# Patient Record
Sex: MALE | Race: WHITE | NOT HISPANIC OR LATINO | Employment: FULL TIME | URBAN - METROPOLITAN AREA
[De-identification: names, ages, dates, MRNs, and addresses within clinical notes are randomized per-mention and may not be internally consistent; named-entity substitution may affect disease eponyms.]

---

## 2017-03-30 ENCOUNTER — HOSPITAL ENCOUNTER (EMERGENCY)
Facility: CLINIC | Age: 32
Discharge: HOME | End: 2017-03-30
Attending: EMERGENCY MEDICINE | Admitting: EMERGENCY MEDICINE

## 2017-03-30 VITALS
TEMPERATURE: 97.8 F | RESPIRATION RATE: 16 BRPM | DIASTOLIC BLOOD PRESSURE: 82 MMHG | OXYGEN SATURATION: 100 % | SYSTOLIC BLOOD PRESSURE: 142 MMHG

## 2017-03-30 DIAGNOSIS — K02.9 INFECTED DENTAL CARIES: ICD-10-CM

## 2017-03-30 DIAGNOSIS — K05.10 GINGIVITIS: ICD-10-CM

## 2017-03-30 DIAGNOSIS — K04.7 INFECTED DENTAL CARIES: ICD-10-CM

## 2017-03-30 PROCEDURE — 99213 OFFICE O/P EST LOW 20 MIN: CPT | Performed by: EMERGENCY MEDICINE

## 2017-03-30 PROCEDURE — 99213 OFFICE O/P EST LOW 20 MIN: CPT

## 2017-03-30 RX ORDER — CLINDAMYCIN HCL 300 MG
300 CAPSULE ORAL 4 TIMES DAILY
Qty: 40 CAPSULE | Refills: 0 | Status: SHIPPED | OUTPATIENT
Start: 2017-03-30 | End: 2017-04-09

## 2017-03-30 ASSESSMENT — ENCOUNTER SYMPTOMS
CHILLS: 0
NAUSEA: 0
TROUBLE SWALLOWING: 0
SHORTNESS OF BREATH: 0
APPETITE CHANGE: 1
CHEST TIGHTNESS: 0
SORE THROAT: 0
ABDOMINAL PAIN: 0
VOMITING: 0
FEVER: 0
HEADACHES: 0

## 2017-03-30 NOTE — ED AVS SNAPSHOT
[facility] Emergency Department    [address] [address]    Phone:  [phone]    Fax:  [phone]                                       Mario Posada   MRN: [id number]    Department:  [facility] Emergency Department   Date of Visit:  3/30/2017           After Visit Summary Signature Page     I have received my discharge instructions, and my questions have been answered. I have discussed any challenges I see with this plan with the nurse or doctor.    ..........................................................................................................................................  Patient/Patient Representative Signature      ..........................................................................................................................................  Patient Representative Print Name and Relationship to Patient    ..................................................               ................................................  Date                                            Time    ..........................................................................................................................................  Reviewed by Signature/Title    ...................................................              ..............................................  Date                                                            Time

## 2017-03-30 NOTE — ED NOTES
Pt having swelling on the side of his mouth for the past 10 days.  Pt states he has been having green stool with mucous in it.  States he has had this before when he has had a dental infection.  Denies pain.

## 2017-03-30 NOTE — DISCHARGE INSTRUCTIONS
Stages of Periodontal Disease  Periodontal disease is an infection of the gums and tissues supporting the teeth. If not treated, it often gets worse. Bone damage and tooth loss can occur. Regular self-care and dental visits can help prevent or control periodontal disease.    Gingivitis  This is the mildest form of periodontal disease. The gum becomes irritated and swollen (inflamed). The space between the gum and tooth gets deeper, forming a pocket. Gums may become red and may bleed. Or, there may be no symptoms. Left untreated, it can progress to periodontitis.       Periodontitis  Infection and inflammation spread to the bone supporting the teeth. Gums may recede (shrink back) from the teeth. Pockets between the teeth can get deeper and harder to clean. Redness, swelling, and bleeding may develop or get worse. Infection begins to destroy the bone. As bone is destroyed, teeth may start to feel loose.       Advanced periodontitis  As periodontitis advances, pockets deepen even more and can fill with pus. Around the roots of the teeth, the gums may start to swell. Bone loss continues. The teeth may feel sensitive to heat or cold and may hurt when brushed. Teeth loosen more. In some cases, teeth may need to be removed to keep the disease from spreading.    1868-8463 [employer]. [address] [place] [address], [place] [address]. All rights reserved. This information is not intended as a substitute for professional medical care. Always follow your healthcare professional's instructions.          Understanding Gingivitis  Gingivitis is a type of gum disease. It is an inflammation of the gums that causes redness and swelling. It s most often caused by infection from bacteria on the teeth. A severe infection can cause small painful sores on the gums, bad breath, and bleeding gums. If untreated, gingivitis can lead to periodontal disease. Over time, this can cause tooth loss.  What causes gingivitis?  Gingivitis is  most often caused when plaque builds up on your teeth. Plaque is a sticky film made of bacteria and other substances that coats your teeth. Brushing and flossing helps remove plaque. If you don t brush and floss regularly, plaque can build up and lead to gingivitis.  You are more likely to have gingivitis if you:    Don t brush or floss regularly    Smoke or chew tobacco    Are pregnant    Have diabetes    Use medicines such as birth control pills, steroids, drugs used to treat epilepsy, or cancer drugs    Have family members who tend to get gingivitis  Signs and symptoms  You may have gingivitis if your gums have areas that:    Are swollen    Are bright red or dusky red    Bleed easily    Are sore  Treating gingivitis    See your dentist. He or she may clean your teeth and remove buildup on your teeth of plaque and tartar. Tartar is a mixture of plaque and minerals that forms into a hard substance.    Use an antiseptic mouth rinse if your dentist tells you to.    Take antibiotics and other medicines exactly as directed. Don t stop taking them when your symptoms go away.    Make sure you brush at least twice a day, and use dental floss at least once a day. This will help remove plaque from your teeth.    Eat a soft diet, if needed, to ease discomfort. Avoid food and beverages that may cause more discomfort in your gums. These include citrus juices such as orange juice and lemonade, and salty or spicy foods.    Use acetaminophen or ibuprofen for pain or fever. If you have liver or kidney disease or ever had a stomach ulcer or gastrointestinaI bleeding, talk with your healthcare provider before using these medicines.  When to call the healthcare provider  Call your healthcare provider if you have:    Pain that doesn t go away or gets worse    Gums that have pulled away from your teeth    A bad taste in your mouth that won t go away    Teeth that become loose    Inability to eat or drink because of mouth pain         6574-1671 [employer]. [address] [place] [address], [place] [address]. All rights reserved. This information is not intended as a substitute for professional medical care. Always follow your healthcare professional's instructions.      Many of these clinics offer a sliding fee option for patients that qualify, and see patients on a walk-in or same day basis. Please call each clinic directly. As services, hours, fees and policies vary greatly.          [facility], [facility]  [phone]  Sees no insurance  [facility], [place]  [phone]  Preventive services only  [facility] (mult loc) [phone]  [facility]    ([facility]), [facility]  [phone]  [facility]       [phone]  Preventive services only  [facility]  [phone]  Accepts MA & sees no ins  [facility],      Accepts MA & sees no ins   [place]   [phone]; [phone]  [facility]   Accepts MA & sees no ins       [phone]  [facility], [facility]  [phone]   Accepts MA emergencies  [facility], [place] [phone]  [facility],     Accepts MA   [place]   [phone]    [facility] [phone]  Accepts MA & sees no ins   [facility]   Dental Clinic    [phone]  [facility]  [phone]   [facility] [phone]  [facility]  Preventive services only   [place]   [phone]  [facility] [phone]  [facility], [place]  [phone]  Same day [facility] [phone]  Same day [facility],      Same day [facility]   [phone]    [facility], [facility] [phone]  Free clinic, walk-in only  [facility] (Located within [facility]  locations) [phone]      [facility] , [facility] [phone]    [facility] [phone]  Free clinic, walk-in only  [facility]  [phone]  [facility] [phone] (adults)       [phone] (children)  [facility] [phone]    Also, referral service for low cost dental and healthcare: [phone]  And [phone]

## 2017-03-30 NOTE — ED AVS SNAPSHOT
[facility] Emergency Department    [address] [address]    Phone:  [phone]    Fax:  [phone]                                       Mario Posada   MRN: [id number]    Department:  [facility] Emergency Department   Date of Visit:  3/30/2017           Patient Information     Date Of Birth          1985        Your diagnoses for this visit were:     Infected dental caries     Gingivitis        You were seen by Nigel Baer MD.      Follow-up Information     Follow up with Primary Jaxon Howard MD.    Why:  As needed        Follow up with [facility] Emergency Department.    Specialty:  EMERGENCY MEDICINE    Why:  If symptoms worsen    Contact information:    [address] [place] [id number] [phone]    Additional information:    The medical center is located at   [address]. (between [address] and   [address] in [place], [place] miles north   of [place]).        Discharge Instructions         Stages of Periodontal Disease  Periodontal disease is an infection of the gums and tissues supporting the teeth. If not treated, it often gets worse. Bone damage and tooth loss can occur. Regular self-care and dental visits can help prevent or control periodontal disease.    Gingivitis  This is the mildest form of periodontal disease. The gum becomes irritated and swollen (inflamed). The space between the gum and tooth gets deeper, forming a pocket. Gums may become red and may bleed. Or, there may be no symptoms. Left untreated, it can progress to periodontitis.       Periodontitis  Infection and inflammation spread to the bone supporting the teeth. Gums may recede (shrink back) from the teeth. Pockets between the teeth can get deeper and harder to clean. Redness, swelling, and bleeding may develop or get worse. Infection begins to destroy the bone. As bone is destroyed, teeth may start to feel loose.       Advanced periodontitis  As periodontitis  advances, pockets deepen even more and can fill with pus. Around the roots of the teeth, the gums may start to swell. Bone loss continues. The teeth may feel sensitive to heat or cold and may hurt when brushed. Teeth loosen more. In some cases, teeth may need to be removed to keep the disease from spreading.    9346-9980 The "[employer] [address] [place] [address]. All rights reserved. This information is not intended as a substitute for professional medical care. Always follow your healthcare professional's instructions.          Understanding Gingivitis  Gingivitis is a type of gum disease. It is an inflammation of the gums that causes redness and swelling. It s most often caused by infection from bacteria on the teeth. A severe infection can cause small painful sores on the gums, bad breath, and bleeding gums. If untreated, gingivitis can lead to periodontal disease. Over time, this can cause tooth loss.  What causes gingivitis?  Gingivitis is most often caused when plaque builds up on your teeth. Plaque is a sticky film made of bacteria and other substances that coats your teeth. Brushing and flossing helps remove plaque. If you don t brush and floss regularly, plaque can build up and lead to gingivitis.  You are more likely to have gingivitis if you:    Don t brush or floss regularly    Smoke or chew tobacco    Are pregnant    Have diabetes    Use medicines such as birth control pills, steroids, drugs used to treat epilepsy, or cancer drugs    Have family members who tend to get gingivitis  Signs and symptoms  You may have gingivitis if your gums have areas that:    Are swollen    Are bright red or dusky red    Bleed easily    Are sore  Treating gingivitis    See your dentist. He or she may clean your teeth and remove buildup on your teeth of plaque and tartar. Tartar is a mixture of plaque and minerals that forms into a hard substance.    Use an antiseptic mouth rinse if your dentist  tells you to.    Take antibiotics and other medicines exactly as directed. Don t stop taking them when your symptoms go away.    Make sure you brush at least twice a day, and use dental floss at least once a day. This will help remove plaque from your teeth.    Eat a soft diet, if needed, to ease discomfort. Avoid food and beverages that may cause more discomfort in your gums. These include citrus juices such as orange juice and lemonade, and salty or spicy foods.    Use acetaminophen or ibuprofen for pain or fever. If you have liver or kidney disease or ever had a stomach ulcer or gastrointestinaI bleeding, talk with your healthcare provider before using these medicines.  When to call the healthcare provider  Call your healthcare provider if you have:    Pain that doesn t go away or gets worse    Gums that have pulled away from your teeth    A bad taste in your mouth that won t go away    Teeth that become loose    Inability to eat or drink because of mouth pain        7358-8191 [employer]. [address] [place] [address]. All rights reserved. This information is not intended as a substitute for professional medical care. Always follow your healthcare professional's instructions.      Many of these clinics offer a sliding fee option for patients that qualify, and see patients on a walk-in or same day basis. Please call each clinic directly. As services, hours, fees and policies vary greatly.          [facility], [facility]  [phone]  Sees no insurance  [facility], [place]  [phone]  Preventive services only  [facility] ([facility] loc) [phone]  [facility]    ([facility]), [facility]  [phone]  [facility], [place]       [phone]  Preventive services only  [facility]  [phone]  Accepts MA & sees no ins  [facility],      Accepts MA & sees no ins   [place]   [phone];  [phone]  [facility], [facility]   Accepts MA & sees no ins       [phone]  [facility], [facility]  [phone]   Accepts MA emergencies  [facility], [place] [phone]  [facility],     Accepts MA   [place]   [phone]    [facility] [phone]  Accepts MA & sees no ins   [facility]   Dental Clinic    [phone]  [facility], [facility]  [phone]   Community Clinic [phone]  [facility]  Preventive services only   [place]   [phone]  [facility] [phone]  [facility]  [phone]  Same day [facility] [phone]  Same day [facility],      Same day [facility]   [phone]    [facility], [facility] [phone]  Free clinic, walk-in only  [facility] (multiple locations) [phone]      [facility] [phone]    [facility] [phone]  Free clinic, walk-in only  [facility]  [phone]  [facility] [phone] (adults)       [phone] (children)  [place] [facility] [phone]    Also, referral service for low cost dental and healthcare: [phone]  And [phone]      24 Hour Appointment Hotline       To make an appointment at any [facility] clinic, call [phone] ([phone]). If you don't have a family doctor or clinic, we will help you find one. [facility] clinics are conveniently located to serve the needs of you and your family.             Review of your medicines      START taking        Dose / Directions Last dose taken    clindamycin 300 MG capsule   Commonly known as:  CLEOCIN   Dose:  300 mg   Quantity:  40 capsule        Take 1 capsule (300 mg) by mouth 4 times daily for 10 days   Refills:  0  "               Prescriptions were sent or printed at these locations (1 Prescription)                   Other Prescriptions                Printed at Department/Unit printer (1 of 1)         clindamycin (CLEOCIN) 300 MG capsule                Orders Needing Specimen Collection     None      Pending Results     No orders found from 3/28/2017 to 3/31/2017.            Pending Culture Results     No orders found from 3/28/2017 to 3/31/2017.             Test Results from your hospital stay            Thank you for choosing [facility]       Thank you for choosing [facility] for your care. Our goal is always to provide you with excellent care. Hearing back from our patients is one way we can continue to improve our services. Please take a few minutes to complete the written survey that you may receive in the mail after you visit with us. Thank you!        [employer] Information     [employer] lets you send messages to your doctor, view your test results, renew your prescriptions, schedule appointments and more. To sign up, go to [email or web address] . Click on \"Log in\" on the left side of the screen, which will take you to the Welcome page. Then click on \"Sign up Now\" on the right side of the page.     You will be asked to enter the access code listed below, as well as some personal information. Please follow the directions to create your username and password.     Your access code is: [email or web address]  Expires: 2017  4:04 PM     Your access code will  in 90 days. If you need help or a new code, please call your [facility] clinic or [phone].        Care EveryWhere ID     This is your Care EveryWhere ID. This could be used by other organizations to access your [facility] medical records  [id number]        After Visit Summary       This is your record. Keep this with you and show to your community pharmacist(s) and doctor(s) at your next visit.                  "

## 2017-03-30 NOTE — ED PROVIDER NOTES
History     Chief Complaint   Patient presents with     Dental Problem     Pt having swelling on the side of his mouth for the past 10 days     HPI  Mario Posada is a 31 year old male who presents to emergency department complaining of gum swelling of his upper and lower oral cavity.  Patient states he has very poor dentition with numerous cavities.  He is not taking care of his teeth at all.  He is never seen a dentist.  Patient feels his gums become more swollen recently and he thinks he has an infection.  Last week his face swelled up a bit but this is now stopped swelling.  He has not had any difficulty breathing or swallowing.  Denies any fevers or chills does not have a headache.  Denies any chest pain or shortness of breath.  Patient states he has just aching pain especially with eating but is more concerned about the swelling.  History reviewed. No pertinent past medical history.     No current facility-administered medications on file prior to encounter.   No current outpatient prescriptions on file prior to encounter.   Social History     Social History Narrative     I have reviewed the Medications, Allergies, Past Medical and Surgical History, and Social History in the Epic system.    Review of Systems   Constitutional: Positive for appetite change. Negative for chills and fever.   HENT: Positive for dental problem. Negative for congestion, sore throat and trouble swallowing.    Respiratory: Negative for chest tightness and shortness of breath.    Cardiovascular: Negative for chest pain.   Gastrointestinal: Negative for abdominal pain, nausea and vomiting.   Skin: Negative for rash.   Neurological: Negative for headaches.       Physical Exam   BP: 142/82  Heart Rate: 117  Temp: 97.8  F (36.6  C)  Resp: 16  SpO2: 100 %  Physical Exam   Constitutional: He appears well-developed and well-nourished. No distress.   HENT:   Head: Normocephalic.   Generalized and severe carious teeth with significantly poor  dentition swelling noted in both upper and lower gum line no obvious abscesses palpable.  There is no oral mucosal swelling.  No abscesses palpable.  Posterior pharynx without erythema or edema no exudate.   Eyes: Conjunctivae are normal.   Neck: Normal range of motion. Neck supple.   Pulmonary/Chest: Effort normal.   Neurological: He is alert. He exhibits normal muscle tone.   Skin: Skin is warm and dry. No rash noted.   Psychiatric: He has a normal mood and affect.   Nursing note and vitals reviewed.      ED Course     ED Course     Procedures             Critical Care time:  none                   Assessments & Plan (with Medical Decision Making) patient has significant gingival infection and I'm going to start him on clindamycin.  I'm going to give him the number to multiple dentists he could see.  Patient needs to take ibuprofen or Tylenol for pain and return if increased swelling any facial swelling fevers or other symptoms present he is in agreement with this plan.      I have reviewed the nursing notes.    I have reviewed the findings, diagnosis, plan and need for follow up with the patient.    New Prescriptions    CLINDAMYCIN (CLEOCIN) 300 MG CAPSULE    Take 1 capsule (300 mg) by mouth 4 times daily for 10 days       Final diagnoses:   Infected dental caries   Gingivitis       3/30/2017   [facility] EMERGENCY DEPARTMENT     Nigel Baer MD  03/30/17 5558

## 2017-04-04 ENCOUNTER — ANESTHESIA (OUTPATIENT)
Dept: EMERGENCY MEDICINE | Facility: CLINIC | Age: 32
End: 2017-04-04
Payer: MEDICAID

## 2017-04-04 ENCOUNTER — APPOINTMENT (OUTPATIENT)
Dept: GENERAL RADIOLOGY | Facility: CLINIC | Age: 32
End: 2017-04-04
Attending: PHYSICIAN ASSISTANT
Payer: MEDICAID

## 2017-04-04 ENCOUNTER — HOSPITAL ENCOUNTER (EMERGENCY)
Facility: CLINIC | Age: 32
Discharge: HOME | End: 2017-04-04
Attending: PHYSICIAN ASSISTANT | Admitting: PHYSICIAN ASSISTANT
Payer: MEDICAID

## 2017-04-04 ENCOUNTER — APPOINTMENT (OUTPATIENT)
Dept: GENERAL RADIOLOGY | Facility: CLINIC | Age: 32
End: 2017-04-04
Attending: FAMILY MEDICINE
Payer: MEDICAID

## 2017-04-04 ENCOUNTER — ANESTHESIA EVENT (OUTPATIENT)
Dept: EMERGENCY MEDICINE | Facility: CLINIC | Age: 32
End: 2017-04-04
Payer: MEDICAID

## 2017-04-04 VITALS
DIASTOLIC BLOOD PRESSURE: 70 MMHG | OXYGEN SATURATION: 97 % | WEIGHT: 126 LBS | RESPIRATION RATE: 12 BRPM | HEIGHT: 71 IN | SYSTOLIC BLOOD PRESSURE: 118 MMHG | HEART RATE: 81 BPM | BODY MASS INDEX: 17.64 KG/M2 | TEMPERATURE: 97.9 F

## 2017-04-04 DIAGNOSIS — J93.9 PNEUMOTHORAX ON LEFT: ICD-10-CM

## 2017-04-04 PROCEDURE — 40000797 XR CHEST 2 VW

## 2017-04-04 PROCEDURE — 32551 INSERTION OF CHEST TUBE: CPT

## 2017-04-04 PROCEDURE — 93005 ELECTROCARDIOGRAM TRACING: CPT

## 2017-04-04 PROCEDURE — 96374 THER/PROPH/DIAG INJ IV PUSH: CPT

## 2017-04-04 PROCEDURE — 32551 INSERTION OF CHEST TUBE: CPT | Performed by: FAMILY MEDICINE

## 2017-04-04 PROCEDURE — 25000125 ZZHC RX 250: Performed by: NURSE ANESTHETIST, CERTIFIED REGISTERED

## 2017-04-04 PROCEDURE — 25000132 ZZH RX MED GY IP 250 OP 250 PS 637: Performed by: FAMILY MEDICINE

## 2017-04-04 PROCEDURE — 71020 XR CHEST 2 VW: CPT

## 2017-04-04 PROCEDURE — 40000940 XR CHEST PORT 1 VW

## 2017-04-04 PROCEDURE — 93010 ELECTROCARDIOGRAM REPORT: CPT | Mod: 59 | Performed by: FAMILY MEDICINE

## 2017-04-04 PROCEDURE — 40000940 XR CHEST 1 VW

## 2017-04-04 PROCEDURE — 40000671 ZZH STATISTIC ANESTHESIA CASE

## 2017-04-04 PROCEDURE — 99284 EMERGENCY DEPT VISIT MOD MDM: CPT | Mod: 25 | Performed by: FAMILY MEDICINE

## 2017-04-04 PROCEDURE — 99285 EMERGENCY DEPT VISIT HI MDM: CPT | Mod: 25

## 2017-04-04 PROCEDURE — 25800025 ZZH RX 258: Performed by: NURSE ANESTHETIST, CERTIFIED REGISTERED

## 2017-04-04 PROCEDURE — 25000128 H RX IP 250 OP 636: Performed by: FAMILY MEDICINE

## 2017-04-04 PROCEDURE — 96375 TX/PRO/DX INJ NEW DRUG ADDON: CPT

## 2017-04-04 RX ORDER — OXYCODONE AND ACETAMINOPHEN 5; 325 MG/1; MG/1
1-2 TABLET ORAL EVERY 6 HOURS PRN
Qty: 10 TABLET | Refills: 0 | Status: SHIPPED | OUTPATIENT
Start: 2017-04-04 | End: 2019-03-01

## 2017-04-04 RX ORDER — HYDROMORPHONE HYDROCHLORIDE 1 MG/ML
0.5 INJECTION, SOLUTION INTRAMUSCULAR; INTRAVENOUS; SUBCUTANEOUS ONCE
Status: COMPLETED | OUTPATIENT
Start: 2017-04-04 | End: 2017-04-04

## 2017-04-04 RX ORDER — SODIUM CHLORIDE, SODIUM LACTATE, POTASSIUM CHLORIDE, CALCIUM CHLORIDE 600; 310; 30; 20 MG/100ML; MG/100ML; MG/100ML; MG/100ML
INJECTION, SOLUTION INTRAVENOUS CONTINUOUS PRN
Status: DISCONTINUED | OUTPATIENT
Start: 2017-04-04 | End: 2017-04-04

## 2017-04-04 RX ORDER — PROPOFOL 10 MG/ML
INJECTION, EMULSION INTRAVENOUS PRN
Status: DISCONTINUED | OUTPATIENT
Start: 2017-04-04 | End: 2017-04-04

## 2017-04-04 RX ORDER — OXYCODONE AND ACETAMINOPHEN 5; 325 MG/1; MG/1
2 TABLET ORAL ONCE
Status: COMPLETED | OUTPATIENT
Start: 2017-04-04 | End: 2017-04-04

## 2017-04-04 RX ORDER — KETOROLAC TROMETHAMINE 30 MG/ML
30 INJECTION, SOLUTION INTRAMUSCULAR; INTRAVENOUS ONCE
Status: COMPLETED | OUTPATIENT
Start: 2017-04-04 | End: 2017-04-04

## 2017-04-04 RX ADMIN — PROPOFOL 50 MG: 10 INJECTION, EMULSION INTRAVENOUS at 14:09

## 2017-04-04 RX ADMIN — SODIUM CHLORIDE, POTASSIUM CHLORIDE, SODIUM LACTATE AND CALCIUM CHLORIDE: 600; 310; 30; 20 INJECTION, SOLUTION INTRAVENOUS at 14:01

## 2017-04-04 RX ADMIN — PROPOFOL 50 MG: 10 INJECTION, EMULSION INTRAVENOUS at 14:02

## 2017-04-04 RX ADMIN — OXYCODONE HYDROCHLORIDE AND ACETAMINOPHEN 2 TABLET: 5; 325 TABLET ORAL at 16:13

## 2017-04-04 RX ADMIN — PROPOFOL 50 MG: 10 INJECTION, EMULSION INTRAVENOUS at 14:04

## 2017-04-04 RX ADMIN — KETOROLAC TROMETHAMINE 30 MG: 30 INJECTION, SOLUTION INTRAMUSCULAR at 14:53

## 2017-04-04 RX ADMIN — HYDROMORPHONE HYDROCHLORIDE 0.5 MG: 1 INJECTION, SOLUTION INTRAMUSCULAR; INTRAVENOUS; SUBCUTANEOUS at 14:54

## 2017-04-04 RX ADMIN — PROPOFOL 50 MG: 10 INJECTION, EMULSION INTRAVENOUS at 14:12

## 2017-04-04 ASSESSMENT — ENCOUNTER SYMPTOMS
DYSURIA: 0
DIAPHORESIS: 0
NAUSEA: 0
SHORTNESS OF BREATH: 1
ABDOMINAL PAIN: 0
FREQUENCY: 0
CONSTIPATION: 0
WHEEZING: 1
BLOOD IN STOOL: 0
SORE THROAT: 0
HEADACHES: 0
FEVER: 0
PALPITATIONS: 0
DIARRHEA: 0
VOMITING: 0
COUGH: 1
SINUS PRESSURE: 0
CHILLS: 0

## 2017-04-04 ASSESSMENT — LIFESTYLE VARIABLES: TOBACCO_USE: 1

## 2017-04-04 NOTE — ED PROVIDER NOTES
Patient is a 31-year-old male present with spontaneous pneumothorax.  He had a chest tube placed by Dr. Finnegan.  I was asked to review a chest x-ray that was repeated after one hour to make sure the pneumothorax had resolved and patient was tolerating the catheter without difficulty.    Results for orders placed or performed during the hospital encounter of 04/04/17   Chest XR,  PA & LAT    Narrative    XR CHEST 2 VW 4/4/2017 1:02 PM    HISTORY: Left-sided tightness.    COMPARISON: 3/12/2015.      Impression    IMPRESSION: 2 views of the chest show a a left-sided pneumothorax  which is larger compared to the prior study. It measures 3.7 cm of  lucency in the apical area and 1.2 cm of lucency laterally in the left  mid to lower lung zone. There is no associated tension.     YASMIN ORDONEZ MD   Chest  XR, 1 view portable    Narrative    CHEST ONE VIEW PORTABLE  4/4/2017 2:28 PM     HISTORY: Chest tube placement.    COMPARISON: Chest x-ray from earlier the same day.      Impression    IMPRESSION: A left chest tube has been placed. The left lung has  partially reexpanded, but a small pneumothorax remains.    JAC RICHARDS MD   XR Chest 1 View    Narrative    XR CHEST 1 VW 4/4/2017 3:14 PM    HISTORY: Chest tube placement.    COMPARISON: Frontal view the chest done at 1421 hours today.      Impression    IMPRESSION: A single lateral view the chest shows the small bore chest  tube tip to project anterior at the junction of the middle one third  and anterior one third of the chest. More proximally there appears to  be a kink in the tube.    YASMIN ORDONEZ MD   Chest XR,  PA & LAT    Narrative    CHEST TWO VIEWS  4/4/2017 5:44 PM     HISTORY: Recheck chest tube placement and re-expansion.    COMPARISON: 4/4/2017 at 1421 hours      Impression    IMPRESSION: Chest catheter has been pulled back somewhat and the lung  is better expanded laterally, although there is still pneumothorax at  the left apex, about 10% and up to 2.0 cm  from the left thoracic apex.  Minimal left pleural effusion.     The catheter was pulled back somewhat.  It was still in the chest.  The lung was expanded laterally with a small 10% apical pneumothorax.     At this point rather than removing and replacing the chest tube we will leave it in place as it seems to be functioning.  He has an appointment Friday with surgery to reassess and remove the tube reinflated.  He is given a handout on pneumothorax.  Oral pain pills.  The Heimlich valve was in place and seems to be functioning properly.  He is to return to the emergency room were discussed.        Left pneumothorax improved: Patient will home with the chest tube and Heimlich valve.  He'll watch for secondary infection.  He is given a handout pneumothorax.  He is given oral pain pills.  He has appointment Friday with surgery.  Reasons to return to the emergency room were discussed.     Pj Fleming MD  04/04/17 8388

## 2017-04-04 NOTE — ED AVS SNAPSHOT
[facility]    [address] [facility] [address]    Phone:  [phone]    Fax:  [phone]                                       Mario Posada   MRN: [id number]    Department:  [facility]   Date of Visit:  4/4/2017           After Visit Summary Signature Page     I have received my discharge instructions, and my questions have been answered. I have discussed any challenges I see with this plan with the nurse or doctor.    ..........................................................................................................................................  Patient/Patient Representative Signature      ..........................................................................................................................................  Patient Representative Print Name and Relationship to Patient    ..................................................               ................................................  Date                                            Time    ..........................................................................................................................................  Reviewed by Signature/Title    ...................................................              ..............................................  Date                                                            Time

## 2017-04-04 NOTE — ANESTHESIA POSTPROCEDURE EVALUATION
Patient: Mario Posada    * No procedures listed *    Diagnosis:* No pre-op diagnosis entered *  Diagnosis Additional Information: No value filed.    Anesthesia Type:  MAC    Note:  Anesthesia Post Evaluation    Patient location during evaluation: Bedside  Patient participation: Able to fully participate in evaluation  Level of consciousness: awake and alert  Pain management: adequate  Airway patency: patent  Cardiovascular status: acceptable  Respiratory status: acceptable  Hydration status: acceptable  PONV: none     Anesthetic complications: None          Last vitals:  Vitals:    04/04/17 1222 04/04/17 1345   BP: 124/79 (!) 152/97   Pulse: 81    Resp: 18 13   Temp: 36.6  C (97.9  F)    SpO2: 100% 100%         Electronically Signed By: Federico Hooper CRNA, APRN CRNA  April 4, 2017  2:42 PM

## 2017-04-04 NOTE — ANESTHESIA CARE TRANSFER NOTE
Patient: Mario Posada    * No procedures listed *    Diagnosis: * No pre-op diagnosis entered *  Diagnosis Additional Information: No value filed.    Anesthesia Type:   MAC     Note:  Airway :Nasal Cannula  Patient transferred to:Emergency Department        Vitals: (Last set prior to Anesthesia Care Transfer)    CRNA VITALS  4/4/2017 1356 - 4/4/2017 1442      4/4/2017             NIBP: 118/68    Pulse: 65    SpO2: 95 %    EKG: NSR                Electronically Signed By: Federico Hooper CRNA, APRN CRNA  April 4, 2017  2:42 PM

## 2017-04-04 NOTE — ED AVS SNAPSHOT
[facility] Emergency Department    [address] [address]    Phone:  [phone]    Fax:  [phone]                                       Mario Posada   MRN: [id number]    Department:  [facility] Emergency Department   Date of Visit:  4/4/2017           Patient Information     Date Of Birth          1985        Your diagnoses for this visit were:     Pneumothorax on left Take ibuprofen 600 mg every 6 hours scheduled.  Take percocet for breakthrough pain.  follow-up in surgery clinic thursday. return for worsening.       You were seen by Krys Cantu PA-C and Fabio Finnegan MD.      Follow-up Information     Follow up with general surgery In 3 days.    Why:  Re-xray and chest tube out        Follow up with [facility] Emergency Department.    Specialty:  EMERGENCY MEDICINE    Why:  As needed, If symptoms worsen    Contact information:    [address] [place] [address] [id number] [phone]    Additional information:    The medical center is located at   [address] [place] [address]. (between [facility] and   [facility] in [place], four miles north   of [place]).        Discharge Instructions         ICD-10-CM    1. Pneumothorax on left J93.9     Take ibuprofen 600 mg every 6 hours scheduled.  Take percocet for breakthrough pain.  follow-up in surgery clinic thursday. return for worsening.           Pneumothorax (Collapsed Lung)    A pneumothorax occurs when air fills the pleural cavity (the space between your lung and chest wall). This can cause all or part of your lung to collapse. The main cause of a pneumothorax is an injury to the chest cavity that punctures the lungs. Damage may result from a stab or gunshot wound, car accident, fall, or certain surgical procedures. In some cases, a pneumothorax happens spontaneously, without an obvious cause.   You're more likely to have spontaneous pneumothorax if you smoke or have a chronic lung disease, such as emphysema.    When to Go to the Emergency Room (ER)  Serious pneumothorax can be fatal if not treated. Call 911 for a bad chest wound or any of the following symptoms:    Sudden, sharp chest pain that may spread to your shoulder or back    Shortness of breath; trouble breathing    A bluish color to the skin    Loss of consciousness or feeling faint with any of the above symptoms  What to Expect in the ER    You will be examined carefully.    Your lungs and heart will be listened to through a stethoscope.    You may have X-rays or a computed tomography (CT) scan. A CT scan combines X-rays and computer scans to provide detailed pictures of your lungs.    You will be given help with breathing if you need it.  Treatment    If the pneumothorax is small, you may stay in the ER for 5 to 6 hours to see if it gets any worse. If it does not get worse, you may be sent home without treatment and told to follow up with your regular doctor.    If the pneumothorax needs treatment, you will be admitted to the hospital. The air in your pleural cavity may be removed with a needle. Or, a hollow chest tube may be placed in your chest. This is attached to a suction device that removes the air. In that case, you will be admitted to the hospital for a few days.  After treatment, you will be told what to do to care for yourself and when to follow up with your doctor.    6579-6735 [employer]. [address] [place] [address]. All rights reserved. This information is not intended as a substitute for professional medical care. Always follow your healthcare professional's instructions.          Future Appointments        Provider Department Dept Phone Center    4/7/2017 8:30 AM Jose Chambers MD [facility] [phone] [facility]      24 Hour Appointment Hotline       To make an appointment at any [facility], call [phone] ([phone]). If you don't have a family doctor or clinic, we will help you find one.  [facility] are conveniently located to serve the needs of you and your family.             Review of your medicines      START taking        Dose / Directions Last dose taken    oxyCODONE-acetaminophen 5-325 MG per tablet   Commonly known as:  PERCOCET   Dose:  1-2 tablet   Quantity:  10 tablet        Take 1-2 tablets by mouth every 6 hours as needed for moderate to severe pain   Refills:  0          Our records show that you are taking the medicines listed below. If these are incorrect, please call your family doctor or clinic.        Dose / Directions Last dose taken    clindamycin 300 MG capsule   Commonly known as:  CLEOCIN   Dose:  300 mg   Quantity:  40 capsule        Take 1 capsule (300 mg) by mouth 4 times daily for 10 days   Refills:  0        IBUPROFEN PO   Dose:  800 mg        Take 800 mg by mouth every 8 hours as needed for moderate pain   Refills:  0                Prescriptions were sent or printed at these locations (1 Prescription)                   [facility] [address] [facility]   [address] [facility] [address]    Telephone:  [phone]   Fax:  [phone]   Hours:                  Printed at Department/Unit printer (1 of 1)         oxyCODONE-acetaminophen (PERCOCET) 5-325 MG per tablet                Procedures and tests performed during your visit     Procedure/Test Number of Times Performed    Chest  XR, 1 view portable 1    Chest XR,  PA & LAT 2    EKG 12 lead 1    XR Chest 1 View 1      Orders Needing Specimen Collection     None      Pending Results     Date and Time Order Name Status Description    4/4/2017 1546 Chest XR,  PA & LAT Preliminary             Pending Culture Results     No orders found from 4/2/2017 to 4/5/2017.            Test Results From Your Hospital Stay        4/4/2017  2:37 PM      Narrative     XR CHEST 2 VW 4/4/2017 1:02 PM    HISTORY: Left-sided tightness.    COMPARISON: 3/12/2015.        Impression     IMPRESSION: 2 views of  the chest show a a left-sided pneumothorax  which is larger compared to the prior study. It measures 3.7 cm of  lucency in the apical area and 1.2 cm of lucency laterally in the left  mid to lower lung zone. There is no associated tension.     YASMIN ORDONEZ MD         4/4/2017  2:49 PM      Narrative     CHEST ONE VIEW PORTABLE  4/4/2017 2:28 PM     HISTORY: Chest tube placement.    COMPARISON: Chest x-ray from earlier the same day.        Impression     IMPRESSION: A left chest tube has been placed. The left lung has  partially reexpanded, but a small pneumothorax remains.    JAC RICHARDS MD               4/4/2017  3:19 PM      Narrative     XR CHEST 1 VW 4/4/2017 3:14 PM    HISTORY: Chest tube placement.    COMPARISON: Frontal view the chest done at 1421 hours today.        Impression     IMPRESSION: A single lateral view the chest shows the small bore chest  tube tip to project anterior at the junction of the middle one third  and anterior one third of the chest. More proximally there appears to  be a kink in the tube.    YASMIN ORDONEZ MD         4/4/2017  5:52 PM      Narrative     CHEST TWO VIEWS  4/4/2017 5:44 PM     HISTORY: Recheck chest tube placement and re-expansion.    COMPARISON: 4/4/2017 at 1421 hours        Impression     IMPRESSION: Chest catheter has been pulled back somewhat and the lung  is better expanded laterally, although there is still pneumothorax at  the left apex, about 10% and up to 2.0 cm from the left thoracic apex.  Minimal left pleural effusion.                Thank you for choosing [facility]       Thank you for choosing [facility] for your care. Our goal is always to provide you with excellent care. Hearing back from our patients is one way we can continue to improve our services. Please take a few minutes to complete the written survey that you may receive in the mail after you visit with us. Thank you!        [employer] Information     [employer] lets you send messages to your doctor, view  "your test results, renew your prescriptions, schedule appointments and more. To sign up, go to [email or web address] . Click on \"Log in\" on the left side of the screen, which will take you to the Welcome page. Then click on \"Sign up Now\" on the right side of the page.     You will be asked to enter the access code listed below, as well as some personal information. Please follow the directions to create your username and password.     Your access code is: [email or web address]  Expires: 2017  4:04 PM     Your access code will  in 90 days. If you need help or a new code, please call your [facility] clinic or [phone].        Care EveryWhere ID     This is your Care EveryWhere ID. This could be used by other organizations to access your [facility] medical records  [id number]        After Visit Summary       This is your record. Keep this with you and show to your community pharmacist(s) and doctor(s) at your next visit.                  "

## 2017-04-04 NOTE — ED PROVIDER NOTES
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    Mario Posada is a 31 year old male who presents to the clinic today with a chief complaint of left sided chest tightness, cough/wheezing, and left shoulder pain for the past 4-5 days. Patient states that he has a history of spontaneous pneumothorax in the past. Patient denies substance abuse or alcohol use. Patient states he quit smoking in 2012 after first pneumothorax, but he still lives with people who smoke. Patient currently on Clindamycin for tooth infection. The patient's symptoms are exacerbated by no particular triggers  Patient has been using nothing  to improve symptoms.  Patient states he has a slight runny nose.     Review of Systems     CONSTITUTIONAL:NEGATIVE for fever, chills, change in weight  INTEGUMENTARY/SKIN: NEGATIVE for worrisome rashes, moles or lesions  ENT/MOUTH: NEGATIVE for ear, mouth and throat problems  RESP:POSITIVE for cough-non productive, dyspnea on exertion and wheezing  CV: POSITIVE for chest tightness on the left side; patient denies palpitations, heart racing, or skipped beats.   GI: NEGATIVE for nausea, abdominal pain, heartburn, or change in bowel habits  MUSCULOSKELETAL: NEGATIVE for significant arthralgias or myalgia    Physical Exam   BP: 124/79  Pulse: 81  Temp: 97.9  F (36.6  C)  Resp: 18  Height: 180.3 cm (5' 11\")  Weight: 57.2 kg (126 lb)  SpO2: 100 %  Physical Exam     /79  Pulse 81  Temp 97.9  F (36.6  C) (Oral)  Resp 18  Ht 1.803 m (5' 11\")  Wt 57.2 kg (126 lb)  SpO2 100%  BMI 17.57 kg/m2  GENERAL APPEARANCE: healthy, alert and no distress  EYES: EOMI,  PERRL, conjunctiva clear  HENT: ear " canals and TM's normal.  Nose and mouth without ulcers, erythema or lesions  NECK: supple, nontender, no lymphadenopathy  RESP: lungs clear to auscultation - no rales, rhonchi or wheezes; no tenderness with palpation.   CV: regular rates and rhythm, normal S1 S2, no murmur noted  ABDOMEN:  soft, nontender, no HSM or masses and bowel sounds normal  NEURO: Normal strength and tone, sensory exam grossly normal,  normal speech and mentation  SKIN: no suspicious lesions or rashes    No results found for this or any previous visit (from the past 24 hour(s)).    X-RAY: positive pneumothorax noted to left lung on x-ray     ED Course     ED Course   report discussed with Dr. Fabio Finnegan and patient sent to Emergency Room for further treatment.   Procedures             EKG Interpretation:      Interpreted by Krys Cantu  Time reviewed: 12:45pm  Symptoms at time of EKG: chest tightness to left side with slight cough  Rhythm: normal sinus with poor R-wave progression   Rate: with rate variation  Axis: normal  Ectopy: none  Conduction: normal  ST Segments/ T Waves: No ST-T wave changes  Q Waves: none  Comparison to prior: Unchanged from 3/5/15    Clinical Impression: normal EKG          Critical Care time:                 Labs Ordered and Resulted from Time of ED Arrival Up to the Time of Departure from the ED - No data to display    Assessments & Plan (with Medical Decision Making)     I have reviewed the nursing notes.    I have reviewed the findings, diagnosis, plan and need for follow up with the patient.    New Prescriptions    No medications on file       Final diagnoses:   None       4/4/2017   [facility] EMERGENCY DEPARTMENT     Krys Cantu PA-C  04/04/17 7007

## 2017-04-04 NOTE — DISCHARGE INSTRUCTIONS
ICD-10-CM    1. Pneumothorax on left J93.9     Take ibuprofen 600 mg every 6 hours scheduled.  Take percocet for breakthrough pain.  follow-up in surgery clinic thursday. return for worsening.           Pneumothorax (Collapsed Lung)    A pneumothorax occurs when air fills the pleural cavity (the space between your lung and chest wall). This can cause all or part of your lung to collapse. The main cause of a pneumothorax is an injury to the chest cavity that punctures the lungs. Damage may result from a stab or gunshot wound, car accident, fall, or certain surgical procedures. In some cases, a pneumothorax happens spontaneously, without an obvious cause.   You're more likely to have spontaneous pneumothorax if you smoke or have a chronic lung disease, such as emphysema.   When to Go to the Emergency Room (ER)  Serious pneumothorax can be fatal if not treated. Call 911 for a bad chest wound or any of the following symptoms:    Sudden, sharp chest pain that may spread to your shoulder or back    Shortness of breath; trouble breathing    A bluish color to the skin    Loss of consciousness or feeling faint with any of the above symptoms  What to Expect in the ER    You will be examined carefully.    Your lungs and heart will be listened to through a stethoscope.    You may have X-rays or a computed tomography (CT) scan. A CT scan combines X-rays and computer scans to provide detailed pictures of your lungs.    You will be given help with breathing if you need it.  Treatment    If the pneumothorax is small, you may stay in the ER for 5 to 6 hours to see if it gets any worse. If it does not get worse, you may be sent home without treatment and told to follow up with your regular doctor.    If the pneumothorax needs treatment, you will be admitted to the hospital. The air in your pleural cavity may be removed with a needle. Or, a hollow chest tube may be placed in your chest. This is attached to a suction device that  removes the air. In that case, you will be admitted to the hospital for a few days.  After treatment, you will be told what to do to care for yourself and when to follow up with your doctor.    [id number] [employer]. [address] [place] [address], [place] [address]. All rights reserved. This information is not intended as a substitute for professional medical care. Always follow your healthcare professional's instructions.

## 2017-04-04 NOTE — ANESTHESIA PREPROCEDURE EVALUATION
Anesthesia Evaluation     .             ROS/MED HX    ENT/Pulmonary:     (+)tobacco use, Past use , . Other pulmonary disease left pneumothorax.    Neurologic:       Cardiovascular:         METS/Exercise Tolerance:     Hematologic:         Musculoskeletal:         GI/Hepatic:     (+) Other GI/Hepatic ETOH induced pancreatitis      Renal/Genitourinary:         Endo:         Psychiatric:         Infectious Disease:         Malignancy:         Other:                     Physical Exam  Normal systems: cardiovascular and pulmonary    Airway   Mallampati: II  TM distance: >3 FB  Neck ROM: full    Dental   (+) missing and loose    Cardiovascular       Pulmonary                     Anesthesia Plan      History & Physical Review  History and physical reviewed and following examination; no interval change.    ASA Status:  2 emergent.    NPO Status:  > 8 hours    Plan for MAC Reason for MAC:  Deep or markedly invasive procedure (G8)         Postoperative Care      Consents  Anesthetic plan, risks, benefits and alternatives discussed with:  Patient and Parent (Mother and/or Father)..                          .

## 2017-04-05 NOTE — ED PROVIDER NOTES
History     Chief Complaint   Patient presents with     URI     cough , hard to breath    no congestion   body aches  back pain worse    has recently had a tooth infection    Works as a cook at a [employer]  needs to be checked out      HPI  Mario Posada is a 31 year old male who was seen in urgent care today for left-sided chest tightness a cough and wheezing and left shoulder pain for the last 4 to 5 days. Prior spontaneous pneumothorax in the past. Quit tobacco in 2012 after his first pneumothorax. Concurrently being treated for tooth infection with clindamycin.  On his evaluation in the urgent care, a pneumothorax was identified by Krys Cantu.  she consulted with me we brought the patient to the emergency department for placement of a chest tube.    I independently evaluated the patient.  he was in no significant distress at the time without complaints of significant shortness of breath. No significant cough no fever. He was tolerating fluids well and it had a soda immediately prior to my evaluation.  Last solid food intake was last night.    SHx: cook at [employer]    Patient Active Problem List   Diagnosis     Pneumothorax on left     Acute pancreatitis     Alcohol-induced pancreatitis     Alcoholic pancreatitis     Current Outpatient Prescriptions   Medication Sig Dispense Refill     IBUPROFEN PO Take 800 mg by mouth every 8 hours as needed for moderate pain       oxyCODONE-acetaminophen (PERCOCET) 5-325 MG per tablet Take 1-2 tablets by mouth every 6 hours as needed for moderate to severe pain 10 tablet 0     clindamycin (CLEOCIN) 300 MG capsule Take 1 capsule (300 mg) by mouth 4 times daily for 10 days 40 capsule 0      No Known Allergies        I have reviewed the Medications, Allergies, Past Medical and Surgical History, and Social History in the Epic system.    Review of Systems   Constitutional: Negative for chills, diaphoresis and fever.   HENT: Negative for ear pain, sinus pressure and sore throat.   "  Eyes: Negative for visual disturbance.   Respiratory: Positive for cough, shortness of breath and wheezing.    Cardiovascular: Positive for chest pain. Negative for palpitations.   Gastrointestinal: Negative for abdominal pain, blood in stool, constipation, diarrhea, nausea and vomiting.   Genitourinary: Negative for dysuria, frequency and urgency.   Skin: Negative for rash.   Neurological: Negative for headaches.   All other systems reviewed and are negative.        Physical Exam   BP: 124/79  Pulse: 81  Heart Rate: 89  Temp: 97.9  F (36.6  C)  Resp: 18  Height: 180.3 cm (5' 11\")  Weight: 57.2 kg (126 lb)  SpO2: 100 %  Physical Exam   Constitutional: No distress.   HENT:   Mouth/Throat: Oropharynx is clear and moist.   Cardiovascular: Normal rate.  Exam reveals no gallop and no friction rub.    No murmur heard.  Pulmonary/Chest: Effort normal. No respiratory distress. He has no wheezes. He has no rales.   Abdominal: He exhibits no distension. There is no tenderness. There is no rebound and no guarding.   Skin: No rash noted. He is diaphoretic.     breath sounds appeared to be reduced - but the change was subtle and in retrospect.       ED Course     ED Course     Chest tube insertion  Performed by: FUENTES GOODWIN  Authorized by: FUENTES GOODWIN   Consent: Verbal consent obtained. Written consent obtained.  Risks and benefits: risks, benefits and alternatives were discussed  Consent given by: patient  Patient understanding: patient states understanding of the procedure being performed  Patient consent: the patient's understanding of the procedure matches consent given  Procedure consent: procedure consent matches procedure scheduled  Site marked: the operative site was marked  Imaging studies: imaging studies available  Patient identity confirmed: verbally with patient  Indications: pneumothorax  Sedation:  Patient sedated: yes  Sedatives: propofol    Anesthesia: local infiltration    Anesthesia:  Anesthesia: local " infiltration  Local Anesthetic: lidocaine 1% without epinephrine   Anesthetic total: 10 mL  Preparation: skin prepped with Hibeclense  Placement location: left lateral  Scalpel size: 11  Tube size: 8 (pigtail catheter) [place]  Ultrasound guidance: no  Tension pneumothorax heard: no  Tube connected to: Heimlich valve  Drainage characteristics: clear  Drainage amount (ml): withdrew air using a stopcock valve and a 20 cc syringe with multiple repititions.  Suture material: 2-0 silk  Dressing: petrolatum-impregnated gauze  Post-insertion x-ray findings: tube in good position  Patient tolerance: Patient tolerated the procedure well with no immediate complications                   Critical Care time:  none             Results for orders placed or performed during the hospital encounter of 04/04/17   Chest XR,  PA & LAT    Narrative    XR CHEST 2 VW 4/4/2017 1:02 PM    HISTORY: Left-sided tightness.    COMPARISON: 3/12/2015.      Impression    IMPRESSION: 2 views of the chest show a a left-sided pneumothorax  which is larger compared to the prior study. It measures 3.7 cm of  lucency in the apical area and 1.2 cm of lucency laterally in the left  mid to lower lung zone. There is no associated tension.     YASMIN ORDONEZ MD   Chest  XR, 1 view portable    Narrative    CHEST ONE VIEW PORTABLE  4/4/2017 2:28 PM     HISTORY: Chest tube placement.    COMPARISON: Chest x-ray from earlier the same day.      Impression    IMPRESSION: A left chest tube has been placed. The left lung has  partially reexpanded, but a small pneumothorax remains.    JAC RICHARDS MD   XR Chest 1 View    Narrative    XR CHEST 1 VW 4/4/2017 3:14 PM    HISTORY: Chest tube placement.    COMPARISON: Frontal view the chest done at 1421 hours today.      Impression    IMPRESSION: A single lateral view the chest shows the small bore chest  tube tip to project anterior at the junction of the middle one third  and anterior one third of the chest. More proximally  there appears to  be a kink in the tube.    YASMIN ORDONEZ MD   Chest XR,  PA & LAT    Narrative    CHEST TWO VIEWS  4/4/2017 5:44 PM     HISTORY: Recheck chest tube placement and re-expansion.    COMPARISON: 4/4/2017 at 1421 hours      Impression    IMPRESSION: Chest catheter has been pulled back somewhat and the lung  is better expanded laterally, although there is still pneumothorax at  the left apex, about 10% and up to 2.0 cm from the left thoracic apex.  Minimal left pleural effusion.         Assessments & Plan (with Medical Decision Making)       MDM: Mario Posada is a 31 year old male presented to the emergency department with prior history of spontaneous pneumothorax and has one present today.  this was relatively small approximately 20% left-sided pneumothorax.  After informed consent to pigtail catheter was inserted.  Position was confirmed with x-ray. x-rays were repeated in approximately 1.5 hours demonstrated significantly less pneumothorax present on the second image period of notice or was some CT kinking visualized on the first x-ray, but I could withdraw error easily via the tube and I did pull the tube back at this point a few  centimeters. the repeat x-ray was performed after I'd left the department but viewed by Dr. Fleming who I signed the patient out to prior to departure.      I set the patient up with chest tube removal for Friday with Dr. Chambers in the surgery clinic.  he is given precautions for return. Percocet prescription for pain and also recommended ibuprofen.      I have reviewed the nursing notes.    I have reviewed the findings, diagnosis, plan and need for follow up with the patient.    Discharge Medication List as of 4/4/2017  5:57 PM      START taking these medications    Details   oxyCODONE-acetaminophen (PERCOCET) 5-325 MG per tablet Take 1-2 tablets by mouth every 6 hours as needed for moderate to severe pain, Disp-10 tablet, R-0, Local Print             Final diagnoses:    Pneumothorax on left - Take ibuprofen 600 mg every 6 hours scheduled.  Take percocet for breakthrough pain.  follow-up in surgery clinic thursday. return for worsening.       4/4/2017   [facility] EMERGENCY DEPARTMENT     Fabio Finnegan MD  04/04/17 [id number]

## 2017-04-06 ENCOUNTER — HOSPITAL ENCOUNTER (EMERGENCY)
Facility: CLINIC | Age: 32
Discharge: HOME | End: 2017-04-06
Attending: EMERGENCY MEDICINE | Admitting: EMERGENCY MEDICINE
Payer: MEDICAID

## 2017-04-06 ENCOUNTER — APPOINTMENT (OUTPATIENT)
Dept: GENERAL RADIOLOGY | Facility: CLINIC | Age: 32
End: 2017-04-06
Attending: EMERGENCY MEDICINE
Payer: MEDICAID

## 2017-04-06 VITALS
TEMPERATURE: 97.9 F | RESPIRATION RATE: 18 BRPM | HEART RATE: 72 BPM | OXYGEN SATURATION: 96 % | DIASTOLIC BLOOD PRESSURE: 82 MMHG | BODY MASS INDEX: 17.57 KG/M2 | WEIGHT: 126 LBS | SYSTOLIC BLOOD PRESSURE: 136 MMHG

## 2017-04-06 DIAGNOSIS — J93.83 SPONTANEOUS PNEUMOTHORAX: ICD-10-CM

## 2017-04-06 PROCEDURE — 99283 EMERGENCY DEPT VISIT LOW MDM: CPT | Performed by: EMERGENCY MEDICINE

## 2017-04-06 PROCEDURE — 71020 XR CHEST 2 VW: CPT

## 2017-04-06 PROCEDURE — 25000132 ZZH RX MED GY IP 250 OP 250 PS 637: Performed by: EMERGENCY MEDICINE

## 2017-04-06 PROCEDURE — 99283 EMERGENCY DEPT VISIT LOW MDM: CPT

## 2017-04-06 RX ORDER — OXYCODONE AND ACETAMINOPHEN 5; 325 MG/1; MG/1
2 TABLET ORAL ONCE
Status: COMPLETED | OUTPATIENT
Start: 2017-04-06 | End: 2017-04-06

## 2017-04-06 RX ADMIN — OXYCODONE HYDROCHLORIDE AND ACETAMINOPHEN 2 TABLET: 5; 325 TABLET ORAL at 15:46

## 2017-04-06 NOTE — ED AVS SNAPSHOT
[facility] Emergency Department    [address] [address]    Phone:  [phone]    Fax:  [phone]                                       Mario Posada   MRN: [id number]    Department:  [facility] Emergency Department   Date of Visit:  4/6/2017           Patient Information     Date Of Birth          1985        Your diagnoses for this visit were:     Spontaneous pneumothorax        You were seen by Fabio Dowd MD.        Discharge Instructions       Follow-up with general surgery tomorrow as scheduled, return here for pain, shortness of air, fever, faintness or any other concern.    Future Appointments        Provider Department Dept Phone Center    4/7/2017 8:30 AM Jose Chambers MD [facility] [phone] [facility]      24 Hour Appointment Hotline       To make an appointment at any [facility], call [phone] ([phone]). If you don't have a family doctor or clinic, we will help you find one. [facility] are conveniently located to serve the needs of you and your family.             Review of your medicines      Our records show that you are taking the medicines listed below. If these are incorrect, please call your family doctor or clinic.        Dose / Directions Last dose taken    clindamycin 300 MG capsule   Commonly known as:  CLEOCIN   Dose:  300 mg   Quantity:  40 capsule        Take 1 capsule (300 mg) by mouth 4 times daily for 10 days   Refills:  0        IBUPROFEN PO   Dose:  800 mg        Take 800 mg by mouth every 8 hours as needed for moderate pain   Refills:  0        oxyCODONE-acetaminophen 5-325 MG per tablet   Commonly known as:  PERCOCET   Dose:  1-2 tablet   Quantity:  10 tablet        Take 1-2 tablets by mouth every 6 hours as needed for moderate to severe pain   Refills:  0                Procedures and tests performed during your visit     Chest XR,  PA & LAT      Orders Needing Specimen Collection     None     "  Pending Results     No orders found from 2017 to 2017.            Pending Culture Results     No orders found from 2017 to 2017.            Test Results From Your Hospital Stay        2017  4:13 PM      Narrative     CHEST TWO VIEWS   2017 4:00 PM     HISTORY: Recheck spontaneous pneumothorax.    COMPARISON: 2017.    FINDINGS: Very small left apical pneumothorax which is decreased in  size since 2017. Right lung remains clear. No infiltrates. There  is a small caliber left chest tube in place.        Impression     IMPRESSION: Tiny left apical pneumothorax, decreased in size since  2017. Small caliber left chest tube remains in place.    YASMIN MORE MD                Thank you for choosing [facility]       Thank you for choosing [facility] for your care. Our goal is always to provide you with excellent care. Hearing back from our patients is one way we can continue to improve our services. Please take a few minutes to complete the written survey that you may receive in the mail after you visit with us. Thank you!        [employer] Information     [employer] lets you send messages to your doctor, view your test results, renew your prescriptions, schedule appointments and more. To sign up, go to [email or web address] . Click on \"Log in\" on the left side of the screen, which will take you to the Welcome page. Then click on \"Sign up Now\" on the right side of the page.     You will be asked to enter the access code listed below, as well as some personal information. Please follow the directions to create your username and password.     Your access code is: [email or web address]  Expires: 2017  4:04 PM     Your access code will  in 90 days. If you need help or a new code, please call your [facility] clinic or [phone].        Care EveryWhere ID     This is your Care EveryWhere ID. This could be used by other organizations to access your [facility] medical records  [id number]      "   After Visit Summary       This is your record. Keep this with you and show to your community pharmacist(s) and doctor(s) at your next visit.

## 2017-04-06 NOTE — ED AVS SNAPSHOT
[facility] Emergency Department    [address] [address]    Phone:  [phone]    Fax:  [phone]                                       Mario Posada   MRN: [id number]    Department:  [facility] Emergency Department   Date of Visit:  4/6/2017           After Visit Summary Signature Page     I have received my discharge instructions, and my questions have been answered. I have discussed any challenges I see with this plan with the nurse or doctor.    ..........................................................................................................................................  Patient/Patient Representative Signature      ..........................................................................................................................................  Patient Representative Print Name and Relationship to Patient    ..................................................               ................................................  Date                                            Time    ..........................................................................................................................................  Reviewed by Signature/Title    ...................................................              ..............................................  Date                                                            Time

## 2017-04-06 NOTE — ED NOTES
Chest tube placed on 4-4-17 pt noticing fluid in chest tube no pain, scheduled to have this removed 4-7-17 clearish fluid in tube left side

## 2017-04-06 NOTE — DISCHARGE INSTRUCTIONS
Follow-up with general surgery tomorrow as scheduled, return here for pain, shortness of air, fever, faintness or any other concern.

## 2017-04-07 ENCOUNTER — OFFICE VISIT (OUTPATIENT)
Dept: SURGERY | Facility: CLINIC | Age: 32
End: 2017-04-07
Payer: MEDICAID

## 2017-04-07 VITALS
SYSTOLIC BLOOD PRESSURE: 133 MMHG | BODY MASS INDEX: 17.64 KG/M2 | DIASTOLIC BLOOD PRESSURE: 83 MMHG | HEART RATE: 77 BPM | TEMPERATURE: 98.1 F | WEIGHT: 126 LBS | HEIGHT: 71 IN

## 2017-04-07 DIAGNOSIS — J93.83 SPONTANEOUS PNEUMOTHORAX: Primary | ICD-10-CM

## 2017-04-07 PROCEDURE — 99212 OFFICE O/P EST SF 10 MIN: CPT | Performed by: SURGERY

## 2017-04-07 NOTE — PROGRESS NOTES
31-year-old male here for follow-up of left spontaneous pneumothorax. Patient had his chest tube removed yesterday in the emergency room and reports no additional difficulty breathing. We reviewed the results of his CT scan from last year and located the bleb on the patient's left apical lung. Patient's previous pneumothorax was also in his left lung. I suggested that if this happens again, I recommend he have a conversation with the thoracic surgeons at the [facility] for possible blebectomy. Patient understood and will continue a conservative treatment course.    Jose Chambers MD

## 2017-04-07 NOTE — NURSING NOTE
"Initial /83 (BP Location: Right arm, Patient Position: Chair, Cuff Size: Adult Regular)  Pulse 77  Temp 98.1  F (36.7  C) (Oral)  Ht 1.803 m (5' 11\")  Wt 57.2 kg (126 lb)  BMI 17.57 kg/m2 Estimated body mass index is 17.57 kg/(m^2) as calculated from the following:    Height as of this encounter: 1.803 m (5' 11\").    Weight as of this encounter: 57.2 kg (126 lb). .    Cass Carey MA    "

## 2017-04-07 NOTE — MR AVS SNAPSHOT
"              After Visit Summary   2017    Mario Posada    MRN: [id number]           Patient Information     Date Of Birth          1985        Visit Information        Provider Department      2017 8:30 AM Jose Chambers MD [facility]        Today's Diagnoses     Spontaneous pneumothorax    -  1      Care Instructions    Per Dr. Chambers's instructions        Follow-ups after your visit        Follow-up notes from your care team     Return if symptoms worsen or fail to improve.      Who to contact     If you have questions or need follow up information about today's clinic visit or your schedule please contact [facility] directly at [phone].  Normal or non-critical lab and imaging results will be communicated to you by MyChart, letter or phone within 4 business days after the clinic has received the results. If you do not hear from us within 7 days, please contact the clinic through MyChart or phone. If you have a critical or abnormal lab result, we will notify you by phone as soon as possible.  Submit refill requests through [facility] or call your pharmacy and they will forward the refill request to us. Please allow 3 business days for your refill to be completed.          Additional Information About Your Visit        MyChart Information     [facility] lets you send messages to your doctor, view your test results, renew your prescriptions, schedule appointments and more. To sign up, go to [email or web address] . Click on \"Log in\" on the left side of the screen, which will take you to the Welcome page. Then click on \"Sign up Now\" on the right side of the page.     You will be asked to enter the access code listed below, as well as some personal information. Please follow the directions to create your username and password.     Your access code is: [email or web address]  Expires: 2017  4:04 PM     Your access code will  in 90 days. If you need help or a new code, please " "call your [facility] clinic or [phone].        Care EveryWhere ID     This is your Care EveryWhere ID. This could be used by other organizations to access your [facility] medical records  [id number]        Your Vitals Were     Pulse Temperature Height BMI (Body Mass Index)          77 98.1  F (36.7  C) (Oral) 1.803 m (5' 11\") 17.57 kg/m2         Blood Pressure from Last 3 Encounters:   04/07/17 133/83   04/06/17 136/82   04/04/17 118/70    Weight from Last 3 Encounters:   04/07/17 57.2 kg (126 lb)   04/06/17 57.2 kg (126 lb)   04/04/17 57.2 kg (126 lb)              Today, you had the following     No orders found for display       Primary Care Provider    Unknown Primary MD Lee       No address on file        Thank you!     Thank you for choosing [facility]  for your care. Our goal is always to provide you with excellent care. Hearing back from our patients is one way we can continue to improve our services. Please take a few minutes to complete the written survey that you may receive in the mail after your visit with us. Thank you!             Your Updated Medication List - Protect others around you: Learn how to safely use, store and throw away your medicines at www.disposemymeds.org.          This list is accurate as of: 4/7/17  8:46 AM.  Always use your most recent med list.                   Brand Name Dispense Instructions for use    clindamycin 300 MG capsule    CLEOCIN    40 capsule    Take 1 capsule (300 mg) by mouth 4 times daily for 10 days       IBUPROFEN PO      Take 800 mg by mouth every 8 hours as needed for moderate pain       oxyCODONE-acetaminophen 5-325 MG per tablet    PERCOCET    10 tablet    Take 1-2 tablets by mouth every 6 hours as needed for moderate to severe pain         "

## 2017-04-09 ENCOUNTER — APPOINTMENT (OUTPATIENT)
Dept: GENERAL RADIOLOGY | Facility: CLINIC | Age: 32
End: 2017-04-09
Attending: EMERGENCY MEDICINE
Payer: MEDICAID

## 2017-04-09 ENCOUNTER — HOSPITAL ENCOUNTER (EMERGENCY)
Facility: CLINIC | Age: 32
Discharge: HOME | End: 2017-04-09
Attending: EMERGENCY MEDICINE | Admitting: EMERGENCY MEDICINE
Payer: MEDICAID

## 2017-04-09 VITALS
SYSTOLIC BLOOD PRESSURE: 138 MMHG | RESPIRATION RATE: 18 BRPM | TEMPERATURE: 98.3 F | WEIGHT: 126 LBS | HEART RATE: 118 BPM | BODY MASS INDEX: 17.57 KG/M2 | OXYGEN SATURATION: 99 % | DIASTOLIC BLOOD PRESSURE: 72 MMHG

## 2017-04-09 DIAGNOSIS — S29.019A THORACIC MYOFASCIAL STRAIN, INITIAL ENCOUNTER: ICD-10-CM

## 2017-04-09 PROCEDURE — 71020 XR CHEST 2 VW: CPT

## 2017-04-09 PROCEDURE — 99283 EMERGENCY DEPT VISIT LOW MDM: CPT | Mod: 25

## 2017-04-09 PROCEDURE — 99284 EMERGENCY DEPT VISIT MOD MDM: CPT | Performed by: EMERGENCY MEDICINE

## 2017-04-09 RX ORDER — HYDROCODONE BITARTRATE AND ACETAMINOPHEN 5; 325 MG/1; MG/1
1-2 TABLET ORAL EVERY 4 HOURS PRN
Qty: 15 TABLET | Refills: 0 | Status: SHIPPED | OUTPATIENT
Start: 2017-04-09 | End: 2019-03-04

## 2017-04-09 NOTE — ED NOTES
Pt comes in today with left sided mid to upper back pain, states recent pneumothorax, had chest tube removed last Thursday. States when lung problems begin, he feels this pain in his back and wanted to be sure it wasn't lung related. Incision from chest tube L axiliary like clean, dry, approximated. Tegaderm in place. Muscle knot noted L side of thoracic spine, Pt states tenderness. States pain 4/10 with 8/10 sharp pain on inspiration.

## 2017-04-09 NOTE — ED AVS SNAPSHOT
[facility] Emergency Department    [address] [facility] [address]    Phone:  [phone]    Fax:  [phone]                                       Mario Posada   MRN: [id number]    Department:  [facility] Emergency Department   Date of Visit:  4/9/2017           Patient Information     Date Of Birth          1985        Your diagnoses for this visit were:     Thoracic myofascial strain, initial encounter Left thoracic back       You were seen by Justin Newman MD.      Follow-up Information     Schedule an appointment as soon as possible for a visit with Primary DrJaxon MD.    Why:  Follow-up and primary care clinic: Call [phone]        Schedule an appointment as soon as possible for a visit with Pulmonology Clinic or Thoracic Surgery Clinic.    Contact information:    Thoracic Surgery: [phone]    Pulmonology Clinic: [phone]        Schedule an appointment as soon as possible for a visit with Orthopaedics, [facility].    Why:  For follow-up of left shoulder problem    Contact information:    [facility] [address] [address]  [phone]        24 Hour Appointment Hotline       To make an appointment at any [facility], call [phone] ([phone]). If you don't have a family doctor or clinic, we will help you find one. [facility] [place] [facility] are conveniently located to serve the needs of you and your family.             Review of your medicines      START taking        Dose / Directions Last dose taken    HYDROcodone-acetaminophen 5-325 MG per tablet   Commonly known as:  NORCO   Dose:  1-2 tablet   Quantity:  15 tablet        Take 1-2 tablets by mouth every 4 hours as needed for moderate to severe pain   Refills:  0          Our records show that you are taking the medicines listed below. If these are incorrect, please call your family doctor or clinic.        Dose / Directions Last dose taken    clindamycin 300 MG capsule   Commonly known as:   CLEOCIN   Dose:  300 mg   Quantity:  40 capsule        Take 1 capsule (300 mg) by mouth 4 times daily for 10 days   Refills:  0        IBUPROFEN PO   Dose:  800 mg        Take 800 mg by mouth every 8 hours as needed for moderate pain   Refills:  0        oxyCODONE-acetaminophen 5-325 MG per tablet   Commonly known as:  PERCOCET   Dose:  1-2 tablet   Quantity:  10 tablet        Take 1-2 tablets by mouth every 6 hours as needed for moderate to severe pain   Refills:  0                Prescriptions were sent or printed at these locations (1 Prescription)                   Other Prescriptions                Printed at Department/Unit printer (1 of 1)         HYDROcodone-acetaminophen (NORCO) 5-325 MG per tablet                Procedures and tests performed during your visit     Chest XR,  PA & LAT      Orders Needing Specimen Collection     None      Pending Results     Date and Time Order Name Status Description    4/9/2017 1559 Chest XR,  PA & LAT Preliminary             Pending Culture Results     No orders found from 4/7/2017 to 4/10/2017.            Test Results From Your Hospital Stay        4/9/2017  4:11 PM      Narrative     XR CHEST 2 VW   4/9/2017 4:07 PM     HISTORY: hx of pneumothroax.    COMPARISON: Film dated 4/6/2017    FINDINGS: Left chest tube has been removed. There is probably still a  very small pneumothorax at the right apex but this is smaller than on  4/6/2017 The heart is negative.  The lungs are clear. The pulmonary  vasculature is normal.  The bones and soft tissues are unremarkable.        Impression     IMPRESSION: Further decrease in the size of the small left apical  pneumothorax.    Only a tiny residual pneumothorax appears to be  present.                Thank you for choosing [facility]       Thank you for choosing [facility] for your care. Our goal is always to provide you with excellent care. Hearing back from our patients is one way we can continue to improve our services. Please take a  "few minutes to complete the written survey that you may receive in the mail after you visit with us. Thank you!        [employer] Information     [employer] lets you send messages to your doctor, view your test results, renew your prescriptions, schedule appointments and more. To sign up, go to [email or web address] . Click on \"Log in\" on the left side of the screen, which will take you to the Welcome page. Then click on \"Sign up Now\" on the right side of the page.     You will be asked to enter the access code listed below, as well as some personal information. Please follow the directions to create your username and password.     Your access code is: [email or web address]  Expires: 2017  4:04 PM     Your access code will  in 90 days. If you need help or a new code, please call your [place] clinic or [phone].        Care EveryWhere ID     This is your Care EveryWhere ID. This could be used by other organizations to access your [place] medical records  [id number]        After Visit Summary       This is your record. Keep this with you and show to your community pharmacist(s) and doctor(s) at your next visit.                  "

## 2017-04-09 NOTE — ED PROVIDER NOTES
"  History     Chief Complaint   Patient presents with     Chest Pain     pain with deep breath, hx of pneumothorax.      HPI  Mario Posada is a 31 year old male who developed sudden onset of left mid-and upper back pain while \"cracking my back\" yesterday.  He is concerned about recurrent pneumothorax as he had a 2nd pneumothorax recently and had a small pigtail catheter removed just 4 days ago.  He describes the injury as placing his hands behind his back and then arching his back to stretch it.  He felt a sudden sharp pain which is dull and mild or moderate in severity at rest and worse and sharp and severe with deep respiration and movement of the trunk and torso.  Pain is nonradiating.  No shortness of breath, cough or hemoptysis.   No neuro abnormality.  No midline back tenderness.   He has not yet scheduled follow-up with pulmonology or thoracic surgery for follow-up of his recurrent pneumothorax.    I have reviewed the Medications, Allergies, Past Medical and Surgical History, and Social History in the Epic system.  Patient Active Problem List   Diagnosis     Pneumothorax on left     Acute pancreatitis     Alcohol-induced pancreatitis     Alcoholic pancreatitis     No past medical history on file.  No past surgical history on file.  No current facility-administered medications for this encounter.      Current Outpatient Prescriptions   Medication     HYDROcodone-acetaminophen (NORCO) 5-325 MG per tablet     IBUPROFEN PO     oxyCODONE-acetaminophen (PERCOCET) 5-325 MG per tablet     clindamycin (CLEOCIN) 300 MG capsule     No Known Allergies  Social History   Substance Use Topics     Smoking status: Former Smoker     Packs/day: 0.50     Years: 9.00     Types: Cigarettes     Smokeless tobacco: Never Used     Alcohol use Yes     Family History   Problem Relation Age of Onset     Unknown/Adopted Father        Review of Systems  Chronic left shoulder pain/\" popping\"  As mentioned above in the history present " illness.  All other systems were reviewed and are negative.    Physical Exam   Pulse: 118  Temp: 98.3  F (36.8  C)  Resp: 18  Weight: 57.2 kg (126 lb)  SpO2: 100 %    Physical Exam   Constitutional: He is oriented to person, place, and time. He appears well-developed and well-nourished. No distress.   HENT:   Head: Normocephalic and atraumatic.   Eyes: Conjunctivae and EOM are normal. No scleral icterus.   Neck: Normal range of motion. Neck supple. No JVD present. No tracheal deviation present. No thyromegaly present.   Cardiovascular: Normal rate, regular rhythm and normal heart sounds.  Exam reveals no gallop and no friction rub.    No murmur heard.  Pulmonary/Chest: Effort normal and breath sounds normal. No respiratory distress. He has no wheezes. He has no rales. He exhibits tenderness (left posterior thoracic chest wall tenderness as diagrammed,  no ecchymosis, swelling or crepitance).   Abdominal: Soft. Bowel sounds are normal. He exhibits no distension and no mass. There is no tenderness. There is no rebound and no guarding.   Musculoskeletal: Normal range of motion. He exhibits no edema.        Thoracic back: He exhibits tenderness. He exhibits normal range of motion, no bony tenderness, no swelling and no edema.        Back:    Neurological: He is alert and oriented to person, place, and time.   Skin: Skin is warm and dry. No rash noted. He is not diaphoretic. No erythema. No pallor.   Psychiatric: He has a normal mood and affect. His behavior is normal.   Nursing note and vitals reviewed.      ED Course     ED Course     Procedures        Results for orders placed or performed during the hospital encounter of 04/09/17   Chest XR,  PA & LAT    Narrative    XR CHEST 2 VW   4/9/2017 4:07 PM     HISTORY: hx of pneumothroax.    COMPARISON: Film dated 4/6/2017    FINDINGS: Left chest tube has been removed. There is probably still a  very small pneumothorax at the right apex but this is smaller than on  4/6/2017  The heart is negative.  The lungs are clear. The pulmonary  vasculature is normal.  The bones and soft tissues are unremarkable.      Impression    IMPRESSION: Further decrease in the size of the small left apical  pneumothorax.    Only a tiny residual pneumothorax appears to be  present.    JERALD SALTER MD     I independently reviewed the X-rays: Agree with the Radiologist's interpretation, although there appears to be minimal to no residual pneumothorax to me.         Assessments & Plan (with Medical Decision Making)   Patient with recent recurrent left pneumothorax with left thoracic chest wall pain which appears to be musculoskeletal and benign in nature.  Small left chest tube removal 4 days ago and does not appear to be recurrence of pneumothorax.  He was instructed on supportive care, Rx norco X 15 tablets.  Referred to pulmonology and/or thoracic surgery clinic. Patient was provided instructions for supportive care and will return as needed for worsened condition or worsening symptoms, or new problems or concerns.      I have reviewed the nursing notes.    I have reviewed the findings, diagnosis, plan and need for follow up with the patient.    Discharge Medication List as of 4/9/2017  6:19 PM      START taking these medications    Details   HYDROcodone-acetaminophen (NORCO) 5-325 MG per tablet Take 1-2 tablets by mouth every 4 hours as needed for moderate to severe pain, Disp-15 tablet, R-0, Local Print             Final diagnoses:   Thoracic myofascial strain, initial encounter - Left thoracic back       4/9/2017   [facility] EMERGENCY DEPARTMENT     Justin Newman MD  04/09/17 7162

## 2017-04-09 NOTE — ED NOTES
"Pt comes in with concerns of recurrent pneumothorax. HX of this recently with chest tube. However, had tube removed on Thursday. Now today after \" trying to crack my back\" noted that pain was back and concerned about pneumo. Chg RN updated, XRay ordered and sent to radiology for evaluation.   "

## 2017-04-09 NOTE — ED AVS SNAPSHOT
[facility] Emergency Department    [address] [facility] [address]    Phone:  [phone]    Fax:  [phone]                                       Mario Posada   MRN: [id number]    Department:  [facility] Emergency Department   Date of Visit:  4/9/2017           After Visit Summary Signature Page     I have received my discharge instructions, and my questions have been answered. I have discussed any challenges I see with this plan with the nurse or doctor.    ..........................................................................................................................................  Patient/Patient Representative Signature      ..........................................................................................................................................  Patient Representative Print Name and Relationship to Patient    ..................................................               ................................................  Date                                            Time    ..........................................................................................................................................  Reviewed by Signature/Title    ...................................................              ..............................................  Date                                                            Time

## 2018-02-01 ENCOUNTER — APPOINTMENT (OUTPATIENT)
Dept: GENERAL RADIOLOGY | Facility: CLINIC | Age: 33
End: 2018-02-01
Attending: PHYSICIAN ASSISTANT
Payer: COMMERCIAL

## 2018-02-01 ENCOUNTER — HOSPITAL ENCOUNTER (EMERGENCY)
Facility: CLINIC | Age: 33
Discharge: HOME | End: 2018-02-01
Attending: PHYSICIAN ASSISTANT | Admitting: PHYSICIAN ASSISTANT
Payer: COMMERCIAL

## 2018-02-01 VITALS
SYSTOLIC BLOOD PRESSURE: 133 MMHG | RESPIRATION RATE: 14 BRPM | TEMPERATURE: 98 F | DIASTOLIC BLOOD PRESSURE: 92 MMHG | OXYGEN SATURATION: 100 %

## 2018-02-01 DIAGNOSIS — M25.512 ACUTE PAIN OF LEFT SHOULDER: Primary | ICD-10-CM

## 2018-02-01 PROCEDURE — 71046 X-RAY EXAM CHEST 2 VIEWS: CPT

## 2018-02-01 PROCEDURE — 73030 X-RAY EXAM OF SHOULDER: CPT | Mod: LT

## 2018-02-01 PROCEDURE — G0463 HOSPITAL OUTPT CLINIC VISIT: HCPCS | Mod: 25

## 2018-02-01 PROCEDURE — 99213 OFFICE O/P EST LOW 20 MIN: CPT | Performed by: PHYSICIAN ASSISTANT

## 2018-02-01 ASSESSMENT — ENCOUNTER SYMPTOMS
CONSTITUTIONAL NEGATIVE: 1
SHORTNESS OF BREATH: 1

## 2018-02-01 NOTE — ED AVS SNAPSHOT
[facility] Emergency Department    [address] [facility] [address]    Phone:  [phone]    Fax:  [phone]                                       Mario Posada   MRN: [id number]    Department:  [facility] Emergency Department   Date of Visit:  2/1/2018           Patient Information     Date Of Birth          1985        Your diagnoses for this visit were:     Acute pain of left shoulder        You were seen by Anjali Clarke PA-C.      Follow-up Information     Follow up with [facility]. Call in 1 week.    Specialty:  Orthopedics and Sports Medicine    Why:  As needed, For persistent symptoms    Contact information:    [address] [place] [address]  [phone]    Additional information:    The medical center is located at   [address] [place] [address] (between [facility] and   [facility] [address] [place] [address], four miles north   of [place]).        Follow up with [facility] Emergency Department.    Specialty:  EMERGENCY MEDICINE    Why:  As needed, If symptoms worsen    Contact information:    [facility] [address] [phone]    Additional information:    The medical center is located at   [address] [place] [address] (between [facility] and   [address], four miles north   of [place]).      Discharge References/Attachments     SHOULDER PAIN, UNCERTAIN CAUSE (ENGLISH)      24 Hour Appointment Hotline       To make an appointment at any [facility] clinic, call [phone] ([phone]). If you don't have a family doctor or clinic, we will help you find one. [place] clinics are conveniently located to serve the needs of you and your family.             Review of your medicines      Our records show that you are taking the medicines listed below. If these are incorrect, please call your family doctor or clinic.        Dose / Directions Last dose taken    HYDROcodone-acetaminophen 5-325 MG per tablet    Commonly known as:  NORCO   Dose:  1-2 tablet   Quantity:  15 tablet        Take 1-2 tablets by mouth every 4 hours as needed for moderate to severe pain   Refills:  0        IBUPROFEN PO   Dose:  800 mg        Take 800 mg by mouth every 8 hours as needed for moderate pain   Refills:  0        oxyCODONE-acetaminophen 5-325 MG per tablet   Commonly known as:  PERCOCET   Dose:  1-2 tablet   Quantity:  10 tablet        Take 1-2 tablets by mouth every 6 hours as needed for moderate to severe pain   Refills:  0                Procedures and tests performed during your visit     Chest XR,  PA & LAT    Shoulder XR, 2 view left      Orders Needing Specimen Collection     None      Pending Results     Date and Time Order Name Status Description    2/1/2018 1240 Chest XR,  PA & LAT Preliminary     2/1/2018 1240 Shoulder XR, 2 view left Preliminary             Pending Culture Results     No orders found from 1/30/2018 to 2/2/2018.            Pending Results Instructions     If you had any lab results that were not finalized at the time of your Discharge, you can call the ED Lab Result RN at [phone]. You will be contacted by this team for any positive Lab results or changes in treatment. The nurses are available 7 days a week from 10A to 6:30P.  You can leave a message 24 hours per day and they will return your call.        Test Results From Your Hospital Stay        2/1/2018  1:26 PM      Narrative     LEFT SHOULDER TWO VIEWS 2/1/2018 12:54 PM     HISTORY: Left shoulder pain, acute on chronic.          Impression     IMPRESSION: Two views of the left shoulder are performed. No evidence  of fracture or dislocation. Imaged portions of the left clavicle and  scapula appear intact.           2/1/2018  1:27 PM      Narrative     CHEST TWO VIEWS 2/1/2018 12:54 PM     HISTORY: Rule out pneumothorax. History of this in past, and had a few  episodes of SOA (shortness of air).      COMPARISON: Chest x-ray 4/9/2017.       "  Impression     IMPRESSION: PA and lateral views of the chest. Lungs are clear. Heart  is normal in size. No effusions are evident. No pneumothorax.                  Thank you for choosing [facility]       Thank you for choosing [facility] for your care. Our goal is always to provide you with excellent care. Hearing back from our patients is one way we can continue to improve our services. Please take a few minutes to complete the written survey that you may receive in the mail after you visit with us. Thank you!        [employer] Information     [employer] lets you send messages to your doctor, view your test results, renew your prescriptions, schedule appointments and more. To sign up, go to [email or web address] . Click on \"Log in\" on the left side of the screen, which will take you to the Welcome page. Then click on \"Sign up Now\" on the right side of the page.     You will be asked to enter the access code listed below, as well as some personal information. Please follow the directions to create your username and password.     Your access code is: [email or web address]  Expires: 2018  1:35 PM     Your access code will  in 90 days. If you need help or a new code, please call your [facility] clinic or [phone].        Care EveryWhere ID     This is your Care EveryWhere ID. This could be used by other organizations to access your [facility] medical records  [id number]        Equal Access to Services     BRINANA WHITE : Hadtruong hercules Sosupriya, waaxda luqadaha, qaybta kaalreyes hurley. So [facility] [phone].    ATENCIÓN: Si habla español, tiene a germain disposición servicios gratuitos de asistencia lingüística. Mansoor al [phone].    We comply with applicable federal civil rights laws and [place] laws. We do not discriminate on the basis of race, color, national origin, age, disability, sex, sexual orientation, or gender identity.            After Visit Summary     "   This is your record. Keep this with you and show to your community pharmacist(s) and doctor(s) at your next visit.

## 2018-02-01 NOTE — ED AVS SNAPSHOT
[facility] Emergency Department    [address] [facility] [address]    Phone:  [phone]    Fax:  [phone]                                       Mario Posada   MRN: [id number]    Department:  [facility] Emergency Department   Date of Visit:  2/1/2018           After Visit Summary Signature Page     I have received my discharge instructions, and my questions have been answered. I have discussed any challenges I see with this plan with the nurse or doctor.    ..........................................................................................................................................  Patient/Patient Representative Signature      ..........................................................................................................................................  Patient Representative Print Name and Relationship to Patient    ..................................................               ................................................  Date                                            Time    ..........................................................................................................................................  Reviewed by Signature/Title    ...................................................              ..............................................  Date                                                            Time

## 2018-02-01 NOTE — ED NOTES
"Pt ambulatory into triage area. Here for shoulder pain, acute on chronic left shoulder pain. Worse as of late. Had a few intermittent episode of shortness of breath, none at present, but hx of pneumothorax and would \" like to be sure I don't have that again\" Pt is sitting in triage in NAD. Not winded, no resp distress, sats 100%. Imaging ordered, will re-evaluate upon results. Pt updated to POC, apologized for delay in care.   "

## 2018-02-01 NOTE — ED PROVIDER NOTES
History     Chief Complaint   Patient presents with     Shoulder Pain     chronic, no injury, history of collapsed lung and gets shoulder pain with     HPI  Mario Posada is a 32 year old male who presents with complaints of left shoulder pain for the past week.  Pt reports having had a spontaneous pneumothorax in the past and wants to rule that out again since he experienced shoulder pain at that time as well.  Pt reports intermittent shortness of breath since he stopped smoking.  No chest pain.  Denies fevers, chills, or cough.  Pt denies any preceding injury or trauma to his shoulder.  Pt reports increased pain in his shoulder with range of motion.      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 yes         Pneumothorax on left 03/10/2015     Priority: Medium        Past Medical History:    History reviewed. No pertinent past medical history.    Past Surgical History:    History reviewed. No pertinent surgical history.    Family History:    Family History   Problem Relation Age of Onset     Unknown/Adopted Father        Social History:  Marital Status:  Single [1]  Social History   Substance Use Topics     Smoking status: Former Smoker     Packs/day: 0.50     Years: 9.00     Types: Cigarettes     Smokeless tobacco: Never Used     Alcohol use Yes        Medications:      HYDROcodone-acetaminophen (NORCO) 5-325 MG per tablet   IBUPROFEN PO   oxyCODONE-acetaminophen (PERCOCET) 5-325 MG per tablet         Review of Systems   Constitutional: Negative.    HENT: Negative.    Respiratory: Positive for shortness of breath (intermittent).    Musculoskeletal:        Left shoulder pain   Skin: Negative.        Physical Exam   BP: (!) 133/92  Heart Rate: 84  Temp: 98   F (36.7  C)  Resp: 14  SpO2: 100 %      Physical Exam   Constitutional: He appears well-developed and well-nourished. No distress.   HENT:   Head: Normocephalic and atraumatic.   Cardiovascular: Normal rate, regular rhythm and normal heart sounds.    Pulmonary/Chest: Effort normal and breath sounds normal. No respiratory distress. He has no wheezes. He has no rales.   Musculoskeletal:        Left shoulder: He exhibits tenderness (along left trapezius muscle and along scapula). He exhibits normal range of motion, no swelling, no effusion, no crepitus, no deformity, no laceration, no pain, no spasm, normal pulse and normal strength.        Arms:  Neurological: He is alert. He has normal strength. No sensory deficit.   Skin: Skin is warm and dry.       ED Course     ED Course     Procedures    Results for orders placed or performed during the hospital encounter of 02/01/18   Shoulder XR, 2 view left    Narrative    LEFT SHOULDER TWO VIEWS 2/1/2018 12:54 PM     HISTORY: Left shoulder pain, acute on chronic.        Impression    IMPRESSION: Two views of the left shoulder are performed. No evidence  of fracture or dislocation. Imaged portions of the left clavicle and  scapula appear intact.     Chest XR,  PA & LAT    Narrative    CHEST TWO VIEWS 2/1/2018 12:54 PM     HISTORY: Rule out pneumothorax. History of this in past, and had a few  episodes of SOA (shortness of air).      COMPARISON: Chest x-ray 4/9/2017.      Impression    IMPRESSION: PA and lateral views of the chest. Lungs are clear. Heart  is normal in size. No effusions are evident. No pneumothorax.         Assessments & Plan (with Medical Decision Making)     Pt is a 32 year old male who presents with complaints of left shoulder pain for the past week.  Pt reports having had a spontaneous pneumothorax in the past and wants to rule that out again since he experienced shoulder pain at that time as well.  Pt reports intermittent shortness of breath since he  stopped smoking.  No chest pain.  Pt denies any preceding injury or trauma to his shoulder.  Pt reports increased pain in his shoulder with range of motion.  Pt is afebrile on arrival.  Exam as above.  X-rays of left shoulder and CXR were negative; no evidence of acute pathology or pneumothorax specifically.  Discussed results with patient.  Encouraged symptomatic treatments at home.  Hand-outs provided.    Patient was instructed to follow-up with PCP if no improvement in 5-7 days for continued care and management or sooner if new or worsening symptoms.  He is to return to the ED for persistent and/or worsening symptoms.  Patient expressed understanding of the diagnosis and plan and was discharged home in good condition.    I have reviewed the nursing notes.    I have reviewed the findings, diagnosis, plan and need for follow up with the patient.    New Prescriptions    No medications on file       Final diagnoses:   Acute pain of left shoulder       2/1/2018   [facility] EMERGENCY DEPARTMENT     Anjali Clarke PA-C  02/01/18 [id number]

## 2018-10-04 ENCOUNTER — HOSPITAL ENCOUNTER (EMERGENCY)
Facility: CLINIC | Age: 33
Discharge: HOME | End: 2018-10-04
Attending: EMERGENCY MEDICINE | Admitting: EMERGENCY MEDICINE

## 2018-10-04 VITALS
DIASTOLIC BLOOD PRESSURE: 114 MMHG | OXYGEN SATURATION: 97 % | BODY MASS INDEX: 18.13 KG/M2 | TEMPERATURE: 98.9 F | WEIGHT: 130 LBS | SYSTOLIC BLOOD PRESSURE: 147 MMHG | RESPIRATION RATE: 18 BRPM

## 2018-10-04 DIAGNOSIS — J20.9 ACUTE BRONCHITIS WITH BRONCHOSPASM: ICD-10-CM

## 2018-10-04 PROCEDURE — 99282 EMERGENCY DEPT VISIT SF MDM: CPT | Performed by: EMERGENCY MEDICINE

## 2018-10-04 PROCEDURE — 99284 EMERGENCY DEPT VISIT MOD MDM: CPT | Mod: Z6 | Performed by: EMERGENCY MEDICINE

## 2018-10-04 RX ORDER — ALBUTEROL SULFATE 90 UG/1
2 AEROSOL, METERED RESPIRATORY (INHALATION) EVERY 6 HOURS PRN
Qty: 1 INHALER | Refills: 0 | Status: SHIPPED | OUTPATIENT
Start: 2018-10-04 | End: 2018-10-27

## 2018-10-04 RX ORDER — AZITHROMYCIN 250 MG/1
TABLET, FILM COATED ORAL
Qty: 6 TABLET | Refills: 0 | Status: SHIPPED | OUTPATIENT
Start: 2018-10-04 | End: 2019-03-01

## 2018-10-04 NOTE — DISCHARGE INSTRUCTIONS
Acute Bronchitis  Your healthcare provider has told you that you have acute bronchitis. Bronchitis is infection or inflammation of the bronchial tubes (airways in the lungs). Normally, air moves easily in and out of the airways. Bronchitis narrows the airways, making it harder for air to flow in and out of the lungs. This causes symptoms such as shortness of breath, coughing up yellow or green mucus, and wheezing. Bronchitis can be acute or chronic. Acute means the condition comes on quickly and goes away in a short time, usually within 3 to 10 days. Chronic means a condition lasts a long time and often comes back.    What causes acute bronchitis?  Acute bronchitis almost always starts as a viral respiratory infection, such as a cold or the flu. Certain factors make it more likely for a cold or flu to turn into bronchitis. These include being very young, being elderly, having a heart or lung problem, or having a weak immune system. Cigarette smoking also makes bronchitis more likely.  When bronchitis develops, the airways become swollen. The airways may also become infected with bacteria. This is known as a secondary infection.  Diagnosing acute bronchitis  Your healthcare provider will examine you and ask about your symptoms and health history. You may also have a sputum culture to test the fluid in your lungs. Chest X-rays may be done to look for infection in the lungs.  Treating acute bronchitis  Bronchitis usually clears up as the cold or flu goes away. You can help feel better faster by doing the following:    Take medicine as directed. You may be told to take ibuprofen or other over-the-counter medicines. These help relieve inflammation in your bronchial tubes. Your healthcare provider may prescribe an inhaler to help open up the bronchial tubes. Most of the time, acute bronchitis is caused by a viral infection. Antibiotics are usually not prescribed for viral infections.    Drink plenty of fluids, such as  water, juice, or warm soup. Fluids loosen mucus so that you can cough it up. This helps you breathe more easily. Fluids also prevent dehydration.    Make sure you get plenty of rest.    Do not smoke. Do not allow anyone else to smoke in your home.  Recovery and follow-up  Follow up with your doctor as you are told. You will likely feel better in a week or two. But a dry cough can linger beyond that time. Let your doctor know if you still have symptoms (other than a dry cough) after 2 weeks, or if you re prone to getting bronchial infections. Take steps to protect yourself from future infections. These steps include stopping smoking and avoiding tobacco smoke, washing your hands often, and getting a yearly flu shot.  When to call your healthcare provider  Call the healthcare provider if you have any of the following:    Fever of 100.4 F (38.0 C) or higher, or as advised    Symptoms that get worse, or new symptoms    Trouble breathing    Symptoms that don t start to improve within a week, or within 3 days of taking antibiotics   Date Last Reviewed: 12/1/2016 2000-2017 [employer]. [address] [place] [address]. All rights reserved. This information is not intended as a substitute for professional medical care. Always follow your healthcare professional's instructions.          What Is Acute Bronchitis?  Acute bronchitis is when the airways in your lungs (bronchial tubes) become red and swollen (inflamed). It is usually caused by a viral infection. But it can also occur because of a bacteria or allergen. Symptoms include a cough that produces yellow or greenish mucus and can last for days or sometimes weeks.  Inside healthy lungs    Air travels in and out of the lungs through the airways. The linings of these airways produce sticky mucus. This mucus traps particles that enter the lungs. Tiny structures called cilia then sweep the particles out of the airways.     Healthy airway: Airways are  normally open. Air moves in and out easily.      Healthy cilia: Tiny, hairlike cilia sweep mucus and particles up and out of the airways.     Lungs with bronchitis  Bronchitis often occurs with a cold or the flu virus. The airways become inflamed (red and swollen). There is a deep hacking cough from the extra mucus. Other symptoms may include:    Wheezing    Chest discomfort    Shortness of breath    Mild fever  A second infection, this time due to bacteria, may then occur. And airways irritated by allergens or smoke are more likely to get infected.        Inflamed airway: Inflammation and extra mucus narrow the airway, causing shortness of breath.      Impaired cilia: Extra mucus impairs cilia, causing congestion and wheezing. Smoking makes the problem worse.     Making a diagnosis  A physical exam, health history, and certain tests help your healthcare provider make the diagnosis.  Health history  Your healthcare provider will ask you about your symptoms.  The exam  Your provider listens to your chest for signs of congestion. He or she may also check your ears, nose, and throat.  Possible tests    A sputum test for bacteria. This requires a sample of mucus from your lungs.    A nasal or throat swab. This tests to see if you have a bacterial infection.    A chest X-ray. This is done if your healthcare provider thinks you have pneumonia.    Tests to check for an underlying condition. Other tests may be done to check for things such as allergies, asthma, or COPD (chronic obstructive pulmonary disease). You may need to see a specialist for more lung function testing.  Treating a cough  The main treatment for bronchitis is easing symptoms. Avoiding smoke, allergens, and other things that trigger coughing can often help. If the infection is bacterial, you may be given antibiotics. During the illness, it's important to get plenty of sleep. To ease symptoms:    Don t smoke. Also avoid secondhand smoke.    Use a  humidifier. Or try breathing in steam from a hot shower. This may help loosen mucus.    Drink a lot of water and juice. They can soothe the throat and may help thin mucus.    Sit up or use extra pillows when in bed. This helps to lessen coughing and congestion.    Ask your provider about using medicine. Ask about using cough medicine, pain and fever medicine, or a decongestant.  Antibiotics  Most cases of bronchitis are caused by cold or flu viruses. They don t need antibiotics to treat them, even if your mucus is thick and green or yellow. Antibiotics don t treat viral illness and antibiotics have not been shown to have any benefit in cases of acute bronchitis. Taking antibiotics when they are not needed increases your risk of getting an infection later that is antibiotic-resistant. Antibiotics can also cause severe cases of diarrhea that require other antibiotics to treat.  It is important that you accept your healthcare provider's opinion to not use antibiotics. Your provider will prescribe antibiotics if the infection is caused by bacteria. If they are prescribed:    Take all of the medicine. Take the medicine until it is used up, even if symptoms have improved. If you don t, the bronchitis may come back.    Take the medicines as directed. For instance, some medicines should be taken with food.    Ask about side effects. Ask your provider or pharmacist what side effects are common, and what to do about them.  Follow-up care  You should see your provider again in 2 to 3 weeks. By this time, symptoms should have improved. An infection that lasts longer may mean you have a more serious problem.  Prevention    Avoid tobacco smoke. If you smoke, quit. Stay away from smoky places. Ask friends and family not to smoke around you, or in your home or car.    Get checked for allergies.    Ask your provider about getting a yearly flu shot. Also ask about pneumococcal or pneumonia shots.    Wash your hands often. This helps  reduce the chance of picking up viruses that cause colds and flu.  Call your healthcare provider if:    Symptoms worsen, or you have new symptoms    Breathing problems worsen or  become severe    Symptoms don t get better within a week, or within 3 days of taking antibiotics   Date Last Reviewed: 2/1/2017 2000-2017 [employer]. [address] [place] [address]. All rights reserved. This information is not intended as a substitute for professional medical care. Always follow your healthcare professional's instructions.          Viral or Bacterial Bronchitis with Wheezing (Adult)    Bronchitis is an infection of the air passages. It often occurs during a cold and is usually caused by a virus. Symptoms include cough with mucus (phlegm) and low-grade fever. This illness is contagious during the first few days and is spread through the air by coughing and sneezing, or by direct contact (touching the sick person and then touching your own eyes, nose, or mouth).  If there is a lot of inflammation, air flow is restricted. The air passages may also go into spasm, especially if you have asthma. This causes wheezing and difficulty breathing even in people who do not have asthma.  Bronchitis usually lasts 7 to 14 days. The wheezing should improve with treatment during the first week. An inhaler is often prescribed to relax the air passages and stop wheezing. Antibiotics will be prescribed if your doctor thinks there is also a secondary bacterial infection.  Home care    If symptoms are severe, rest at home for the first 2 to 3 days. When you go back to your usual activities, don't let yourself get too tired.    Do not smoke. Also avoid being exposed to secondhand smoke.    You may use over-the-counter medicine to control fever or pain, unless another medicine was prescribed. Note: If you have chronic liver or kidney disease or have ever had a stomach ulcer or gastrointestinal bleeding, talk with your  healthcare provider before using these medicines. Also talk to your provider if you are taking medicine to prevent blood clots.) Aspirin should never be given to anyone younger than 18 years of age who is ill with a viral infection or fever. It may cause severe liver or brain damage.    Your appetite may be poor, so a light diet is fine. Avoid dehydration by drinking 6 to 8 glasses of fluids per day (such as water, soft drinks, sports drinks, juices, tea, or soup). Extra fluids will help loosen secretions in the nose and lungs.    Over-the-counter cough, cold, and sore-throat medicines will not shorten the length of the illness, but they may be helpful to reduce symptoms. (Note: Do not use decongestants if you have high blood pressure.)    If you were given an inhaler, use it exactly as directed. If you need to use it more often than prescribed, your condition may be worsening. If this happens, contact your healthcare provider.    If prescribed, finish all antibiotic medicine, even if you are feeling better after only a few days.  Follow-up care  Follow up with your healthcare provider, or as advised. If you had an X-ray or ECG (electrocardiogram), a specialist will review it. You will be notified of any new findings that may affect your care.  If you are age 65 or older, or if you have a chronic lung disease or condition that affects your immune system, or you smoke, ask your healthcare provider about getting a pneumococcal vaccine and a yearly flu shot (influenza vaccine).  When to seek medical advice  Call your healthcare provider right away if any of these occur:    Fever of 100.4 F (38 C) or higher, or as directed by your healthcare provider    Coughing up increasing amounts of colored sputum    Weakness, drowsiness, headache, facial pain, ear pain, or a stiff neck  Call 911  Call 911 if any of these occur.    Coughing up blood    Worsening weakness, drowsiness, headache, or stiff neck    Increased wheezing not  helped with medication, shortness of breath, or pain with breathing  Date Last Reviewed: 9/13/2015 2000-2017 [employer]. [address] [place] [address], [place] [address]. All rights reserved. This information is not intended as a substitute for professional medical care. Always follow your healthcare professional's instructions.

## 2018-10-04 NOTE — ED TRIAGE NOTES
Patient states that he has had cough and congestion for past 10 days or so, states is getting worse and last night felt like he was becoming increasingly wheezy.

## 2018-10-04 NOTE — ED AVS SNAPSHOT
[facility] Emergency Department    [address] [address]    Phone:  [phone]    Fax:  [phone]                                       Mario Posada   MRN: [id number]    Department:  [facility] Emergency Department   Date of Visit:  10/4/2018           After Visit Summary Signature Page     I have received my discharge instructions, and my questions have been answered. I have discussed any challenges I see with this plan with the nurse or doctor.    ..........................................................................................................................................  Patient/Patient Representative Signature      ..........................................................................................................................................  Patient Representative Print Name and Relationship to Patient    ..................................................               ................................................  Date                                   Time    ..........................................................................................................................................  Reviewed by Signature/Title    ...................................................              ..............................................  Date                                               Time          22EPIC Rev 08/18

## 2018-10-04 NOTE — ED AVS SNAPSHOT
[facility] Emergency Department    [address] [facility] [address]    Phone:  [phone]    Fax:  [phone]                                       Mario Posada   MRN: [id number]    Department:  [facility] Emergency Department   Date of Visit:  10/4/2018           Patient Information     Date Of Birth          1985        Your diagnoses for this visit were:     Acute bronchitis, unspecified organism        You were seen by Justin Newman MD.      Follow-up Information     Follow up with No Ref-Primary, Physician In 4 days.    Why:  For re-evaluation if symptoms not resolving        Follow up with [facility] Emergency Department.    Specialty:  EMERGENCY MEDICINE    Why:  If symptoms worsen    Contact information:    [address] [place] [id number] [phone]    Additional information:    The medical center is located at   [address] [facility] (between [facility] and   [address] in [place], four miles north   of [place]).        Discharge Instructions         Acute Bronchitis  Your healthcare provider has told you that you have acute bronchitis. Bronchitis is infection or inflammation of the bronchial tubes (airways in the lungs). Normally, air moves easily in and out of the airways. Bronchitis narrows the airways, making it harder for air to flow in and out of the lungs. This causes symptoms such as shortness of breath, coughing up yellow or green mucus, and wheezing. Bronchitis can be acute or chronic. Acute means the condition comes on quickly and goes away in a short time, usually within 3 to 10 days. Chronic means a condition lasts a long time and often comes back.    What causes acute bronchitis?  Acute bronchitis almost always starts as a viral respiratory infection, such as a cold or the flu. Certain factors make it more likely for a cold or flu to turn into bronchitis. These include being very young, being elderly, having a heart or lung problem, or  having a weak immune system. Cigarette smoking also makes bronchitis more likely.  When bronchitis develops, the airways become swollen. The airways may also become infected with bacteria. This is known as a secondary infection.  Diagnosing acute bronchitis  Your healthcare provider will examine you and ask about your symptoms and health history. You may also have a sputum culture to test the fluid in your lungs. Chest X-rays may be done to look for infection in the lungs.  Treating acute bronchitis  Bronchitis usually clears up as the cold or flu goes away. You can help feel better faster by doing the following:    Take medicine as directed. You may be told to take ibuprofen or other over-the-counter medicines. These help relieve inflammation in your bronchial tubes. Your healthcare provider may prescribe an inhaler to help open up the bronchial tubes. Most of the time, acute bronchitis is caused by a viral infection. Antibiotics are usually not prescribed for viral infections.    Drink plenty of fluids, such as water, juice, or warm soup. Fluids loosen mucus so that you can cough it up. This helps you breathe more easily. Fluids also prevent dehydration.    Make sure you get plenty of rest.    Do not smoke. Do not allow anyone else to smoke in your home.  Recovery and follow-up  Follow up with your doctor as you are told. You will likely feel better in a week or two. But a dry cough can linger beyond that time. Let your doctor know if you still have symptoms (other than a dry cough) after 2 weeks, or if you re prone to getting bronchial infections. Take steps to protect yourself from future infections. These steps include stopping smoking and avoiding tobacco smoke, washing your hands often, and getting a yearly flu shot.  When to call your healthcare provider  Call the healthcare provider if you have any of the following:    Fever of 100.4 F (38.0 C) or higher, or as advised    Symptoms that get worse, or new  symptoms    Trouble breathing    Symptoms that don t start to improve within a week, or within 3 days of taking antibiotics   Date Last Reviewed: 12/1/2016 2000-2017 [employer]. [address] [place] [address], [place] [address]. All rights reserved. This information is not intended as a substitute for professional medical care. Always follow your healthcare professional's instructions.          What Is Acute Bronchitis?  Acute bronchitis is when the airways in your lungs (bronchial tubes) become red and swollen (inflamed). It is usually caused by a viral infection. But it can also occur because of a bacteria or allergen. Symptoms include a cough that produces yellow or greenish mucus and can last for days or sometimes weeks.  Inside healthy lungs    Air travels in and out of the lungs through the airways. The linings of these airways produce sticky mucus. This mucus traps particles that enter the lungs. Tiny structures called cilia then sweep the particles out of the airways.     Healthy airway: Airways are normally open. Air moves in and out easily.      Healthy cilia: Tiny, hairlike cilia sweep mucus and particles up and out of the airways.     Lungs with bronchitis  Bronchitis often occurs with a cold or the flu virus. The airways become inflamed (red and swollen). There is a deep hacking cough from the extra mucus. Other symptoms may include:    Wheezing    Chest discomfort    Shortness of breath    Mild fever  A second infection, this time due to bacteria, may then occur. And airways irritated by allergens or smoke are more likely to get infected.        Inflamed airway: Inflammation and extra mucus narrow the airway, causing shortness of breath.      Impaired cilia: Extra mucus impairs cilia, causing congestion and wheezing. Smoking makes the problem worse.     Making a diagnosis  A physical exam, health history, and certain tests help your healthcare provider make the  diagnosis.  Health history  Your healthcare provider will ask you about your symptoms.  The exam  Your provider listens to your chest for signs of congestion. He or she may also check your ears, nose, and throat.  Possible tests    A sputum test for bacteria. This requires a sample of mucus from your lungs.    A nasal or throat swab. This tests to see if you have a bacterial infection.    A chest X-ray. This is done if your healthcare provider thinks you have pneumonia.    Tests to check for an underlying condition. Other tests may be done to check for things such as allergies, asthma, or COPD (chronic obstructive pulmonary disease). You may need to see a specialist for more lung function testing.  Treating a cough  The main treatment for bronchitis is easing symptoms. Avoiding smoke, allergens, and other things that trigger coughing can often help. If the infection is bacterial, you may be given antibiotics. During the illness, it's important to get plenty of sleep. To ease symptoms:    Don t smoke. Also avoid secondhand smoke.    Use a humidifier. Or try breathing in steam from a hot shower. This may help loosen mucus.    Drink a lot of water and juice. They can soothe the throat and may help thin mucus.    Sit up or use extra pillows when in bed. This helps to lessen coughing and congestion.    Ask your provider about using medicine. Ask about using cough medicine, pain and fever medicine, or a decongestant.  Antibiotics  Most cases of bronchitis are caused by cold or flu viruses. They don t need antibiotics to treat them, even if your mucus is thick and green or yellow. Antibiotics don t treat viral illness and antibiotics have not been shown to have any benefit in cases of acute bronchitis. Taking antibiotics when they are not needed increases your risk of getting an infection later that is antibiotic-resistant. Antibiotics can also cause severe cases of diarrhea that require other antibiotics to treat.  It is  important that you accept your healthcare provider's opinion to not use antibiotics. Your provider will prescribe antibiotics if the infection is caused by bacteria. If they are prescribed:    Take all of the medicine. Take the medicine until it is used up, even if symptoms have improved. If you don t, the bronchitis may come back.    Take the medicines as directed. For instance, some medicines should be taken with food.    Ask about side effects. Ask your provider or pharmacist what side effects are common, and what to do about them.  Follow-up care  You should see your provider again in 2 to 3 weeks. By this time, symptoms should have improved. An infection that lasts longer may mean you have a more serious problem.  Prevention    Avoid tobacco smoke. If you smoke, quit. Stay away from smoky places. Ask friends and family not to smoke around you, or in your home or car.    Get checked for allergies.    Ask your provider about getting a yearly flu shot. Also ask about pneumococcal or pneumonia shots.    Wash your hands often. This helps reduce the chance of picking up viruses that cause colds and flu.  Call your healthcare provider if:    Symptoms worsen, or you have new symptoms    Breathing problems worsen or  become severe    Symptoms don t get better within a week, or within 3 days of taking antibiotics   Date Last Reviewed: 2/1/2017 2000-2017 [employer]. [address] [place] [address], [place] [id number]. All rights reserved. This information is not intended as a substitute for professional medical care. Always follow your healthcare professional's instructions.          Viral or Bacterial Bronchitis with Wheezing (Adult)    Bronchitis is an infection of the air passages. It often occurs during a cold and is usually caused by a virus. Symptoms include cough with mucus (phlegm) and low-grade fever. This illness is contagious during the first few days and is spread through the air by coughing and  sneezing, or by direct contact (touching the sick person and then touching your own eyes, nose, or mouth).  If there is a lot of inflammation, air flow is restricted. The air passages may also go into spasm, especially if you have asthma. This causes wheezing and difficulty breathing even in people who do not have asthma.  Bronchitis usually lasts 7 to 14 days. The wheezing should improve with treatment during the first week. An inhaler is often prescribed to relax the air passages and stop wheezing. Antibiotics will be prescribed if your doctor thinks there is also a secondary bacterial infection.  Home care    If symptoms are severe, rest at home for the first 2 to 3 days. When you go back to your usual activities, don't let yourself get too tired.    Do not smoke. Also avoid being exposed to secondhand smoke.    You may use over-the-counter medicine to control fever or pain, unless another medicine was prescribed. Note: If you have chronic liver or kidney disease or have ever had a stomach ulcer or gastrointestinal bleeding, talk with your healthcare provider before using these medicines. Also talk to your provider if you are taking medicine to prevent blood clots.) Aspirin should never be given to anyone younger than 18 years of age who is ill with a viral infection or fever. It may cause severe liver or brain damage.    Your appetite may be poor, so a light diet is fine. Avoid dehydration by drinking 6 to 8 glasses of fluids per day (such as water, soft drinks, sports drinks, juices, tea, or soup). Extra fluids will help loosen secretions in the nose and lungs.    Over-the-counter cough, cold, and sore-throat medicines will not shorten the length of the illness, but they may be helpful to reduce symptoms. (Note: Do not use decongestants if you have high blood pressure.)    If you were given an inhaler, use it exactly as directed. If you need to use it more often than prescribed, your condition may be worsening.  If this happens, contact your healthcare provider.    If prescribed, finish all antibiotic medicine, even if you are feeling better after only a few days.  Follow-up care  Follow up with your healthcare provider, or as advised. If you had an X-ray or ECG (electrocardiogram), a specialist will review it. You will be notified of any new findings that may affect your care.  If you are age 65 or older, or if you have a chronic lung disease or condition that affects your immune system, or you smoke, ask your healthcare provider about getting a pneumococcal vaccine and a yearly flu shot (influenza vaccine).  When to seek medical advice  Call your healthcare provider right away if any of these occur:    Fever of 100.4 F (38 C) or higher, or as directed by your healthcare provider    Coughing up increasing amounts of colored sputum    Weakness, drowsiness, headache, facial pain, ear pain, or a stiff neck  Call 911  Call 911 if any of these occur.    Coughing up blood    Worsening weakness, drowsiness, headache, or stiff neck    Increased wheezing not helped with medication, shortness of breath, or pain with breathing  Date Last Reviewed: 9/13/2015 2000-2017 [employer]. [address] [place] [address]. All rights reserved. This information is not intended as a substitute for professional medical care. Always follow your healthcare professional's instructions.          24 Hour Appointment Hotline       To make an appointment at any [facility], call [phone] ([phone]). If you don't have a family doctor or clinic, we will help you find one. [facility] clinics are conveniently located to serve the needs of you and your family.             Review of your medicines      START taking        Dose / Directions Last dose taken    albuterol 108 (90 Base) MCG/ACT inhaler   Commonly known as:  PROAIR HFA/PROVENTIL HFA/VENTOLIN HFA   Dose:  2 puff   Quantity:  1 Inhaler        Inhale 2 puffs  into the lungs every 6 hours as needed for shortness of breath / dyspnea or wheezing   Refills:  0        azithromycin 250 MG tablet   Commonly known as:  ZITHROMAX   Quantity:  6 tablet        Two tablets first day, then one tablet daily for four days.   Refills:  0          Our records show that you are taking the medicines listed below. If these are incorrect, please call your family doctor or clinic.        Dose / Directions Last dose taken    HYDROcodone-acetaminophen 5-325 MG per tablet   Commonly known as:  NORCO   Dose:  1-2 tablet   Quantity:  15 tablet        Take 1-2 tablets by mouth every 4 hours as needed for moderate to severe pain   Refills:  0        IBUPROFEN PO   Dose:  800 mg        Take 800 mg by mouth every 8 hours as needed for moderate pain   Refills:  0        oxyCODONE-acetaminophen 5-325 MG per tablet   Commonly known as:  PERCOCET   Dose:  1-2 tablet   Quantity:  10 tablet        Take 1-2 tablets by mouth every 6 hours as needed for moderate to severe pain   Refills:  0                Prescriptions were sent or printed at these locations (2 Prescriptions)                   [facility] [address] [facility]   [address] [facility] [address]    Telephone:  [phone]   Fax:  [phone]   Hours:                  E-Prescribed (2 of 2)         albuterol (PROAIR HFA/PROVENTIL HFA/VENTOLIN HFA) 108 (90 Base) MCG/ACT inhaler               azithromycin (ZITHROMAX) 250 MG tablet                Orders Needing Specimen Collection     None      Pending Results     No orders found from 10/2/2018 to 10/5/2018.            Pending Culture Results     No orders found from 10/2/2018 to 10/5/2018.            Pending Results Instructions     If you had any lab results that were not finalized at the time of your Discharge, you can call the ED Lab Result RN at [phone]. You will be contacted by this team for any positive Lab results or changes in treatment. The nurses are  "available 7 days a week from 10A to 6:30P.  You can leave a message 24 hours per day and they will return your call.        Test Results From Your Hospital Stay               Thank you for choosing [facility]       Thank you for choosing [facility] for your care. Our goal is always to provide you with excellent care. Hearing back from our patients is one way we can continue to improve our services. Please take a few minutes to complete the written survey that you may receive in the mail after you visit with us. Thank you!        [employer] Information     [employer] lets you send messages to your doctor, view your test results, renew your prescriptions, schedule appointments and more. To sign up, go to [email or web address] . Click on \"Log in\" on the left side of the screen, which will take you to the Welcome page. Then click on \"Sign up Now\" on the right side of the page.     You will be asked to enter the access code listed below, as well as some personal information. Please follow the directions to create your username and password.     Your access code is: [email or web address]  Expires: 2019  9:09 AM     Your access code will  in 90 days. If you need help or a new code, please call your [facility] clinic or [phone].        Care EveryWhere ID     This is your Care EveryWhere ID. This could be used by other organizations to access your [facility] medical records  [id number]        Equal Access to Services     BRIANNA WHITE AH: Hadii tomy hercules Sosupriya, waaxda luqadaha, qaybta kaalmada gracia, reyes keith . So [facility] [phone].    ATENCIÓN: Si habla español, tiene a germain disposición servicios gratuitos de asistencia lingüística. Mansoor al [phone].    We comply with applicable federal civil rights laws and [place] laws. We do not discriminate on the basis of race, color, national origin, age, disability, sex, sexual orientation, or gender identity.            After Visit Summary  "      This is your record. Keep this with you and show to your community pharmacist(s) and doctor(s) at your next visit.

## 2018-10-04 NOTE — ED PROVIDER NOTES
History     Chief Complaint   Patient presents with     Cough     Nasal Congestion     HPI  Maroi Posada is a 33 year old male with 1 + weeks of URI sx that is worsening in the past few days.  He has cough productive of yellow-green sputum and nasal congestion and drainage. He has anterior pleuritic chest tightness  but no true chest pain. No shortness of breath, hemoptysis, leg pain or leg swelling.  No fever or chills.  No history of asthma or COPD.    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 yes         Pneumothorax on left 03/10/2015     Priority: Medium        Past Medical History:    No past medical history on file.    Past Surgical History:    No past surgical history on file.    Family History:    Family History   Problem Relation Age of Onset     Unknown/Adopted Father        Social History:  Marital Status:  Single [1]  Social History   Substance Use Topics     Smoking status: Former Smoker     Packs/day: 0.50     Years: 9.00     Types: Cigarettes     Smokeless tobacco: Never Used     Alcohol use Yes        Medications:      albuterol (PROAIR HFA/PROVENTIL HFA/VENTOLIN HFA) 108 (90 Base) MCG/ACT inhaler   azithromycin (ZITHROMAX) 250 MG tablet   IBUPROFEN PO   HYDROcodone-acetaminophen (NORCO) 5-325 MG per tablet   oxyCODONE-acetaminophen (PERCOCET) 5-325 MG per tablet     He does not smoke.  No history of   No Known Allergies    Review of Systems  As mentioned above in the history present illness.  All other systems were reviewed and are negative.    Physical Exam   BP: (!) 147/114  Heart Rate: 115  Temp: 98.9  F (37.2  C)  Resp: 18  Weight: 59 kg (130 lb)  SpO2: 97 %      Physical Exam   Constitutional: He is oriented to person, place, and  time. He appears well-developed and well-nourished. No distress.   HENT:   Head: Normocephalic and atraumatic.   Mouth/Throat: Oropharynx is clear and moist.   Eyes: Conjunctivae and EOM are normal. No scleral icterus.   Neck: Normal range of motion. Neck supple. No tracheal deviation present.   Cardiovascular: Normal rate, regular rhythm and normal heart sounds.  Exam reveals no gallop and no friction rub.    No murmur heard.  Pulmonary/Chest: Effort normal and breath sounds normal. No stridor. No respiratory distress. He has no wheezes. He has no rales.   Musculoskeletal: Normal range of motion. He exhibits no edema or tenderness.   Neurological: He is alert and oriented to person, place, and time.   Skin: Skin is warm and dry. No rash noted. He is not diaphoretic. No erythema. No pallor.   Psychiatric: He has a normal mood and affect. His behavior is normal.   Nursing note and vitals reviewed.      ED Course     ED Course     Procedures                 No results found for this or any previous visit (from the past 24 hour(s)).    Medications - No data to display    Assessments & Plan (with Medical Decision Making)   Over 1 week of URI symptoms with worsening in the past several days.  No respiratory distress or hypoxia.  Mildly prolonged expiratory phase without wheezing.  Will Rx azithromycin and an albuterol inhaler.  He was provided instructions for supportive care and will return as needed for worsened condition or worsening symptoms, or new problems or concerns. Primary care clinic recheck if not improving over the next several days.      I have reviewed the nursing notes.    I have reviewed the findings, diagnosis, plan and need for follow up with the patient.    Discharge Medication List as of 10/4/2018  9:09 AM      START taking these medications    Details   albuterol (PROAIR HFA/PROVENTIL HFA/VENTOLIN HFA) 108 (90 Base) MCG/ACT inhaler Inhale 2 puffs into the lungs every 6 hours as needed for shortness  of breath / dyspnea or wheezing, Disp-1 Inhaler, R-0, E-Prescribe      azithromycin (ZITHROMAX) 250 MG tablet Two tablets first day, then one tablet daily for four days., Disp-6 tablet, R-0, E-Prescribe             Final diagnoses:   Acute bronchitis with bronchospasm       10/4/2018   [facility] EMERGENCY DEPARTMENT     Justin Newman MD  10/07/18 0951       Justin Newman MD  10/10/18 8671

## 2018-10-11 ENCOUNTER — HOSPITAL ENCOUNTER (EMERGENCY)
Facility: CLINIC | Age: 33
Discharge: HOME | End: 2018-10-11
Attending: FAMILY MEDICINE | Admitting: FAMILY MEDICINE

## 2018-10-11 VITALS
TEMPERATURE: 98.2 F | SYSTOLIC BLOOD PRESSURE: 134 MMHG | OXYGEN SATURATION: 97 % | DIASTOLIC BLOOD PRESSURE: 77 MMHG | RESPIRATION RATE: 20 BRPM

## 2018-10-11 DIAGNOSIS — J20.9 ACUTE BRONCHITIS, UNSPECIFIED ORGANISM: ICD-10-CM

## 2018-10-11 DIAGNOSIS — J20.9 BRONCHOSPASM WITH BRONCHITIS, ACUTE: ICD-10-CM

## 2018-10-11 PROCEDURE — 99283 EMERGENCY DEPT VISIT LOW MDM: CPT | Performed by: FAMILY MEDICINE

## 2018-10-11 PROCEDURE — 25000125 ZZHC RX 250: Performed by: FAMILY MEDICINE

## 2018-10-11 PROCEDURE — 99284 EMERGENCY DEPT VISIT MOD MDM: CPT | Mod: Z6 | Performed by: FAMILY MEDICINE

## 2018-10-11 RX ORDER — PREDNISONE 20 MG/1
40 TABLET ORAL DAILY
Qty: 10 TABLET | Refills: 0 | Status: SHIPPED | OUTPATIENT
Start: 2018-10-11 | End: 2019-03-01

## 2018-10-11 RX ORDER — PREDNISONE 20 MG/1
20 TABLET ORAL ONCE
Status: COMPLETED | OUTPATIENT
Start: 2018-10-11 | End: 2018-10-11

## 2018-10-11 RX ADMIN — PREDNISONE 20 MG: 20 TABLET ORAL at 22:51

## 2018-10-11 NOTE — ED AVS SNAPSHOT
[facility] Emergency Department    [address] [facility] [address]    Phone:  [phone]    Fax:  [phone]                                       Mario Posada   MRN: [id number]    Department:  [facility] Emergency Department   Date of Visit:  10/11/2018           After Visit Summary Signature Page     I have received my discharge instructions, and my questions have been answered. I have discussed any challenges I see with this plan with the nurse or doctor.    ..........................................................................................................................................  Patient/Patient Representative Signature      ..........................................................................................................................................  Patient Representative Print Name and Relationship to Patient    ..................................................               ................................................  Date                                   Time    ..........................................................................................................................................  Reviewed by Signature/Title    ...................................................              ..............................................  Date                                               Time          22EPIC Rev 08/18

## 2018-10-11 NOTE — LETTER
October 11, 2018      To Whom It May Concern:      Mario Posada was seen in our Emergency Department today, 10/11/18.  Please excuse him from work October 12, 2018, due to an acute medical problem.    Sincerely,        Duran Sosa MD

## 2018-10-11 NOTE — ED AVS SNAPSHOT
[facility]    [address] [facility] [place] [address]    Phone:  [phone]    Fax:  [phone]                                       Mario Posada   MRN: [id number]    Department:  [facility]   Date of Visit:  10/11/2018           Patient Information     Date Of Birth          1985        Your diagnoses for this visit were:     Acute bronchitis, unspecified organism     Bronchospasm with bronchitis, acute        You were seen by Duran Sosa MD.        Discharge Instructions       Take prednisone 20 mg, 2 pills in the morning with food for 5 days.  Continue your albuterol inhaler 2 puffs up to every 4 hours if needed for shortness of breath or wheezing.    Viral or Bacterial Bronchitis with Wheezing (Adult)    Bronchitis is an infection of the air passages. It often occurs during a cold and is usually caused by a virus. Symptoms include cough with mucus (phlegm) and low-grade fever. This illness is contagious during the first few days and is spread through the air by coughing and sneezing, or by direct contact (touching the sick person and then touching your own eyes, nose, or mouth).  If there is a lot of inflammation, air flow is restricted. The air passages may also go into spasm, especially if you have asthma. This causes wheezing and difficulty breathing even in people who do not have asthma.  Bronchitis usually lasts 7 to 14 days. The wheezing should improve with treatment during the first week. An inhaler is often prescribed to relax the air passages and stop wheezing. Antibiotics will be prescribed if your doctor thinks there is also a secondary bacterial infection.  Home care    If symptoms are severe, rest at home for the first 2 to 3 days. When you go back to your usual activities, don't let yourself get too tired.    Do not smoke. Also avoid being exposed to secondhand smoke.    You may use over-the-counter medicine to control fever or  pain, unless another medicine was prescribed. Note: If you have chronic liver or kidney disease or have ever had a stomach ulcer or gastrointestinal bleeding, talk with your healthcare provider before using these medicines. Also talk to your provider if you are taking medicine to prevent blood clots.) Aspirin should never be given to anyone younger than 18 years of age who is ill with a viral infection or fever. It may cause severe liver or brain damage.    Your appetite may be poor, so a light diet is fine. Avoid dehydration by drinking 6 to 8 glasses of fluids per day (such as water, soft drinks, sports drinks, juices, tea, or soup). Extra fluids will help loosen secretions in the nose and lungs.    Over-the-counter cough, cold, and sore-throat medicines will not shorten the length of the illness, but they may be helpful to reduce symptoms. (Note: Do not use decongestants if you have high blood pressure.)    If you were given an inhaler, use it exactly as directed. If you need to use it more often than prescribed, your condition may be worsening. If this happens, contact your healthcare provider.    If prescribed, finish all antibiotic medicine, even if you are feeling better after only a few days.  Follow-up care  Follow up with your healthcare provider, or as advised. If you had an X-ray or ECG (electrocardiogram), a specialist will review it. You will be notified of any new findings that may affect your care.  If you are age 65 or older, or if you have a chronic lung disease or condition that affects your immune system, or you smoke, ask your healthcare provider about getting a pneumococcal vaccine and a yearly flu shot (influenza vaccine).  When to seek medical advice  Call your healthcare provider right away if any of these occur:    Fever of 100.4 F (38 C) or higher, or as directed by your healthcare provider    Coughing up increasing amounts of colored sputum    Weakness, drowsiness, headache, facial pain,  ear pain, or a stiff neck  Call 911  Call 911 if any of these occur.    Coughing up blood    Worsening weakness, drowsiness, headache, or stiff neck    Increased wheezing not helped with medication, shortness of breath, or pain with breathing  Date Last Reviewed: 9/13/2015 2000-2017 [employer]. [address] [place] [address]. All rights reserved. This information is not intended as a substitute for professional medical care. Always follow your healthcare professional's instructions.          24 Hour Appointment Hotline       To make an appointment at any [facility], call [phone] ([phone]). If you don't have a family doctor or clinic, we will help you find one. [facility] clinics are conveniently located to serve the needs of you and your family.             Review of your medicines      START taking        Dose / Directions Last dose taken    predniSONE 20 MG tablet   Commonly known as:  DELTASONE   Dose:  40 mg   Quantity:  10 tablet        Take 2 tablets (40 mg) by mouth daily for 5 days   Refills:  0          Our records show that you are taking the medicines listed below. If these are incorrect, please call your family doctor or clinic.        Dose / Directions Last dose taken    albuterol 108 (90 Base) MCG/ACT inhaler   Commonly known as:  PROAIR HFA/PROVENTIL HFA/VENTOLIN HFA   Dose:  2 puff   Quantity:  1 Inhaler        Inhale 2 puffs into the lungs every 6 hours as needed for shortness of breath / dyspnea or wheezing   Refills:  0        azithromycin 250 MG tablet   Commonly known as:  ZITHROMAX   Quantity:  6 tablet        Two tablets first day, then one tablet daily for four days.   Refills:  0        HYDROcodone-acetaminophen 5-325 MG per tablet   Commonly known as:  NORCO   Dose:  1-2 tablet   Quantity:  15 tablet        Take 1-2 tablets by mouth every 4 hours as needed for moderate to severe pain   Refills:  0        IBUPROFEN PO   Dose:  800 mg        Take  "800 mg by mouth every 8 hours as needed for moderate pain   Refills:  0        oxyCODONE-acetaminophen 5-325 MG per tablet   Commonly known as:  PERCOCET   Dose:  1-2 tablet   Quantity:  10 tablet        Take 1-2 tablets by mouth every 6 hours as needed for moderate to severe pain   Refills:  0                Prescriptions were sent or printed at these locations (1 Prescription)                   [facility] [place], [place] - [address] [facility]   [address] [facility] [address]    Telephone:  [phone]   Fax:  [phone]   Hours:                  E-Prescribed (1 of 1)         predniSONE (DELTASONE) 20 MG tablet                Orders Needing Specimen Collection     None      Pending Results     No orders found from 10/9/2018 to 10/12/2018.            Pending Culture Results     No orders found from 10/9/2018 to 10/12/2018.            Pending Results Instructions     If you had any lab results that were not finalized at the time of your Discharge, you can call the ED Lab Result RN at [phone]. You will be contacted by this team for any positive Lab results or changes in treatment. The nurses are available 7 days a week from 10A to 6:30P.  You can leave a message 24 hours per day and they will return your call.        Test Results From Your Hospital Stay               Thank you for choosing [facility]       Thank you for choosing [facility] for your care. Our goal is always to provide you with excellent care. Hearing back from our patients is one way we can continue to improve our services. Please take a few minutes to complete the written survey that you may receive in the mail after you visit with us. Thank you!        [employer] Information     [employer] lets you send messages to your doctor, view your test results, renew your prescriptions, schedule appointments and more. To sign up, go to [email or web address] . Click on \"Log in\" on the left side of the screen, which will take you to the " "Welcome page. Then click on \"Sign up Now\" on the right side of the page.     You will be asked to enter the access code listed below, as well as some personal information. Please follow the directions to create your username and password.     Your access code is: [email or web address]  Expires: 2019  9:09 AM     Your access code will  in 90 days. If you need help or a new code, please call your [facility] clinic or [phone].        Care EveryWhere ID     This is your Care EveryWhere ID. This could be used by other organizations to access your [facility] medical records  [id number]        Equal Access to Services     BRIANNA WHITE : Neptali Ponce, kendrick monroy, shirley fagan, reyes johnson. So [facility] [phone].    ATENCIÓN: Si habla español, tiene a germain disposición servicios gratuitos de asistencia lingüística. Llame al [phone].    We comply with applicable federal civil rights laws and [place] laws. We do not discriminate on the basis of race, color, national origin, age, disability, sex, sexual orientation, or gender identity.            After Visit Summary       This is your record. Keep this with you and show to your community pharmacist(s) and doctor(s) at your next visit.                  "

## 2018-10-12 NOTE — ED PROVIDER NOTES
"  History     Chief Complaint   Patient presents with     Cough     FOR 2 WEEKS, WAS ON ZPACK, USING INHALER     HPI    Mario Posada is a 33 year old male who presents with cough.  He was seen in October for by Dr. Casanova with a diagnosis of bronchitis and treated with azithromycin and albuterol inhaler.  He returns because he is still coughing and it is keeping him up at night.  He has some wheezing.  He does not smoke having quit 3 years ago.  He is not having fevers.  He feels \"tight\" in the chest.  He has not been diagnosed with asthma in the past.    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 yes         Pneumothorax on left 03/10/2015     Priority: Medium        Past Medical History:    No past medical history on file.    Past Surgical History:    No past surgical history on file.    Family History:    Family History   Problem Relation Age of Onset     Unknown/Adopted Father        Social History:  Marital Status:  Single [1]  Social History   Substance Use Topics     Smoking status: Former Smoker     Packs/day: 0.50     Years: 9.00     Types: Cigarettes     Smokeless tobacco: Never Used     Alcohol use Yes        Medications:      predniSONE (DELTASONE) 20 MG tablet   albuterol (PROAIR HFA/PROVENTIL HFA/VENTOLIN HFA) 108 (90 Base) MCG/ACT inhaler   azithromycin (ZITHROMAX) 250 MG tablet   HYDROcodone-acetaminophen (NORCO) 5-325 MG per tablet   IBUPROFEN PO   oxyCODONE-acetaminophen (PERCOCET) 5-325 MG per tablet         Review of Systems  Further problem focused system review negative.    Physical Exam   BP: 134/77  Heart Rate: 110  Temp: 98.2  F (36.8  C)  Resp: 20  SpO2: 97 %      Physical Exam  Nursing note and vitals were reviewed.  Constitutional: " Awake and alert, adequately nourished and developed appearing 33-year-old in no apparent discomfort, who has a dry sounding cough.  He appears mildly ill but nontoxic.  HEENT: EACs clear.  TMs normal.  PERRLA.  EOMI.   Neck: Freely mobile.  Cardiovascular: Cardiac examination reveals normal heart rate and regular rhythm without murmur.  Pulmonary/Chest: Breathing is unlabored.  Breath sounds are clear but he has wheezing and expiratory prolongation on forced expiration.  No rales or rhonchi.  Musculoskeletal: Extremities are warm and well-perfused and without edema  Neurological: Alert, oriented, thought content logical, coherent   Skin: Warm, dry, no rashes.  Psychiatric: Affect broad and appropriate.      ED Course     ED Course     Procedures               Critical Care time:  none               No results found for this or any previous visit (from the past 24 hour(s)).    Medications   predniSONE (DELTASONE) tablet 20 mg (not administered)       Assessments & Plan (with Medical Decision Making)     33-year-old presents with 10 days of cough.  He has some wheezing on forced expiration and feels tight in the chest.  He has had some improvement with use of albuterol but continues coughing persistently.  His exam is consistent with some bronchospasm associated with his bronchitis.  Recommended addition of prednisone to his albuterol inhaler.  Return if not improved in 24-48 hours or new concerning symptoms develop.    I have reviewed the nursing notes.    I have reviewed the findings, diagnosis, plan and need for follow up with the patient.       New Prescriptions    PREDNISONE (DELTASONE) 20 MG TABLET    Take 2 tablets (40 mg) by mouth daily for 5 days       Final diagnoses:   Acute bronchitis, unspecified organism   Bronchospasm with bronchitis, acute       10/11/2018   [facility] EMERGENCY DEPARTMENT     Duran Sosa MD  10/11/18 0265

## 2018-10-27 ENCOUNTER — APPOINTMENT (OUTPATIENT)
Dept: GENERAL RADIOLOGY | Facility: CLINIC | Age: 33
End: 2018-10-27
Attending: EMERGENCY MEDICINE

## 2018-10-27 ENCOUNTER — HOSPITAL ENCOUNTER (EMERGENCY)
Facility: CLINIC | Age: 33
Discharge: HOME | End: 2018-10-27
Attending: EMERGENCY MEDICINE | Admitting: EMERGENCY MEDICINE

## 2018-10-27 VITALS
HEIGHT: 71 IN | WEIGHT: 133 LBS | TEMPERATURE: 98.8 F | RESPIRATION RATE: 17 BRPM | DIASTOLIC BLOOD PRESSURE: 91 MMHG | SYSTOLIC BLOOD PRESSURE: 136 MMHG | BODY MASS INDEX: 18.62 KG/M2 | OXYGEN SATURATION: 95 %

## 2018-10-27 DIAGNOSIS — J06.9 VIRAL URI WITH COUGH: ICD-10-CM

## 2018-10-27 DIAGNOSIS — J20.9 ACUTE BRONCHITIS, UNSPECIFIED ORGANISM: ICD-10-CM

## 2018-10-27 PROCEDURE — 99284 EMERGENCY DEPT VISIT MOD MDM: CPT | Mod: Z6 | Performed by: EMERGENCY MEDICINE

## 2018-10-27 PROCEDURE — 40000275 ZZH STATISTIC RCP TIME EA 10 MIN

## 2018-10-27 PROCEDURE — 94640 AIRWAY INHALATION TREATMENT: CPT

## 2018-10-27 PROCEDURE — 99283 EMERGENCY DEPT VISIT LOW MDM: CPT | Mod: 25 | Performed by: EMERGENCY MEDICINE

## 2018-10-27 PROCEDURE — 71046 X-RAY EXAM CHEST 2 VIEWS: CPT

## 2018-10-27 PROCEDURE — 94640 AIRWAY INHALATION TREATMENT: CPT | Performed by: EMERGENCY MEDICINE

## 2018-10-27 PROCEDURE — 25000125 ZZHC RX 250: Performed by: EMERGENCY MEDICINE

## 2018-10-27 RX ORDER — IPRATROPIUM BROMIDE AND ALBUTEROL SULFATE 2.5; .5 MG/3ML; MG/3ML
3 SOLUTION RESPIRATORY (INHALATION) ONCE
Status: COMPLETED | OUTPATIENT
Start: 2018-10-27 | End: 2018-10-27

## 2018-10-27 RX ORDER — PREDNISONE 20 MG/1
40 TABLET ORAL ONCE
Status: COMPLETED | OUTPATIENT
Start: 2018-10-27 | End: 2018-10-27

## 2018-10-27 RX ORDER — INHALER, ASSIST DEVICES
1 SPACER (EA) MISCELLANEOUS ONCE
Status: DISCONTINUED | OUTPATIENT
Start: 2018-10-27 | End: 2018-10-28 | Stop reason: HOSPADM

## 2018-10-27 RX ORDER — PREDNISONE 20 MG/1
TABLET ORAL
Qty: 10 TABLET | Refills: 0 | Status: SHIPPED | OUTPATIENT
Start: 2018-10-27 | End: 2018-11-02

## 2018-10-27 RX ORDER — ALBUTEROL SULFATE 90 UG/1
2 AEROSOL, METERED RESPIRATORY (INHALATION) EVERY 6 HOURS PRN
Qty: 1 INHALER | Refills: 0 | Status: SHIPPED | OUTPATIENT
Start: 2018-10-27 | End: 2020-01-06

## 2018-10-27 RX ADMIN — PREDNISONE 40 MG: 20 TABLET ORAL at 21:25

## 2018-10-27 RX ADMIN — IPRATROPIUM BROMIDE AND ALBUTEROL SULFATE 3 ML: .5; 3 SOLUTION RESPIRATORY (INHALATION) at 21:28

## 2018-10-27 ASSESSMENT — ENCOUNTER SYMPTOMS
ABDOMINAL PAIN: 0
NAUSEA: 0
TROUBLE SWALLOWING: 0
FEVER: 1
WHEEZING: 1
RHINORRHEA: 1
FATIGUE: 1
VOICE CHANGE: 0
WOUND: 0
HEADACHES: 1
CHILLS: 0
SHORTNESS OF BREATH: 0
COUGH: 1
APPETITE CHANGE: 0
LIGHT-HEADEDNESS: 0
SORE THROAT: 0
BACK PAIN: 0

## 2018-10-27 NOTE — ED AVS SNAPSHOT
[facility]    [address] [facility] [address]    Phone:  [phone]    Fax:  [phone]                                       Mario Posada   MRN: [id number]    Department:  [facility]   Date of Visit:  10/27/2018           Patient Information     Date Of Birth          1985        Your diagnoses for this visit were:     Viral URI with cough     Acute bronchitis, unspecified organism        You were seen by Musa Griffin MD.      Follow-up Information     Follow up with [facility]. Schedule an appointment as soon as possible for a visit in 1 week.    Specialty:  Family Practice    Why:  For follow up    Contact information:    [address] [place] [address] [phone]    Additional information:    Please check-in at the Family Practice    Clinic on the main level (Clinic A).       The medical center is located at   [address] [place] [address]. (between [facility] and   [place] [address] in [place], [place] north   of [place]).        Discharge Instructions         Viral Bronchitis (Adult)    You have a viral bronchitis. Bronchitis is inflammation and swelling of the lining of the lungs. This is often caused by an infection. Symptoms include a dry, hacking cough that is worse at night. The cough may bring up yellow-green mucus. You may also feel short of breath or wheeze. Other symptoms may include tiredness, chest discomfort, and chills.  Bronchitis that is caused by a virus is not treated with antibiotics. Instead, medicines may be given to help relieve symptoms. Symptoms can last up to 2 weeks, although the cough may last much longer.  This illness is contagious during the first few days and is spread through the air by coughing and sneezing, or by direct contact (touching the sick person and then touching your own eyes, nose, or mouth).  Most viral illnesses resolve within 10 to 14 days with rest and simple  home remedies, although they may sometimes last for several weeks.  Home care    If symptoms are severe, rest at home for the first 2 to 3 days. When you go back to your usual activities, don't let yourself get too tired.    Do not smoke. Also avoid being exposed to secondhand smoke.    You may use over-the-counter medicine to control fever or pain, unless another pain medicine was prescribed. If you have chronic liver or kidney disease or have ever had a stomach ulcer or gastrointestinal bleeding, talk with your healthcare provider before using these medicines. Also talk to your provider if you are taking medicine to prevent blood clots. Aspirin should never be given to anyone younger than 18 years of age who is ill with a viral infection or fever. It may cause severe liver or brain damage.    Your appetite may be poor, so a light diet is fine. Avoid dehydration by drinking 6 to 8 glasses of fluids per day (such as water, soft drinks, sports drinks, juices, tea, or soup). Extra fluids will help loosen secretions in the nose and lungs.    Over-the-counter cough, cold, and sore-throat medicines will not shorten the length of the illness, but they may help to reduce symptoms. Don't use decongestants if you have high blood pressure.  Follow-up care  Follow up with your healthcare provider, or as advised. If you had an X-ray or ECG (electrocardiogram), a specialist will review it. You will be notified of any new findings that may affect your care.  If you are age 65 or older, or if you have a chronic lung disease or condition that affects your immune system, or you smoke, ask your healthcare provider about getting a pneumococcal vaccine and a yearly flu shot (influenza vaccine).  When to seek medical advice  Call your healthcare provider right away if any of these occur:    Fever of 100.4 F (38 C) or higher, or as directed by your healthcare provider    Coughing up increased amounts of colored sputum    Weakness,  drowsiness, headache, facial pain, ear pain, or a stiff neck  Call 911  Call 911 if any of these occur:    Coughing up blood    Worsening weakness, drowsiness, headache, or stiff neck    Trouble breathing, wheezing, or pain with breathing  Date Last Reviewed: 9/13/2015 2000-2017 [employer]. [address] [place] [address]. All rights reserved. This information is not intended as a substitute for professional medical care. Always follow your healthcare professional's instructions.          24 Hour Appointment Hotline       To make an appointment at any [facility], call [phone] ([phone]). If you don't have a family doctor or clinic, we will help you find one. [facility] clinics are conveniently located to serve the needs of you and your family.             Review of your medicines      START taking        Dose / Directions Last dose taken    predniSONE 20 MG tablet   Commonly known as:  DELTASONE   Quantity:  10 tablet        Take two tablets (= 40mg) each day for 5 (five) days   Refills:  0          Our records show that you are taking the medicines listed below. If these are incorrect, please call your family doctor or clinic.        Dose / Directions Last dose taken    albuterol 108 (90 Base) MCG/ACT inhaler   Commonly known as:  PROAIR HFA/PROVENTIL HFA/VENTOLIN HFA   Dose:  2 puff   Quantity:  1 Inhaler        Inhale 2 puffs into the lungs every 6 hours as needed for shortness of breath / dyspnea or wheezing   Refills:  0        azithromycin 250 MG tablet   Commonly known as:  ZITHROMAX   Quantity:  6 tablet        Two tablets first day, then one tablet daily for four days.   Refills:  0        HYDROcodone-acetaminophen 5-325 MG per tablet   Commonly known as:  NORCO   Dose:  1-2 tablet   Quantity:  15 tablet        Take 1-2 tablets by mouth every 4 hours as needed for moderate to severe pain   Refills:  0        IBUPROFEN PO   Dose:  800 mg        Take 800 mg by mouth  every 8 hours as needed for moderate pain   Refills:  0        oxyCODONE-acetaminophen 5-325 MG per tablet   Commonly known as:  PERCOCET   Dose:  1-2 tablet   Quantity:  10 tablet        Take 1-2 tablets by mouth every 6 hours as needed for moderate to severe pain   Refills:  0                Prescriptions were sent or printed at these locations (2 Prescriptions)                   [facility] - [place], [place] - [address] [facility]   [address] [place], [place] [address]    Telephone:  [phone]   Fax:  [phone]   Hours:                  E-Prescribed (2 of 2)         albuterol (PROAIR HFA/PROVENTIL HFA/VENTOLIN HFA) 108 (90 Base) MCG/ACT inhaler               predniSONE (DELTASONE) 20 MG tablet                Procedures and tests performed during your visit     Chest XR,  PA & LAT      Orders Needing Specimen Collection     None      Pending Results     Date and Time Order Name Status Description    10/27/2018 2117 Chest XR,  PA & LAT Preliminary             Pending Culture Results     No orders found from 10/25/2018 to 10/28/2018.            Pending Results Instructions     If you had any lab results that were not finalized at the time of your Discharge, you can call the ED Lab Result RN at [phone]. You will be contacted by this team for any positive Lab results or changes in treatment. The nurses are available 7 days a week from 10A to 6:30P.  You can leave a message 24 hours per day and they will return your call.        Test Results From Your Hospital Stay        10/27/2018 10:08 PM      Narrative     CHEST TWO VIEWS  10/27/2018 9:58 PM     COMPARISON: Two-view chest x-ray 2/1/2018.    HISTORY: Productive cough, fever.    FINDINGS: The cardiac silhouette, pulmonary vasculature, lungs and  pleural spaces are within normal limits.        Impression     IMPRESSION: Clear lungs.                Thank you for choosing [facility]       Thank you for choosing [facility] for your care. Our goal is always  "to provide you with excellent care. Hearing back from our patients is one way we can continue to improve our services. Please take a few minutes to complete the written survey that you may receive in the mail after you visit with us. Thank you!        [employer] Information     [employer] lets you send messages to your doctor, view your test results, renew your prescriptions, schedule appointments and more. To sign up, go to [email or web address] . Click on \"Log in\" on the left side of the screen, which will take you to the Welcome page. Then click on \"Sign up Now\" on the right side of the page.     You will be asked to enter the access code listed below, as well as some personal information. Please follow the directions to create your username and password.     Your access code is: [email or web address]  Expires: 2019  9:09 AM     Your access code will  in 90 days. If you need help or a new code, please call your [facility] clinic or [phone].        Care EveryWhere ID     This is your Care EveryWhere ID. This could be used by other organizations to access your [facility] medical records  [id number]        Equal Access to Services     BRIANNA WHITE : Hadii tomy Ponce, waadriannada eliana, qaskinny kaalmagenevieve fagan, reyes johnson. So [facility] [phone].    ATENCIÓN: Si habla español, tiene a germain disposición servicios gratuitos de asistencia lingüística. Mansoor al [phone].    We comply with applicable federal civil rights laws and [place] laws. We do not discriminate on the basis of race, color, national origin, age, disability, sex, sexual orientation, or gender identity.            After Visit Summary       This is your record. Keep this with you and show to your community pharmacist(s) and doctor(s) at your next visit.                  "

## 2018-10-27 NOTE — ED AVS SNAPSHOT
[facility]    [address] [facility] [address]    Phone:  [phone]    Fax:  [phone]                                       Mario Posada   MRN: [id number]    Department:  [facility]   Date of Visit:  10/27/2018           After Visit Summary Signature Page     I have received my discharge instructions, and my questions have been answered. I have discussed any challenges I see with this plan with the nurse or doctor.    ..........................................................................................................................................  Patient/Patient Representative Signature      ..........................................................................................................................................  Patient Representative Print Name and Relationship to Patient    ..................................................               ................................................  Date                                   Time    ..........................................................................................................................................  Reviewed by Signature/Title    ...................................................              ..............................................  Date                                               Time          22EPIC Rev 08/18

## 2018-10-28 NOTE — ED PROVIDER NOTES
History     Chief Complaint   Patient presents with     Cough     has been seen for this x 2 in the past month, did get better and now cough is worse and is out of INH     HPI  Mario Posada is a 33 year old male with history of pneumothorax on left and acute, alcohol-induced pancreatitis who presents to the ED with a cough. The patient has been seen in the ED twice this past month for acute bronchitis. On 10/4/2018 the patient reported a productive cough of yellow-green sputum, nasal congestion, drainage, and anterior pleuritic chest tightness. He was treated with azithromycin and an albuterol inhaler. On 10/11/2018, the patient was seen in the emergency department again because he was still coughing, which was keeping him up at night. The patient also reported wheezing and chest tightness during this visit. His exam was consistent with some bronchospasm associated with his bronchitis. The patient was recommended an addition of prednisone 40 mg to his albuterol inhaler.     Today the patient reports his cough had worsened and he is out of isoniazid last night. The patient reports the the prednisone helped initially. He was given 4-5 days worth and felt relief for a week after finishing the prednisone treatment. The patient reports he had a minimal non-productive cough during this time, but was no longer wheezing in the morning. He reports his improvement began to reverse three days ago. He reports the onset of wheezing in the morning and a productive cough with green sputum in the morning, but clear sputum by the afternoon, shortness of breath, and a fever of 101.4. The patient reports his chest is sore from coughing, but he has no direct chest pain. He reports rhinorrhea and congestion. He denies ear pain. The patient reports he took ibuprofen this morning and mucinex pm at 5:00pm with no relief. The patient reports he just updated he insurance, so he can receive a primary care provider. The patient quit smoking  3.5 years ago after suffering from a pneumothorax of the left lung.     Patient Active Problem List   Diagnosis     Pneumothorax on left     Acute pancreatitis     Alcohol-induced pancreatitis     Alcoholic pancreatitis     Current Outpatient Prescriptions   Medication Sig Dispense Refill     albuterol (PROAIR HFA/PROVENTIL HFA/VENTOLIN HFA) 108 (90 Base) MCG/ACT inhaler Inhale 2 puffs into the lungs every 6 hours as needed for shortness of breath / dyspnea or wheezing 1 Inhaler 0     predniSONE (DELTASONE) 20 MG tablet Take two tablets (= 40mg) each day for 5 (five) days 10 tablet 0     azithromycin (ZITHROMAX) 250 MG tablet Two tablets first day, then one tablet daily for four days. 6 tablet 0     HYDROcodone-acetaminophen (NORCO) 5-325 MG per tablet Take 1-2 tablets by mouth every 4 hours as needed for moderate to severe pain 15 tablet 0     IBUPROFEN PO Take 800 mg by mouth every 8 hours as needed for moderate pain       oxyCODONE-acetaminophen (PERCOCET) 5-325 MG per tablet Take 1-2 tablets by mouth every 6 hours as needed for moderate to severe pain (Patient not taking: Reported on 4/7/2017) 10 tablet 0     [DISCONTINUED] albuterol (PROAIR HFA/PROVENTIL HFA/VENTOLIN HFA) 108 (90 Base) MCG/ACT inhaler Inhale 2 puffs into the lungs every 6 hours as needed for shortness of breath / dyspnea or wheezing 1 Inhaler 0     No Known Allergies    Problem List:    Patient Active Problem List    Diagnosis Date Noted     Alcoholic pancreatitis 02/18/2016     Priority: Medium     Acute pancreatitis 04/17/2015     Priority: Medium     Alcohol-induced pancreatitis 04/17/2015     Priority: Medium     Do you wish to do the replacement in the background? yes         Pneumothorax on left 03/10/2015     Priority: Medium        Past Medical History:    No past medical history on file.    Past Surgical History:    No past surgical history on file.    Family History:    Family History   Problem Relation Age of Onset      "Unknown/Adopted Father        Social History:  Marital Status:  Single [1]  Social History   Substance Use Topics     Smoking status: Former Smoker     Packs/day: 0.50     Years: 9.00     Types: Cigarettes     Smokeless tobacco: Never Used     Alcohol use Yes        Medications:      albuterol (PROAIR HFA/PROVENTIL HFA/VENTOLIN HFA) 108 (90 Base) MCG/ACT inhaler   predniSONE (DELTASONE) 20 MG tablet   azithromycin (ZITHROMAX) 250 MG tablet   HYDROcodone-acetaminophen (NORCO) 5-325 MG per tablet   IBUPROFEN PO   oxyCODONE-acetaminophen (PERCOCET) 5-325 MG per tablet   [DISCONTINUED] albuterol (PROAIR HFA/PROVENTIL HFA/VENTOLIN HFA) 108 (90 Base) MCG/ACT inhaler         Review of Systems   Constitutional: Positive for fatigue and fever. Negative for appetite change and chills.   HENT: Positive for congestion and rhinorrhea. Negative for sore throat, trouble swallowing and voice change.    Respiratory: Positive for cough and wheezing. Negative for shortness of breath.    Cardiovascular: Negative for chest pain.   Gastrointestinal: Negative for abdominal pain and nausea.   Musculoskeletal: Negative for back pain.   Skin: Negative for rash and wound.   Neurological: Positive for headaches. Negative for light-headedness.   All other systems reviewed and are negative.      Physical Exam   BP: (!) 136/91  Heart Rate: 100  Temp: 98.8  F (37.1  C)  Resp: 17  Height: 180.3 cm (5' 11\")  Weight: 60.3 kg (133 lb)  SpO2: 94 %      Physical Exam   Constitutional: He is oriented to person, place, and time. He appears well-developed and well-nourished.   HENT:   Head: Normocephalic.   Cardiovascular: Regular rhythm.  Tachycardia present.    Borderline tachy   Pulmonary/Chest: Effort normal. He has wheezes (mild inspiratory and end-expiratory). He exhibits no tenderness.   Abdominal: Soft. There is no tenderness.   Musculoskeletal: Normal range of motion. He exhibits no edema.   Neurological: He is alert and oriented to person, place, " and time.   Skin: Skin is warm and dry.   Nursing note and vitals reviewed.      ED Course     ED Course     Procedures               Critical Care time:  none               Results for orders placed or performed during the hospital encounter of 10/27/18 (from the past 24 hour(s))   Chest XR,  PA & LAT    Narrative    CHEST TWO VIEWS  10/27/2018 9:58 PM     COMPARISON: Two-view chest x-ray 2/1/2018.    HISTORY: Productive cough, fever.    FINDINGS: The cardiac silhouette, pulmonary vasculature, lungs and  pleural spaces are within normal limits.      Impression    IMPRESSION: Clear lungs.       Medications   aerochamber with mouthpiece (NO mask) - > 5 years 1 each (not administered)   ipratropium - albuterol 0.5 mg/2.5 mg/3 mL (DUONEB) neb solution 3 mL (3 mLs Nebulization Given 10/27/18 2128)   predniSONE (DELTASONE) tablet 40 mg (40 mg Oral Given 10/27/18 2125)        9:17 PM Patient Assessed.    10:08 PM: I personally reviewed the x-ray.  No evidence of infiltrate.  There is some mild hyperinflation but otherwise normal.  Formal read pending    10:19 PM: PT re-assessed. Feeling better after neb.    Assessments & Plan (with Medical Decision Making)  33-year-old male with a former history of smoking presenting for evaluation of cough and chest tightness.  Patient seen previously and treated for bronchitis with improved.  Over the past 3 days has had recurrent symptoms of cough with chest tightness acute appendicitis and fever.  He does report a productive cough however lungs are clear on x-ray with no evidence of infiltrate.  Treated with a DuoNeb and prednisone in the ED with improvement of his chest tightness and wheezing.  Recommended continued treatment for bronchitis with albuterol, prednisone, and rest.  Recommend following up with primary care for repeat evaluation and possible pulmonary function testing     I have reviewed the nursing notes.    I have reviewed the findings, diagnosis, plan and need for  follow up with the patient.       Discharge Medication List as of 10/27/2018 10:35 PM      START taking these medications    Details   predniSONE (DELTASONE) 20 MG tablet Take two tablets (= 40mg) each day for 5 (five) days, Disp-10 tablet, R-0, E-Prescribe             Final diagnoses:   Viral URI with cough   Acute bronchitis, unspecified organism     This document serves as a record of the services and decisions personally performed and made by Musa Griffin,*. It was created on HIS/HER behalf by   Coretta Beasley, a trained medical scribe. The creation of this document is based the provider's statements to the medical scribe.  Coretta Beasley 8:58 PM 10/27/2018    Provider:   The information in this document, created by the medical scribe for me, accurately reflects the services I personally performed and the decisions made by me. I have reviewed and approved this document for accuracy prior to leaving the patient care area.  Musa Griffin,* 8:58 PM 10/27/2018    10/27/2018   [facility] EMERGENCY DEPARTMENT     Musa Griffin MD  10/27/18 [id number]

## 2018-10-28 NOTE — DISCHARGE INSTRUCTIONS
Viral Bronchitis (Adult)    You have a viral bronchitis. Bronchitis is inflammation and swelling of the lining of the lungs. This is often caused by an infection. Symptoms include a dry, hacking cough that is worse at night. The cough may bring up yellow-green mucus. You may also feel short of breath or wheeze. Other symptoms may include tiredness, chest discomfort, and chills.  Bronchitis that is caused by a virus is not treated with antibiotics. Instead, medicines may be given to help relieve symptoms. Symptoms can last up to 2 weeks, although the cough may last much longer.  This illness is contagious during the first few days and is spread through the air by coughing and sneezing, or by direct contact (touching the sick person and then touching your own eyes, nose, or mouth).  Most viral illnesses resolve within 10 to 14 days with rest and simple home remedies, although they may sometimes last for several weeks.  Home care    If symptoms are severe, rest at home for the first 2 to 3 days. When you go back to your usual activities, don't let yourself get too tired.    Do not smoke. Also avoid being exposed to secondhand smoke.    You may use over-the-counter medicine to control fever or pain, unless another pain medicine was prescribed. If you have chronic liver or kidney disease or have ever had a stomach ulcer or gastrointestinal bleeding, talk with your healthcare provider before using these medicines. Also talk to your provider if you are taking medicine to prevent blood clots. Aspirin should never be given to anyone younger than 18 years of age who is ill with a viral infection or fever. It may cause severe liver or brain damage.    Your appetite may be poor, so a light diet is fine. Avoid dehydration by drinking 6 to 8 glasses of fluids per day (such as water, soft drinks, sports drinks, juices, tea, or soup). Extra fluids will help loosen secretions in the nose and lungs.    Over-the-counter cough, cold,  and sore-throat medicines will not shorten the length of the illness, but they may help to reduce symptoms. Don't use decongestants if you have high blood pressure.  Follow-up care  Follow up with your healthcare provider, or as advised. If you had an X-ray or ECG (electrocardiogram), a specialist will review it. You will be notified of any new findings that may affect your care.  If you are age 65 or older, or if you have a chronic lung disease or condition that affects your immune system, or you smoke, ask your healthcare provider about getting a pneumococcal vaccine and a yearly flu shot (influenza vaccine).  When to seek medical advice  Call your healthcare provider right away if any of these occur:    Fever of 100.4 F (38 C) or higher, or as directed by your healthcare provider    Coughing up increased amounts of colored sputum    Weakness, drowsiness, headache, facial pain, ear pain, or a stiff neck  Call 911  Call 911 if any of these occur:    Coughing up blood    Worsening weakness, drowsiness, headache, or stiff neck    Trouble breathing, wheezing, or pain with breathing  Date Last Reviewed: 9/13/2015 2000-2017 [employer]. [address] [place] [address], [place] [address]. All rights reserved. This information is not intended as a substitute for professional medical care. Always follow your healthcare professional's instructions.

## 2018-11-02 ENCOUNTER — HOSPITAL ENCOUNTER (EMERGENCY)
Facility: CLINIC | Age: 33
Discharge: HOME | End: 2018-11-02
Attending: PHYSICIAN ASSISTANT | Admitting: PHYSICIAN ASSISTANT
Payer: COMMERCIAL

## 2018-11-02 ENCOUNTER — APPOINTMENT (OUTPATIENT)
Dept: GENERAL RADIOLOGY | Facility: CLINIC | Age: 33
End: 2018-11-02
Attending: PHYSICIAN ASSISTANT
Payer: COMMERCIAL

## 2018-11-02 VITALS
SYSTOLIC BLOOD PRESSURE: 127 MMHG | TEMPERATURE: 98.6 F | DIASTOLIC BLOOD PRESSURE: 86 MMHG | HEART RATE: 108 BPM | OXYGEN SATURATION: 98 % | RESPIRATION RATE: 18 BRPM

## 2018-11-02 DIAGNOSIS — J98.01 BRONCHOSPASM: ICD-10-CM

## 2018-11-02 DIAGNOSIS — J20.9 ACUTE BRONCHITIS, UNSPECIFIED ORGANISM: ICD-10-CM

## 2018-11-02 DIAGNOSIS — R07.89 LEFT-SIDED CHEST WALL PAIN: ICD-10-CM

## 2018-11-02 PROCEDURE — G0463 HOSPITAL OUTPT CLINIC VISIT: HCPCS | Mod: 25 | Performed by: PHYSICIAN ASSISTANT

## 2018-11-02 PROCEDURE — 71046 X-RAY EXAM CHEST 2 VIEWS: CPT

## 2018-11-02 PROCEDURE — 99214 OFFICE O/P EST MOD 30 MIN: CPT | Mod: Z6 | Performed by: PHYSICIAN ASSISTANT

## 2018-11-02 RX ORDER — PREDNISONE 10 MG/1
TABLET ORAL
Qty: 32 TABLET | Refills: 0 | Status: SHIPPED | OUTPATIENT
Start: 2018-11-02 | End: 2019-03-01

## 2018-11-02 NOTE — DISCHARGE INSTRUCTIONS
Viral or Bacterial Bronchitis with Wheezing (Adult)    Bronchitis is an infection of the air passages. It often occurs during a cold and is usually caused by a virus. Symptoms include cough with mucus (phlegm) and low-grade fever. This illness is contagious during the first few days and is spread through the air by coughing and sneezing, or by direct contact (touching the sick person and then touching your own eyes, nose, or mouth).  If there is a lot of inflammation, air flow is restricted. The air passages may also go into spasm, especially if you have asthma. This causes wheezing and difficulty breathing even in people who do not have asthma.  Bronchitis usually lasts 7 to 14 days. The wheezing should improve with treatment during the first week. An inhaler is often prescribed to relax the air passages and stop wheezing. Antibiotics will be prescribed if your doctor thinks there is also a secondary bacterial infection.  Home care    If symptoms are severe, rest at home for the first 2 to 3 days. When you go back to your usual activities, don't let yourself get too tired.    Do not smoke. Also avoid being exposed to secondhand smoke.    You may use over-the-counter medicine to control fever or pain, unless another medicine was prescribed. Note: If you have chronic liver or kidney disease or have ever had a stomach ulcer or gastrointestinal bleeding, talk with your healthcare provider before using these medicines. Also talk to your provider if you are taking medicine to prevent blood clots.) Aspirin should never be given to anyone younger than 18 years of age who is ill with a viral infection or fever. It may cause severe liver or brain damage.    Your appetite may be poor, so a light diet is fine. Avoid dehydration by drinking 6 to 8 glasses of fluids per day (such as water, soft drinks, sports drinks, juices, tea, or soup). Extra fluids will help loosen secretions in the nose and lungs.    Over-the-counter  cough, cold, and sore-throat medicines will not shorten the length of the illness, but they may be helpful to reduce symptoms. (Note: Do not use decongestants if you have high blood pressure.)    If you were given an inhaler, use it exactly as directed. If you need to use it more often than prescribed, your condition may be worsening. If this happens, contact your healthcare provider.    If prescribed, finish all antibiotic medicine, even if you are feeling better after only a few days.  Follow-up care  Follow up with your healthcare provider, or as advised. If you had an X-ray or ECG (electrocardiogram), a specialist will review it. You will be notified of any new findings that may affect your care.  If you are age 65 or older, or if you have a chronic lung disease or condition that affects your immune system, or you smoke, ask your healthcare provider about getting a pneumococcal vaccine and a yearly flu shot (influenza vaccine).  When to seek medical advice  Call your healthcare provider right away if any of these occur:    Fever of 100.4 F (38 C) or higher, or as directed by your healthcare provider    Coughing up increasing amounts of colored sputum    Weakness, drowsiness, headache, facial pain, ear pain, or a stiff neck  Call 911  Call 911 if any of these occur.    Coughing up blood    Worsening weakness, drowsiness, headache, or stiff neck    Increased wheezing not helped with medication, shortness of breath, or pain with breathing  Date Last Reviewed: 9/13/2015 2000-2017 [employer]. [address] [place] [id number]. All rights reserved. This information is not intended as a substitute for professional medical care. Always follow your healthcare professional's instructions.

## 2018-11-02 NOTE — ED PROVIDER NOTES
History     Chief Complaint   Patient presents with     Cough     for 1 month, now has pain with coughing L ribs, breath sounds in all floyd     HPI  Mario Posada is a 33 year old male with hx pneumothorax, alcohol pancreatitis who presents with complaints of persistent cough over the past month.  Patient states the cough is productive of sputum production.  He also has associated wheezing but denies shortness of breath.  Patient states over the past 2 days he has developed a sharp pain to his left lower ribs that occurs with coughing and deep breathing only.  Patient states that these ribs are tender to touch as well.  Patient is concerned as he has history of spontaneous pneumothorax twice in the past 3 years.  Patient has been seen three times in the ED over the past month for acute bronchitis.  On 10/4/2018 patient was treated with Azithromycin and an Albuterol inhaler. On 10/11/2018, the patient was subsequently placed on Prednisone for his persistent symptoms and bronchospasm.  On 10/27/18, patient was again treated with a Prednisone burst.  Since states his cough and symptoms improve while he is on Prednisone but as soon as he finishes the Prednisone, he has a worsening of his symptoms once again.  Patient also complains of associated nasal congestion the same amount of time.  Denies fevers, chills, rash, or sinus pressure.  Patient does not have history of PE/DVT.  No history of recent surgery/immobilization/long trips.  He denies hemoptysis or unilateral leg pain or swelling.  She has a past smoker.  He has no formal diagnosis of asthma in the past.  He has been using an inhaler at home as needed for wheezing as well.       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 yes         Pneumothorax on left 03/10/2015     Priority: Medium        Past Medical History:    No past medical history on file.    Past Surgical History:    No past surgical history on file.    Family History:    Family History   Problem Relation Age of Onset     Unknown/Adopted Father        Social History:  Marital Status:  Single [1]  Social History   Substance Use Topics     Smoking status: Former Smoker     Packs/day: 0.50     Years: 9.00     Types: Cigarettes     Smokeless tobacco: Never Used     Alcohol use Yes        Medications:      predniSONE (DELTASONE) 10 MG tablet   albuterol (PROAIR HFA/PROVENTIL HFA/VENTOLIN HFA) 108 (90 Base) MCG/ACT inhaler   azithromycin (ZITHROMAX) 250 MG tablet   HYDROcodone-acetaminophen (NORCO) 5-325 MG per tablet   IBUPROFEN PO   oxyCODONE-acetaminophen (PERCOCET) 5-325 MG per tablet         Review of Systems   Constitutional: Negative for chills and fever.   HENT: Positive for congestion.    Respiratory: Positive for cough and wheezing.    Cardiovascular: Positive for chest pain (left-sided with coughing).   Gastrointestinal: Negative.    Musculoskeletal: Negative.    Skin: Negative.    Neurological: Negative.    All other systems reviewed and are negative.      Physical Exam   BP: 127/86  Pulse: 108  Temp: 98.6  F (37  C)  Resp: 18  SpO2: 98 %      Physical Exam   Constitutional: He is oriented to person, place, and time. He appears well-developed and well-nourished.  Non-toxic appearance. No distress.   HENT:   Head: Normocephalic and atraumatic.   Right Ear: Tympanic membrane, external ear and ear canal normal.   Left Ear: Tympanic membrane, external ear and ear canal normal.   Nose: Mucosal edema present.   Mouth/Throat: Uvula is midline, oropharynx is clear and moist and mucous membranes are normal. No oropharyngeal exudate, posterior oropharyngeal edema, posterior oropharyngeal erythema or tonsillar abscesses.   Eyes: Conjunctivae and  EOM are normal. Pupils are equal, round, and reactive to light.   Neck: Normal range of motion. Neck supple. No rigidity.   Cardiovascular: Normal rate, regular rhythm and normal heart sounds.    Pulmonary/Chest: Effort normal. No accessory muscle usage or stridor. No respiratory distress. He has no decreased breath sounds. He has wheezes. He has no rhonchi. He has no rales. He exhibits tenderness.       Tenderness to palpation overlying left lateral inferior chest wall.    Musculoskeletal: Normal range of motion. He exhibits no edema or tenderness.   Lymphadenopathy:     He has no cervical adenopathy.   Neurological: He is alert and oriented to person, place, and time.   Skin: Skin is warm and dry. No rash noted.       ED Course     ED Course     Procedures      Results for orders placed or performed during the hospital encounter of 11/02/18 (from the past 24 hour(s))   XR Chest 2 Views    Narrative    XR CHEST TWO VIEWS  11/2/2018 6:06 PM     HISTORY: Cough, left-sided rib pain, history of pneumothorax.    COMPARISON: 10/27/2018.      Impression    IMPRESSION: The lungs appear hyperexpanded which may be related to  obstructive airways disease. Otherwise no active cardiopulmonary  disease and no change since the prior exam.    JUANITO HANCOCK MD       Medications - No data to display     WELLS PE SCORE for Pulmonary Embolism likelihood (calculator)  Background  Calculates likelihood of PE based on 7 criteria including suspected DVT, HR>100, recent surgery or immobilization, prior VTE, hemoptysis, active cancer.  Data  has Pneumothorax on left; Acute pancreatitis; Alcohol-induced pancreatitis; and Alcoholic pancreatitis on his problem list.   has no past surgical history on file.  Pulse: 108  Criteria   Of possible 12.5 points for 7 possible items:  1.5 points: Tachycardia with pulse >100 beats per minute  Interpretation  Wells PE Score: 1.5  Score 0-2 points: Low PE probability (3.6% risk)      Assessments & Plan  (with Medical Decision Making)     Pt is a 33 year old male with hx pneumothorax, alcohol pancreatitis who presents with complaints of persistent cough over the past month.  Patient states the cough is productive of sputum production.  He also has associated wheezing but denies shortness of breath.  Patient states over the past 2 days he has developed a sharp pain to his left lower ribs that occurs with coughing and deep breathing only.  Patient states that these ribs are tender to touch as well.  Patient is concerned as he has history of spontaneous pneumothorax twice in the past 3 years.  Patient has been seen three times in the ED over the past month for acute bronchitis.  On 10/4/2018 patient was treated with Azithromycin and an Albuterol inhaler. On 10/11/2018, the patient was subsequently placed on Prednisone for his persistent symptoms and bronchospasm.  On 10/27/18, patient was again treated with a Prednisone burst.  Since states his cough and symptoms improve while he is on Prednisone but as soon as he finishes the Prednisone, he has a worsening of his symptoms once again.  Patient also complains of associated nasal congestion the same amount of time.  Pt is afebrile on arrival.  Exam as above.  Chest x-ray shows hyper expanded lungs which may be related to an obstructive airway disease.  Otherwise no cardiopulmonary disease is noted.  No evidence of pneumothorax or pneumonia.  Discussed results with patient.  As patient has had relief while he is on prednisone, will extend a course of prednisone.  Encouraged close follow-up as he likely needs pulmonary function testing.  I have a lower suspicion for pulmonary embolism causing patient symptoms at this time as he does not have risk factors for PE; patient denies hemoptysis, history of immobilization/surgery, unilateral leg pain/swelling, malignancy, etc.  I suspect patient's left-sided chest wall pain is due to a month of coughing causing inflammation of his  chest wall.  Patient has reproducible pain of the area with palpation.  Return precautions were reviewed.  Hand-outs were provided.    Patient was sent with Prednisone burst and taper and was instructed to follow-up with PCP in 3-5 days for continued care and management or sooner if new or worsening symptoms.  He is to return to the ED for persistent and/or worsening symptoms.  Patient expressed understanding of the diagnosis and plan and was discharged home in good condition.    I have reviewed the nursing notes.    I have reviewed the findings, diagnosis, plan and need for follow up with the patient.    Discharge Medication List as of 11/2/2018  6:41 PM          Final diagnoses:   Acute bronchitis, unspecified organism   Bronchospasm   Left-sided chest wall pain       11/2/2018   [facility] EMERGENCY DEPARTMENT      Disclaimer:  This note consists of symbols derived from keyboarding, dictation and/or voice recognition software.  As a result, there may be errors in the script that have gone undetected.  Please consider this when interpreting information found in this chart.     Anjali Clarke PA-C  11/03/18 3029

## 2018-11-02 NOTE — ED TRIAGE NOTES
Patient presents today with cough, and left side pain.Pain  Symptoms started three days ago, and cough has been on going  . Arrived to urgent care ambulatory.Pt is worried because he has had two collapsed lungs and would like to get it looked at.

## 2018-11-02 NOTE — ED AVS SNAPSHOT
[facility] Emergency Department    [address] [address]    Phone:  [phone]    Fax:  [phone]                                       Mario Posada   MRN: [id number]    Department:  [facility] Emergency Department   Date of Visit:  11/2/2018           Patient Information     Date Of Birth          1985        Your diagnoses for this visit were:     Acute bronchitis, unspecified organism     Bronchospasm     Left-sided chest wall pain        You were seen by Anjali Clarke PA-C.      Follow-up Information     Call [facility].    Specialty:  Pulmonology    Why:  For follow-up    Contact information:    [address] [place] [address] [phone]        Follow up with [facility] Emergency Department.    Specialty:  EMERGENCY MEDICINE    Why:  As needed, If symptoms worsen    Contact information:    [address] [place] [id number] [phone]    Additional information:    The medical center is located at   [address] [place] [address] (between [facility] and   [place] [address] in [place], four miles north   of [place]).        Discharge Instructions         Viral or Bacterial Bronchitis with Wheezing (Adult)    Bronchitis is an infection of the air passages. It often occurs during a cold and is usually caused by a virus. Symptoms include cough with mucus (phlegm) and low-grade fever. This illness is contagious during the first few days and is spread through the air by coughing and sneezing, or by direct contact (touching the sick person and then touching your own eyes, nose, or mouth).  If there is a lot of inflammation, air flow is restricted. The air passages may also go into spasm, especially if you have asthma. This causes wheezing and difficulty breathing even in people who do not have asthma.  Bronchitis usually lasts 7 to 14 days. The wheezing should improve with treatment during the first week. An inhaler is often  prescribed to relax the air passages and stop wheezing. Antibiotics will be prescribed if your doctor thinks there is also a secondary bacterial infection.  Home care    If symptoms are severe, rest at home for the first 2 to 3 days. When you go back to your usual activities, don't let yourself get too tired.    Do not smoke. Also avoid being exposed to secondhand smoke.    You may use over-the-counter medicine to control fever or pain, unless another medicine was prescribed. Note: If you have chronic liver or kidney disease or have ever had a stomach ulcer or gastrointestinal bleeding, talk with your healthcare provider before using these medicines. Also talk to your provider if you are taking medicine to prevent blood clots.) Aspirin should never be given to anyone younger than 18 years of age who is ill with a viral infection or fever. It may cause severe liver or brain damage.    Your appetite may be poor, so a light diet is fine. Avoid dehydration by drinking 6 to 8 glasses of fluids per day (such as water, soft drinks, sports drinks, juices, tea, or soup). Extra fluids will help loosen secretions in the nose and lungs.    Over-the-counter cough, cold, and sore-throat medicines will not shorten the length of the illness, but they may be helpful to reduce symptoms. (Note: Do not use decongestants if you have high blood pressure.)    If you were given an inhaler, use it exactly as directed. If you need to use it more often than prescribed, your condition may be worsening. If this happens, contact your healthcare provider.    If prescribed, finish all antibiotic medicine, even if you are feeling better after only a few days.  Follow-up care  Follow up with your healthcare provider, or as advised. If you had an X-ray or ECG (electrocardiogram), a specialist will review it. You will be notified of any new findings that may affect your care.  If you are age 65 or older, or if you have a chronic lung disease or  condition that affects your immune system, or you smoke, ask your healthcare provider about getting a pneumococcal vaccine and a yearly flu shot (influenza vaccine).  When to seek medical advice  Call your healthcare provider right away if any of these occur:    Fever of 100.4 F (38 C) or higher, or as directed by your healthcare provider    Coughing up increasing amounts of colored sputum    Weakness, drowsiness, headache, facial pain, ear pain, or a stiff neck  Call 911  Call 911 if any of these occur.    Coughing up blood    Worsening weakness, drowsiness, headache, or stiff neck    Increased wheezing not helped with medication, shortness of breath, or pain with breathing  Date Last Reviewed: 9/13/2015 2000-2017 [employer]. [address] [place] [address], [place] [address]. All rights reserved. This information is not intended as a substitute for professional medical care. Always follow your healthcare professional's instructions.          24 Hour Appointment Hotline       To make an appointment at any [facility], call [phone] ([phone]). If you don't have a family doctor or clinic, we will help you find one. [facility] clinics are conveniently located to serve the needs of you and your family.             Review of your medicines      CONTINUE these medicines which may have CHANGED, or have new prescriptions. If we are uncertain of the size of tablets/capsules you have at home, strength may be listed as something that might have changed.        Dose / Directions Last dose taken    predniSONE 10 MG tablet   Commonly known as:  DELTASONE   What changed:    - medication strength  - additional instructions   Quantity:  32 tablet        Take 4 tablets daily for 5 days,  take 2 tablets daily for 3 days, take 1 tablet daily for 3 days, take half a tablet for 3 days.   Refills:  0          Our records show that you are taking the medicines listed below. If these are incorrect, please call your  family doctor or clinic.        Dose / Directions Last dose taken    albuterol 108 (90 Base) MCG/ACT inhaler   Commonly known as:  PROAIR HFA/PROVENTIL HFA/VENTOLIN HFA   Dose:  2 puff   Quantity:  1 Inhaler        Inhale 2 puffs into the lungs every 6 hours as needed for shortness of breath / dyspnea or wheezing   Refills:  0        azithromycin 250 MG tablet   Commonly known as:  ZITHROMAX   Quantity:  6 tablet        Two tablets first day, then one tablet daily for four days.   Refills:  0        HYDROcodone-acetaminophen 5-325 MG per tablet   Commonly known as:  NORCO   Dose:  1-2 tablet   Quantity:  15 tablet        Take 1-2 tablets by mouth every 4 hours as needed for moderate to severe pain   Refills:  0        IBUPROFEN PO   Dose:  800 mg        Take 800 mg by mouth every 8 hours as needed for moderate pain   Refills:  0        oxyCODONE-acetaminophen 5-325 MG per tablet   Commonly known as:  PERCOCET   Dose:  1-2 tablet   Quantity:  10 tablet        Take 1-2 tablets by mouth every 6 hours as needed for moderate to severe pain   Refills:  0                Prescriptions were sent or printed at these locations (1 Prescription)                   [facility] [place], [place] - [address]   [address] [address]    Telephone:  [phone]   Fax:  [phone]   Hours:                  E-Prescribed (1 of 1)         predniSONE (DELTASONE) 10 MG tablet                Procedures and tests performed during your visit     XR Chest 2 Views      Orders Needing Specimen Collection     None      Pending Results     Date and Time Order Name Status Description    11/2/2018 1757 XR Chest 2 Views Preliminary             Pending Culture Results     No orders found from 10/31/2018 to 11/3/2018.            Pending Results Instructions     If you had any lab results that were not finalized at the time of your Discharge, you can call the ED Lab Result RN at [phone]. You will be contacted by  "this team for any positive Lab results or changes in treatment. The nurses are available 7 days a week from 10A to 6:30P.  You can leave a message 24 hours per day and they will return your call.        Test Results From Your Hospital Stay        2018  6:29 PM      Narrative     XR CHEST TWO VIEWS  2018 6:06 PM     HISTORY: Cough, left-sided rib pain, history of pneumothorax.    COMPARISON: 10/27/2018.        Impression     IMPRESSION: The lungs appear hyperexpanded which may be related to  obstructive airways disease. Otherwise no active cardiopulmonary  disease and no change since the prior exam.                Thank you for choosing [facility]       Thank you for choosing [facility] for your care. Our goal is always to provide you with excellent care. Hearing back from our patients is one way we can continue to improve our services. Please take a few minutes to complete the written survey that you may receive in the mail after you visit with us. Thank you!        [facility] Information     [employer] lets you send messages to your doctor, view your test results, renew your prescriptions, schedule appointments and more. To sign up, go to [email or web address] . Click on \"Log in\" on the left side of the screen, which will take you to the Welcome page. Then click on \"Sign up Now\" on the right side of the page.     You will be asked to enter the access code listed below, as well as some personal information. Please follow the directions to create your username and password.     Your access code is: [email or web address]  Expires: 2019  9:09 AM     Your access code will  in 90 days. If you need help or a new code, please call your [facility] clinic or [phone].        Care EveryWhere ID     This is your Care EveryWhere ID. This could be used by other organizations to access your [facility] medical records  [id number]        Equal Access to Services     BRIANNA BUCKLEY: kendrick Keith " shirley monroy waxay idiin hayaan adeeg kharash la'aan ah. [facility] [phone].    ATENCIÓN: Si habla vincent, tiene a germain disposición servicios gratuitos de asistencia lingüística. Llame al [phone].    We comply with applicable federal civil rights laws and [place] laws. We do not discriminate on the basis of race, color, national origin, age, disability, sex, sexual orientation, or gender identity.            After Visit Summary       This is your record. Keep this with you and show to your community pharmacist(s) and doctor(s) at your next visit.

## 2018-11-02 NOTE — ED AVS SNAPSHOT
[facility] Emergency Department    [address] [address]    Phone:  [phone]    Fax:  [phone]                                       Mario Posada   MRN: [id number]    Department:  [facility] Emergency Department   Date of Visit:  11/2/2018           After Visit Summary Signature Page     I have received my discharge instructions, and my questions have been answered. I have discussed any challenges I see with this plan with the nurse or doctor.    ..........................................................................................................................................  Patient/Patient Representative Signature      ..........................................................................................................................................  Patient Representative Print Name and Relationship to Patient    ..................................................               ................................................  Date                                   Time    ..........................................................................................................................................  Reviewed by Signature/Title    ...................................................              ..............................................  Date                                               Time          22EPIC Rev 08/18

## 2018-11-03 ASSESSMENT — ENCOUNTER SYMPTOMS
CHILLS: 0
COUGH: 1
FEVER: 0
GASTROINTESTINAL NEGATIVE: 1
NEUROLOGICAL NEGATIVE: 1
WHEEZING: 1
MUSCULOSKELETAL NEGATIVE: 1

## 2019-03-01 ENCOUNTER — APPOINTMENT (OUTPATIENT)
Dept: GENERAL RADIOLOGY | Facility: CLINIC | Age: 34
End: 2019-03-01
Attending: PHYSICIAN ASSISTANT

## 2019-03-01 ENCOUNTER — ANESTHESIA EVENT (OUTPATIENT)
Dept: EMERGENCY MEDICINE | Facility: CLINIC | Age: 34
End: 2019-03-01

## 2019-03-01 ENCOUNTER — APPOINTMENT (OUTPATIENT)
Dept: GENERAL RADIOLOGY | Facility: CLINIC | Age: 34
End: 2019-03-01
Attending: FAMILY MEDICINE

## 2019-03-01 ENCOUNTER — ANESTHESIA (OUTPATIENT)
Dept: EMERGENCY MEDICINE | Facility: CLINIC | Age: 34
End: 2019-03-01

## 2019-03-01 ENCOUNTER — HOSPITAL ENCOUNTER (EMERGENCY)
Facility: CLINIC | Age: 34
Discharge: HOME | End: 2019-03-01
Attending: PHYSICIAN ASSISTANT | Admitting: PHYSICIAN ASSISTANT

## 2019-03-01 VITALS
DIASTOLIC BLOOD PRESSURE: 93 MMHG | RESPIRATION RATE: 13 BRPM | BODY MASS INDEX: 18.13 KG/M2 | WEIGHT: 130 LBS | HEART RATE: 93 BPM | SYSTOLIC BLOOD PRESSURE: 128 MMHG | TEMPERATURE: 98 F | OXYGEN SATURATION: 100 %

## 2019-03-01 DIAGNOSIS — J93.83 SPONTANEOUS PNEUMOTHORAX: ICD-10-CM

## 2019-03-01 PROCEDURE — 71046 X-RAY EXAM CHEST 2 VIEWS: CPT

## 2019-03-01 PROCEDURE — 96374 THER/PROPH/DIAG INJ IV PUSH: CPT

## 2019-03-01 PROCEDURE — 25000125 ZZHC RX 250: Performed by: NURSE ANESTHETIST, CERTIFIED REGISTERED

## 2019-03-01 PROCEDURE — 99284 EMERGENCY DEPT VISIT MOD MDM: CPT | Mod: Z6 | Performed by: FAMILY MEDICINE

## 2019-03-01 PROCEDURE — 25800030 ZZH RX IP 258 OP 636: Performed by: NURSE ANESTHETIST, CERTIFIED REGISTERED

## 2019-03-01 PROCEDURE — 40000671 ZZH STATISTIC ANESTHESIA CASE

## 2019-03-01 PROCEDURE — 25000128 H RX IP 250 OP 636: Performed by: FAMILY MEDICINE

## 2019-03-01 PROCEDURE — 32551 INSERTION OF CHEST TUBE: CPT

## 2019-03-01 PROCEDURE — 37000011 ZZH ANESTHESIA WARD SERVICE: Performed by: NURSE ANESTHETIST, CERTIFIED REGISTERED

## 2019-03-01 PROCEDURE — 25000128 H RX IP 250 OP 636: Performed by: NURSE ANESTHETIST, CERTIFIED REGISTERED

## 2019-03-01 PROCEDURE — 71045 X-RAY EXAM CHEST 1 VIEW: CPT

## 2019-03-01 PROCEDURE — 99284 EMERGENCY DEPT VISIT MOD MDM: CPT | Mod: 25

## 2019-03-01 RX ORDER — PROPOFOL 10 MG/ML
INJECTION, EMULSION INTRAVENOUS PRN
Status: DISCONTINUED | OUTPATIENT
Start: 2019-03-01 | End: 2019-03-01

## 2019-03-01 RX ORDER — SODIUM CHLORIDE 9 MG/ML
INJECTION, SOLUTION INTRAVENOUS CONTINUOUS PRN
Status: DISCONTINUED | OUTPATIENT
Start: 2019-03-01 | End: 2019-03-01

## 2019-03-01 RX ORDER — HYDROMORPHONE HCL/0.9% NACL/PF 0.2MG/0.2
0.2 SYRINGE (ML) INTRAVENOUS ONCE
Status: DISCONTINUED | OUTPATIENT
Start: 2019-03-01 | End: 2019-03-01

## 2019-03-01 RX ORDER — KETAMINE HYDROCHLORIDE 10 MG/ML
INJECTION, SOLUTION INTRAMUSCULAR; INTRAVENOUS PRN
Status: DISCONTINUED | OUTPATIENT
Start: 2019-03-01 | End: 2019-03-01

## 2019-03-01 RX ORDER — HYDROMORPHONE HYDROCHLORIDE 1 MG/ML
0.5 INJECTION, SOLUTION INTRAMUSCULAR; INTRAVENOUS; SUBCUTANEOUS ONCE
Status: COMPLETED | OUTPATIENT
Start: 2019-03-01 | End: 2019-03-01

## 2019-03-01 RX ORDER — OXYCODONE HYDROCHLORIDE 5 MG/1
5-10 TABLET ORAL EVERY 8 HOURS PRN
Qty: 12 TABLET | Refills: 0 | Status: SHIPPED | OUTPATIENT
Start: 2019-03-01 | End: 2019-03-04

## 2019-03-01 RX ADMIN — PROPOFOL 50 MG: 10 INJECTION, EMULSION INTRAVENOUS at 15:39

## 2019-03-01 RX ADMIN — PROPOFOL 100 MG: 10 INJECTION, EMULSION INTRAVENOUS at 15:34

## 2019-03-01 RX ADMIN — PROPOFOL 50 MG: 10 INJECTION, EMULSION INTRAVENOUS at 15:43

## 2019-03-01 RX ADMIN — PROPOFOL 100 MG: 10 INJECTION, EMULSION INTRAVENOUS at 15:30

## 2019-03-01 RX ADMIN — Medication 0.5 MG: at 16:28

## 2019-03-01 RX ADMIN — SODIUM CHLORIDE: 9 INJECTION, SOLUTION INTRAVENOUS at 15:24

## 2019-03-01 RX ADMIN — KETAMINE HYDROCHLORIDE 50 MG: 10 INJECTION INTRAMUSCULAR; INTRAVENOUS at 15:30

## 2019-03-01 ASSESSMENT — LIFESTYLE VARIABLES: TOBACCO_USE: 1

## 2019-03-01 NOTE — ED PROVIDER NOTES
History     Chief Complaint   Patient presents with     Cough     cough for 3 days      HPI  Mario Posada is a 33 year old male who presents to the urgent care with concern over 2-3-day history of cough.  He additionally complains of possible wheezing, chronic nasal congestion.  He has not had any recent fevers, chills, myalgias, sore throat, chest pain, nausea, vomiting, diarrhea or abdominal pain.  He has not attempted any OTC treatments consistently. He denies any history of asthma, COPD.   He states concerned that he has had a prior history of 2 spontaneous pneumothoraces.  He is a former smoker who quit approximately 3 years ago.      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 yes         Pneumothorax on left 03/10/2015     Priority: Medium        Past Medical History:    History reviewed. No pertinent past medical history.    Past Surgical History:    History reviewed. No pertinent surgical history.    Family History:    Family History   Problem Relation Age of Onset     Unknown/Adopted Father      Social History:  Marital Status:  Single [1]  Social History     Tobacco Use     Smoking status: Former Smoker     Packs/day: 0.50     Years: 9.00     Pack years: 4.50     Types: Cigarettes     Smokeless tobacco: Never Used   Substance Use Topics     Alcohol use: Yes     Drug use: Yes     Types: Marijuana        Medications:      albuterol (PROAIR HFA/PROVENTIL HFA/VENTOLIN HFA) 108 (90 Base) MCG/ACT inhaler   HYDROcodone-acetaminophen (NORCO) 5-325 MG per tablet   IBUPROFEN PO   oxyCODONE-acetaminophen (PERCOCET) 5-325 MG per tablet     Review of Systems  CONSTITUTIONAL:NEGATIVE for fever, chills, change in  weight  INTEGUMENTARY/SKIN: NEGATIVE for worrisome rashes, moles or lesions  EYES: NEGATIVE for vision changes or irritation  ENT/MOUTH: POSITIVE for nasal congestion NEGATIVE for sore throat, ear pain   RESP:POSITIVE for cough and possible wheezing NEGATIVE for shortness of breath   GI: NEGATIVE for abdominal pain, diarrhea and vomiting  Physical Exam   BP: (!) 133/95  Pulse: 100  Temp: 98  F (36.7  C)  Resp: 18  SpO2: 99 %  Physical Exam  GENERAL APPEARANCE:alert and no distress  EYES: EOMI,  PERRL, conjunctiva clear  HENT: ear canals and TM's normal.  Nose and mouth without ulcers, erythema or lesions  NECK: supple, nontender, no lymphadenopathy  RESP: lungs clear to auscultation - no rales, rhonchi or wheezes  CV: regular rates and rhythm, normal S1 S2, no murmur noted  SKIN: no suspicious lesions or rashes  ED Course        Procedures        Critical Care time:  none           Results for orders placed or performed during the hospital encounter of 03/01/19   Chest XR,  PA & LAT    Narrative    CHEST TWO VIEWS  3/1/2019 2:29 PM     HISTORY: 33-year-old patient with history of cough.       Impression    IMPRESSION: Since November 2, 2018, heart size is normal.  Moderately-sized left pneumothorax is noted. Blunting of the left  costophrenic sulcus may indicate trace pleural effusion, though was  present on previous exam. Right lung is clear, perhaps with minimal  apical thickening.    CRITICAL FINDINGS: Please note the above findings of a  moderately-sized left pneumothorax were discussed by myself to Dr. Ana Hassan, ordering provider, in urgent care at 2:35 PM on March 1, 2019. Patient will be directed to the emergency Department. Ana beatty understands nature of the findings of moderate pneumothorax.    CAMILA SANDS MD                        Medications - No data to display    Assessments & Plan (with Medical Decision Making)     I have reviewed the nursing notes.    I have reviewed the findings,  diagnosis, plan and need for follow up with the patient.          Medication List      There are no discharge medications for this visit.       Final diagnoses:   Spontaneous pneumothorax     33-year-old male with history of 2 prior spontaneous pneumothoraces presents to the urgent care with concern over 3-day history of cough with associated nasal congestion, possible wheezing.  Who had elevated heart rate upon arrival which had normalized at time of examination.  Physical exam findings as described above.  As part of evaluation he did have chest x-ray which did show a moderate sized left pneumothorax.  There was some blunting of the left costophrenic sulcus which could indicate a trace pleural effusion though was present on previous exam.  Received a call directly from radiologist upon his visualization of the images.  I then immediately discussed case with ER physician Dr. Syed Amezcua who agreed to accept transfer of patient care for placement of tube thoracostomy.  All further management and medical decision making at his discretion.      Disclaimer: This note consists of symbols derived from keyboarding, dictation, and/or voice recognition software. As a result, there may be errors in the script that have gone undetected.  Please consider this when interpreting information found in the chart.    3/1/2019   [facility] EMERGENCY DEPARTMENT     Ana Hassan PA-C  03/05/19 3534

## 2019-03-01 NOTE — ED PROVIDER NOTES
HPI  Current medications, past medical history, and social history are reviewed.    The patient is a 33-year-old male presenting with a cough for 3 days.  Please see the urgent care note provided by Ana Hassan details regarding this history.    ROS: All other review of systems are negative other than that noted above.     History reviewed. No pertinent past medical history.  History reviewed. No pertinent surgical history.  Medicines    oxyCODONE (ROXICODONE) 5 MG tablet   albuterol (PROAIR HFA/PROVENTIL HFA/VENTOLIN HFA) 108 (90 Base) MCG/ACT inhaler   HYDROcodone-acetaminophen (NORCO) 5-325 MG per tablet   IBUPROFEN PO   oxyCODONE-acetaminophen (PERCOCET) 5-325 MG per tablet     Family History   Problem Relation Age of Onset     Unknown/Adopted Father      Social History     Tobacco Use     Smoking status: Former Smoker     Packs/day: 0.50     Years: 9.00     Pack years: 4.50     Types: Cigarettes     Smokeless tobacco: Never Used   Substance Use Topics     Alcohol use: Yes     Drug use: Yes     Types: Marijuana         PHYSICAL  /66   Pulse 89   Temp 98  F (36.7  C) (Oral)   Resp 9   Wt 59 kg (130 lb)   SpO2 100%   BMI 18.13 kg/m    General: Patient is alert and in mild distress.  Gestational.  Neurological: Alert.  Moving upper and lower extremities equally, bilaterally.  Head / Neck: Atraumatic.  Ears: Not done.  Eyes: Pupils are equal, round, and reactive.  Normal conjunctiva.  Nose: Midline.  No epistaxis.  Mouth / Throat: No ulcerations or lesions.  Upper pharynx is not erythematous.  Moist.  Respiratory: No respiratory distress. CTA B.  Cardiovascular: Regular rhythm.  Peripheral extremities are warm.  No edema.  No calf tenderness.  Abdomen / Pelvis: Not tender.  No distention.  Soft throughout.  Genitalia: Not done.  Musculoskeletal: No tenderness over major muscles and joints.  Skin: No evidence of rash or trauma.        ED COURSE  1442.  The patient has a left-sided pneumothorax identified  on the chest x-ray.  This is described as moderate in size by radiology.  I will review this with the patient and discussed the need for pigtail thoracostomy tube.  If agreeable, this procedure will be documented below.  Of note, he does have a known history of prior spontaneous pneumothoraces, the last on 04/04/17.  He is a former smoker and continues to use marijuana.  He does use alcohol and is known to be an alcoholic.  He does have a history of acute pancreatitis but not cirrhosis.    Labs Ordered and Resulted from Time of ED Arrival Up to the Time of Departure from the ED - No data to display    IMAGING  Images reviewed by me.  Radiology report also reviewed.  Chest  XR, 1 view portable   Preliminary Result   IMPRESSION: Portable view of the chest is performed. Left pneumothorax   is noted. Pigtail drainage tube projects over the medial left upper   hemithorax. Right lung is clear. Heart is normal in size. No evidence   of right pneumothorax.      Chest XR,  PA & LAT   Final Result   IMPRESSION: Since November 2, 2018, heart size is normal.   Moderately-sized left pneumothorax is noted. Blunting of the left   costophrenic sulcus may indicate trace pleural effusion, though was   present on previous exam. Right lung is clear, perhaps with minimal   apical thickening.      CRITICAL FINDINGS: Please note the above findings of a   moderately-sized left pneumothorax were discussed by myself to Dr. Ana Hassan, ordering provider, in urgent care at 2:35 PM on March 1, 2019. Patient will be directed to the emergency Department. Ana beatty understands nature of the findings of moderate pneumothorax.      CAMILA SANDS MD          Narrative: Procedure: Tube Thoracostomy       Indication:        It is medically necessary to place a chest tube for      pneumothorax.      Consent:                Risks, benefits and alternatives were discussed with     patient and consent for procedure was obtained.      Timeout:                  Universal protocol was followed. TIME OUT     conducted just prior to starting procedure confirmed patient identity,    site/side, procedure, patient position, and availability of correct equipment    and implants.?  Yes      Medication:      Lidocaine: Local infiltration and Propofol: IV      Procedure Note:          Patient was placed in a semirecumbent position    with the head of the bed at 30 degrees.  The  left side was prepped with     Betadine.  Patient was medicated as above.  Incision was made     laterally in the midaxilary line.  Blunt dissection up and over the rib   was preformed until access was obtained to the pleural cavity.  A  Cook Catheter or 10 Bulgarian chest tube was placed and connected to     Heimlich valve and air aspirated.  Tube was sutured in place with 2-0    silk, and all connections banded.      Results:                       There was  no drainage, with post    procedure X-ray showing  partial     reexpansion of the lung.      Patient Status:            Patient tolerated the procedure  well. There were  no complications.      1615.  The actual chest tube procedure was performed by Dr. Finnegan.  This is documented above.  I did pull out about 300 ml prior to the patient leaving but after the portable XR was done.  Follow-up with general surgery on Monday.  The patient is given Dilaudid 0.5 mg IV prior to discharge.  Oxycodone prescription given, 12 tablets.  Return if worsening as discussed.    Medications   HYDROmorphone (PF) (DILAUDID) injection 0.5 mg (not administered)       IMPRESSION    ICD-10-CM    1. Spontaneous pneumothorax J93.83          Critical Care time:  none                    Syed Amezcua MD  03/01/19 2601

## 2019-03-01 NOTE — ED NOTES
Awake, VSS, taking PO, went over discharge instruction, ride here and to pharmacy to get prescription

## 2019-03-01 NOTE — ANESTHESIA PREPROCEDURE EVALUATION
Anesthesia Pre-Procedure Evaluation    Patient: Mario Posada   MRN: [id number] : 1985          Preoperative Diagnosis: * No surgery found *        History reviewed. No pertinent past medical history.  History reviewed. No pertinent surgical history.    Anesthesia Evaluation     . Pt has had prior anesthetic. Type: General and MAC    No history of anesthetic complications          ROS/MED HX    ENT/Pulmonary:     (+)tobacco use, Past use , . Other pulmonary disease left moderate pneumothorax.    Neurologic:  - neg neurologic ROS     Cardiovascular:  - neg cardiovascular ROS       METS/Exercise Tolerance:  >4 METS   Hematologic:  - neg hematologic  ROS       Musculoskeletal:  - neg musculoskeletal ROS       GI/Hepatic: Comment: Alcohol-induced pancreatitis        Renal/Genitourinary:  - ROS Renal section negative       Endo:  - neg endo ROS       Psychiatric:  - neg psychiatric ROS       Infectious Disease:  - neg infectious disease ROS       Malignancy:      - no malignancy   Other:    - neg other ROS                      Physical Exam  Normal systems: cardiovascular and dental    Airway   Mallampati: II  TM distance: >3 FB  Neck ROM: full    Dental     Cardiovascular   Rhythm and rate: regular and normal      Pulmonary     Other findings: Right clear  Left diminished        Lab Results   Component Value Date    WBC 10.5 2016    HGB 12.9 (L) 2016    HCT 38.8 (L) 2016     2016     2016    POTASSIUM 3.8 2016    CHLORIDE 104 2016    CO2 31 2016    BUN 1 (L) 2016    CR 0.68 2016     (H) 2016    LARY 9.4 2016    MAG 2.1 2016    ALBUMIN 3.6 2016    PROTTOTAL 7.2 2016    ALT 51 2016    AST 39 2016    ALKPHOS 85 2016    BILITOTAL 0.8 2016    LIPASE 2,370 (H) 2016    WILLIAM 18 2016    INR 1.04 2016       Preop Vitals  BP Readings from Last 3 Encounters:   19 (!)  "133/95   11/02/18 127/86   10/27/18 (!) 136/91    Pulse Readings from Last 3 Encounters:   03/01/19 100   11/02/18 108   04/09/17 118      Resp Readings from Last 3 Encounters:   03/01/19 18   11/02/18 18   10/27/18 17    SpO2 Readings from Last 3 Encounters:   03/01/19 99%   11/02/18 98%   10/27/18 95%      Temp Readings from Last 1 Encounters:   03/01/19 36.7  C (98  F) (Oral)    Ht Readings from Last 1 Encounters:   10/27/18 1.803 m (5' 11\")      Wt Readings from Last 1 Encounters:   10/27/18 60.3 kg (133 lb)    Estimated body mass index is 18.55 kg/m  as calculated from the following:    Height as of 10/27/18: 1.803 m (5' 11\").    Weight as of 10/27/18: 60.3 kg (133 lb).       Anesthesia Plan      History & Physical Review  History and physical reviewed and following examination; no interval change.    ASA Status:  2 emergent.    NPO Status:  Waived due to emergency    Plan for MAC Reason for MAC:  Deep or markedly invasive procedure (G8)         Postoperative Care  Postoperative pain management:  IV analgesics.      Consents  Anesthetic plan, risks, benefits and alternatives discussed with:  Patient..                 Cristobal Villa CRNA, APRN CRNA  "

## 2019-03-01 NOTE — ANESTHESIA CARE TRANSFER NOTE
Patient: Mario Posada    * No procedures listed *    Diagnosis: * No pre-op diagnosis entered *  Diagnosis Additional Information: No value filed.    Anesthesia Type:   MAC     Note:  Airway :Nasal Cannula  Patient transferred to:Emergency Department  Handoff Report: Identifed the Patient, Identified the Reponsible Provider, Reviewed the pertinent medical history, Discussed the surgical course, Reviewed Intra-OP anesthesia mangement and issues during anesthesia, Set expectations for post-procedure period and Allowed opportunity for questions and acknowledgement of understanding      Vitals: (Last set prior to Anesthesia Care Transfer)    CRNA VITALS  3/1/2019 1519 - 3/1/2019 1609      3/1/2019             SpO2:  99 %    EKG:  Sinus tachycardia                Electronically Signed By: Cristobal Villa CRNA, APRN CRNA  March 1, 2019  4:09 PM

## 2019-03-01 NOTE — ED NOTES
Awake, tolerated procedure, definite air release from heimlich when coughed, given water to drink, xray here to do portable chest

## 2019-03-01 NOTE — DISCHARGE INSTRUCTIONS
Return to the Emergency Room if the following occurs:     Severely worsened breathing, fever >101, Severely worsened pain, or for any concern at anytime.    Or, follow-up with the following provider as we discussed:     Return to the general surgery clinic on Monday for reevaluation.    Medications discussed:    Ibuprofen 600 mg every six hours for pain (7 days duration).  Tylenol 1000 mg every six hours for pain (7 days duration).  Therefore, you can alternate these every three hours and do it safely.  Oxycodone 1-2 tablets every six hours as needed for pain control that is not relieved by the above.    If you received pain-relieving or sedating medication during your time in the ER, avoid alcohol, driving automobiles, or working with machinery.  Also, a responsible adult must stay with you.        Call the Nurse Advice Line at [phone] or [phone] for any concern at anytime.

## 2019-03-01 NOTE — ED AVS SNAPSHOT
[facility]  [address] [address]  Phone:  [phone]  Fax:  [phone]                                    Mario Posada   MRN: [id number]    Department:  [facility]   Date of Visit:  3/1/2019           After Visit Summary Signature Page    I have received my discharge instructions, and my questions have been answered. I have discussed any challenges I see with this plan with the nurse or doctor.    ..........................................................................................................................................  Patient/Patient Representative Signature      ..........................................................................................................................................  Patient Representative Print Name and Relationship to Patient    ..................................................               ................................................  Date                                   Time    ..........................................................................................................................................  Reviewed by Signature/Title    ...................................................              ..............................................  Date                                               Time          22EPIC Rev 08/18

## 2019-03-04 ENCOUNTER — OFFICE VISIT (OUTPATIENT)
Dept: SURGERY | Facility: CLINIC | Age: 34
End: 2019-03-04

## 2019-03-04 ENCOUNTER — ANCILLARY PROCEDURE (OUTPATIENT)
Dept: GENERAL RADIOLOGY | Facility: CLINIC | Age: 34
End: 2019-03-04
Attending: SURGERY

## 2019-03-04 VITALS
SYSTOLIC BLOOD PRESSURE: 108 MMHG | WEIGHT: 130 LBS | HEIGHT: 71 IN | TEMPERATURE: 97.9 F | HEART RATE: 81 BPM | BODY MASS INDEX: 18.2 KG/M2 | RESPIRATION RATE: 16 BRPM | DIASTOLIC BLOOD PRESSURE: 70 MMHG

## 2019-03-04 DIAGNOSIS — J93.11 PRIMARY SPONTANEOUS PNEUMOTHORAX: ICD-10-CM

## 2019-03-04 DIAGNOSIS — J93.11 PRIMARY SPONTANEOUS PNEUMOTHORAX: Primary | ICD-10-CM

## 2019-03-04 PROCEDURE — 71046 X-RAY EXAM CHEST 2 VIEWS: CPT

## 2019-03-04 PROCEDURE — 99201 ZZC OFFICE/OUTPT VISIT, NEW, LEVEL I: CPT | Performed by: SURGERY

## 2019-03-04 RX ORDER — ALBUTEROL SULFATE 90 UG/1
2 AEROSOL, METERED RESPIRATORY (INHALATION) 4 TIMES DAILY
Qty: 1 INHALER | Refills: 1 | Status: SHIPPED | OUTPATIENT
Start: 2019-03-04 | End: 2020-01-14

## 2019-03-04 RX ORDER — TRAMADOL HYDROCHLORIDE 50 MG/1
50 TABLET ORAL EVERY 6 HOURS PRN
Qty: 20 TABLET | Refills: 0 | Status: SHIPPED | OUTPATIENT
Start: 2019-03-04 | End: 2020-01-06

## 2019-03-04 ASSESSMENT — MIFFLIN-ST. JEOR: SCORE: 1566.81

## 2019-03-04 NOTE — PROGRESS NOTES
Patient is a 33-year-old man referred from the emergency room for management of a chest tube.  The patient had a chest tube placed in the left chest for spontaneous pneumothorax over the weekend.  He is having some discomfort related to the tube as expected, but otherwise is doing okay.    Chest x-ray today shows almost complete resolution of the pneumothorax.    On exam the tube is in good position.  I placed a cup of water under the Heimlich valve and had the patient cough and strain.  There was a large amount of air that came out when he did this.  Clearly there is an ongoing air leak.    Recommendation is to leave the tube in for now and have him continue to check the tube with a cup of water like we did in clinic today.  Once the bubbling stops he can come in and will take the tube out.  If the bubbling continues then we may need to refer him for thoracic surgery to get this taken care of once and for all.    Patient also has some wheezing at times and so I gave him a refill of his inhaler.  He used to use an albuterol inhaler.  I also gave him a small supply of tramadol to help with the chest tube pain.  I advised him to use that primarily at night.    He should return to the clinic once the bubbling stops, and he is been given a card with phone numbers to contact us.    Nigel Lama MD FACS

## 2019-03-04 NOTE — NURSING NOTE
"Chief Complaint   Patient presents with     Consult     pull chest tube       Initial /70 (BP Location: Right arm, Patient Position: Chair, Cuff Size: Adult Regular)   Pulse 81   Temp 97.9  F (36.6  C) (Tympanic)   Resp 16   Ht 1.803 m (5' 11\")   Wt 59 kg (130 lb)   BMI 18.13 kg/m   Estimated body mass index is 18.13 kg/m  as calculated from the following:    Height as of this encounter: 1.803 m (5' 11\").    Weight as of this encounter: 59 kg (130 lb).  Medications and allergies reviewed.    Jonathan LEROY, CMA    "

## 2019-03-04 NOTE — LETTER
3/4/2019         RE: Mario Posada  [address] [facility] [address]        Dear Colleague,    Thank you for referring your patient, Mario Posada, to the [facility]. Please see a copy of my visit note below.    Patient is a 33-year-old man referred from the emergency room for management of a chest tube.  The patient had a chest tube placed in the left chest for spontaneous pneumothorax over the weekend.  He is having some discomfort related to the tube as expected, but otherwise is doing okay.    Chest x-ray today shows almost complete resolution of the pneumothorax.    On exam the tube is in good position.  I placed a cup of water under the Heimlich valve and had the patient cough and strain.  There was a large amount of air that came out when he did this.  Clearly there is an ongoing air leak.    Recommendation is to leave the tube in for now and have him continue to check the tube with a cup of water like we did in clinic today.  Once the bubbling stops he can come in and will take the tube out.  If the bubbling continues then we may need to refer him for thoracic surgery to get this taken care of once and for all.    Patient also has some wheezing at times and so I gave him a refill of his inhaler.  He used to use an albuterol inhaler.  I also gave him a small supply of tramadol to help with the chest tube pain.  I advised him to use that primarily at night.    He should return to the clinic once the bubbling stops, and he is been given a card with phone numbers to contact us.    Nigel Lama MD FACS    Again, thank you for allowing me to participate in the care of your patient.        Sincerely,        Nigel Lama MD

## 2019-03-12 ENCOUNTER — ANCILLARY PROCEDURE (OUTPATIENT)
Dept: GENERAL RADIOLOGY | Facility: CLINIC | Age: 34
End: 2019-03-12
Attending: SURGERY

## 2019-03-12 ENCOUNTER — OFFICE VISIT (OUTPATIENT)
Dept: SURGERY | Facility: CLINIC | Age: 34
End: 2019-03-12

## 2019-03-12 VITALS
BODY MASS INDEX: 18.2 KG/M2 | DIASTOLIC BLOOD PRESSURE: 90 MMHG | WEIGHT: 130 LBS | TEMPERATURE: 98.3 F | HEART RATE: 88 BPM | SYSTOLIC BLOOD PRESSURE: 129 MMHG | HEIGHT: 71 IN

## 2019-03-12 DIAGNOSIS — J93.83 SPONTANEOUS PNEUMOTHORAX: Primary | ICD-10-CM

## 2019-03-12 DIAGNOSIS — J93.83 SPONTANEOUS PNEUMOTHORAX: ICD-10-CM

## 2019-03-12 PROCEDURE — 71046 X-RAY EXAM CHEST 2 VIEWS: CPT

## 2019-03-12 PROCEDURE — 99212 OFFICE O/P EST SF 10 MIN: CPT | Performed by: SURGERY

## 2019-03-12 ASSESSMENT — MIFFLIN-ST. JEOR: SCORE: 1566.81

## 2019-03-12 NOTE — LETTER
[facility]  [address] [address]  Phone: [phone]      March 12, 2019      RE: Mario Posada  [address] [facility] [address]        To whom it may concern:    Mario Posada is under my professional care. The employee is ABLE to return to work today.    When the patient returns to work, the following restrictions apply until 3/19/2019:  A) Bend: Frequently (4-8 hours)  B) Squat: Frequently (4-8 hours)  C) Walk/Stand: Frequently (4-8 hours)  D) Reach Above Shoulders: Occasionally (1-3 hours)  E) Lift, carry, push, and pull no more than:  20 lbs for 1 week  Sincerely,    Edin Dean

## 2019-03-12 NOTE — LETTER
"    3/12/2019         RE: Mario Posada  [address] [facility] [address]        Dear Colleague,    Thank you for referring your patient, Mario Posada, to the [facility]. Please see a copy of my visit note below.    General Surgery Post Op    Pt returns for follow up visit s/p pigtail catheter placement for spontaneous pneumothorax.    He admits some discomfort at the chest tube site.  He notes no bubbles when he places heimlich valve in water.  He admits chronic cough. Denies smoking tobacco or marijuana.    Physical exam: Vitals: /90 (BP Location: Right arm, Patient Position: Sitting, Cuff Size: Adult Regular)   Pulse 88   Temp 98.3  F (36.8  C) (Tympanic)   Ht 1.803 m (5' 11\")   Wt 59 kg (130 lb)   BMI 18.13 kg/m     BMI= Body mass index is 18.13 kg/m .    Exam:  Constitutional: Thin, frail  Cardiovascular:RRR. No murmurs, clicks gallops or rub  Respiratory:  Lungs clear and equal bilaterally  Site C/D/I.  No leak with cough when placed in water.    Assessment:     ICD-10-CM    1. Spontaneous pneumothorax J93.83 THORACIC SURGERY REFERRAL     XR Chest 2 Views     Imaging reviewed after pulling chest tube.      Plan: Mr. Vus chest tube was removed without incident in office today after it showed no leak.  He will obtain chest x-ray now.  I discussed the nature of spontaneous pneumothorax with him.  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  At this time, I recommend referral to thoracic surgery for possible surgical intervention should they see fit with possible pleurodesis.  I discussed that if he has any shortness of breath he must call the emergency department or proceed to ED.  He expressed understanding.      Reviewed X-ray with patient.  He is feeling much better.  He agrees to come back to ED or call 911 with any increasing shortness of breath.  Weight limit of 20 lbs for 1 week after chest tube pull.    Edin Dean, DO on 3/12/2019 at " 10:51 AM        Again, thank you for allowing me to participate in the care of your patient.        Sincerely,        Edin Dean, DO

## 2019-03-12 NOTE — PROGRESS NOTES
"General Surgery Post Op    Pt returns for follow up visit s/p pigtail catheter placement for spontaneous pneumothorax.    He admits some discomfort at the chest tube site.  He notes no bubbles when he places heimlich valve in water.  He admits chronic cough. Denies smoking tobacco or marijuana.    Physical exam: Vitals: /90 (BP Location: Right arm, Patient Position: Sitting, Cuff Size: Adult Regular)   Pulse 88   Temp 98.3  F (36.8  C) (Tympanic)   Ht 1.803 m (5' 11\")   Wt 59 kg (130 lb)   BMI 18.13 kg/m    BMI= Body mass index is 18.13 kg/m .    Exam:  Constitutional: Thin, frail  Cardiovascular:RRR. No murmurs, clicks gallops or rub  Respiratory:  Lungs clear and equal bilaterally  Site C/D/I.  No leak with cough when placed in water.    Assessment:     ICD-10-CM    1. Spontaneous pneumothorax J93.83 THORACIC SURGERY REFERRAL     XR Chest 2 Views     Imaging reviewed after pulling chest tube.      Plan: Mr. Mitchell chest tube was removed without incident in office today after it showed no leak.  He will obtain chest x-ray now.  I discussed the nature of spontaneous pneumothorax with him.  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  At this time, I recommend referral to thoracic surgery for possible surgical intervention should they see fit with possible pleurodesis.  I discussed that if he has any shortness of breath he must call the emergency department or proceed to ED.  He expressed understanding.      Reviewed X-ray with patient.  He is feeling much better.  He agrees to come back to ED or call 911 with any increasing shortness of breath.  Weight limit of 20 lbs for 1 week after chest tube pull.    Edin Dean, DO on 3/12/2019 at 10:51 AM      "

## 2019-03-12 NOTE — PATIENT INSTRUCTIONS
1. Keep dressing on until Friday  2. Return immediately or call 911 with any shortness of breath  Patient Education     Spontaneous Pneumothorax  Pneumothorax is when air leaks out and gets trapped in the space between the lung and the chest wall (pleural space). It can cause complete or partial collapse of a lung. The trapped air prevents the lung from re-inflating. Spontaneous pneumothorax occurs when a weakened spot on the lung surface ( bleb ) ruptures. It may occur in people with asthma or emphysema, or even in those with no pre-existing lung disease.    If your pneumothorax is small, it should get better without treatment and can be managed at home. If the amount of trapped air grows larger, it must be removed with a tube placed into the pleural space (catheter).  Home care    Rest at home. Do not do vigorous activity or exercise. Speak with your doctor to determine when it is safe to start exerting yourself again.    You may use acetaminophen or ibuprofen to control pain, unless another medicine was prescribed. Use only the prescribed amount. Note: If you have chronic liver or kidney disease or have ever had a stomach ulcer or gastrointestinal bleeding, talk with your healthcare provider before using these medicines. Also talk to your provider if you are taking medicine to prevent blood clots.    During the next 3 days, it is important to take 4 slow, deep breaths every 1 to 2 hours while awake. Do this even though your chest may hurt when you breathe. It sends extra oxygen and blood to the lung. This is important to help keep the lung expanded. If an incentive spirometer (breathing exercise device) was given, use it as directed.    If you smoke or use e-cigarettes, quit. Ask your healthcare provider for help.    Avoid secondhand smoke. Don't let anyone smoke in your house or car.  Follow-up care  Follow up with your healthcare provider, or as advised, for a repeat chest X-ray to be sure the pneumothorax is  not getting larger.  Note: Any X-rays taken will be reviewed by a specialist. You will be notified of any new findings that may affect your care.  Call 911  Call 911 if any of these occur.    Trouble breathing    Confusion or difficulty arousing    Fainting or loss of consciousness    Rapid heart rate    Passing out or fainting    New pain in the chest, arm, shoulder, neck, or upper back   When to seek medical advice  Call your healthcare provider right away if any of these occur:    Increased pain with breathing    Weakness or dizziness    Fever    Coughing up sputum  Date Last Reviewed: 6/1/2018 2000-2018 [employer]. [address] [place] [address], [place] [address]. All rights reserved. This information is not intended as a substitute for professional medical care. Always follow your healthcare professional's instructions.

## 2019-03-12 NOTE — NURSING NOTE
"Initial /90 (BP Location: Right arm, Patient Position: Sitting, Cuff Size: Adult Regular)   Pulse 88   Temp 98.3  F (36.8  C) (Tympanic)   Ht 1.803 m (5' 11\")   Wt 59 kg (130 lb)   BMI 18.13 kg/m   Estimated body mass index is 18.13 kg/m  as calculated from the following:    Height as of this encounter: 1.803 m (5' 11\").    Weight as of this encounter: 59 kg (130 lb). .    Cass Carey MA    "

## 2020-01-06 ENCOUNTER — APPOINTMENT (OUTPATIENT)
Dept: GENERAL RADIOLOGY | Facility: CLINIC | Age: 35
End: 2020-01-06
Attending: FAMILY MEDICINE

## 2020-01-06 ENCOUNTER — ANESTHESIA EVENT (OUTPATIENT)
Dept: EMERGENCY MEDICINE | Facility: CLINIC | Age: 35
End: 2020-01-06

## 2020-01-06 ENCOUNTER — HOSPITAL ENCOUNTER (EMERGENCY)
Facility: CLINIC | Age: 35
Discharge: HOME | End: 2020-01-06
Attending: FAMILY MEDICINE | Admitting: FAMILY MEDICINE

## 2020-01-06 ENCOUNTER — ANESTHESIA (OUTPATIENT)
Dept: EMERGENCY MEDICINE | Facility: CLINIC | Age: 35
End: 2020-01-06

## 2020-01-06 VITALS
SYSTOLIC BLOOD PRESSURE: 100 MMHG | OXYGEN SATURATION: 100 % | HEART RATE: 92 BPM | BODY MASS INDEX: 18.13 KG/M2 | TEMPERATURE: 99.1 F | RESPIRATION RATE: 25 BRPM | WEIGHT: 130 LBS | DIASTOLIC BLOOD PRESSURE: 56 MMHG

## 2020-01-06 DIAGNOSIS — J93.83 SPONTANEOUS PNEUMOTHORAX: ICD-10-CM

## 2020-01-06 PROCEDURE — 32551 INSERTION OF CHEST TUBE: CPT | Performed by: FAMILY MEDICINE

## 2020-01-06 PROCEDURE — 32551 INSERTION OF CHEST TUBE: CPT | Mod: Z6 | Performed by: FAMILY MEDICINE

## 2020-01-06 PROCEDURE — 25000128 H RX IP 250 OP 636: Performed by: FAMILY MEDICINE

## 2020-01-06 PROCEDURE — 25000128 H RX IP 250 OP 636: Performed by: NURSE ANESTHETIST, CERTIFIED REGISTERED

## 2020-01-06 PROCEDURE — 99285 EMERGENCY DEPT VISIT HI MDM: CPT | Mod: 25 | Performed by: FAMILY MEDICINE

## 2020-01-06 PROCEDURE — 96374 THER/PROPH/DIAG INJ IV PUSH: CPT | Performed by: FAMILY MEDICINE

## 2020-01-06 PROCEDURE — 40000986 XR CHEST PORT 1 VW

## 2020-01-06 PROCEDURE — 40000671 ZZH STATISTIC ANESTHESIA CASE

## 2020-01-06 PROCEDURE — 71046 X-RAY EXAM CHEST 2 VIEWS: CPT

## 2020-01-06 RX ORDER — PROPOFOL 10 MG/ML
INJECTION, EMULSION INTRAVENOUS PRN
Status: DISCONTINUED | OUTPATIENT
Start: 2020-01-06 | End: 2020-01-06

## 2020-01-06 RX ORDER — OXYCODONE HYDROCHLORIDE 5 MG/1
5-10 TABLET ORAL EVERY 6 HOURS PRN
Qty: 12 TABLET | Refills: 0 | Status: SHIPPED | OUTPATIENT
Start: 2020-01-06 | End: 2022-07-21

## 2020-01-06 RX ORDER — HYDROMORPHONE HYDROCHLORIDE 1 MG/ML
0.5 INJECTION, SOLUTION INTRAMUSCULAR; INTRAVENOUS; SUBCUTANEOUS ONCE
Status: COMPLETED | OUTPATIENT
Start: 2020-01-06 | End: 2020-01-06

## 2020-01-06 RX ADMIN — PROPOFOL 100 MG: 10 INJECTION, EMULSION INTRAVENOUS at 18:34

## 2020-01-06 RX ADMIN — PROPOFOL 100 MG: 10 INJECTION, EMULSION INTRAVENOUS at 18:37

## 2020-01-06 RX ADMIN — PROPOFOL 50 MG: 10 INJECTION, EMULSION INTRAVENOUS at 18:38

## 2020-01-06 RX ADMIN — PROPOFOL 100 MG: 10 INJECTION, EMULSION INTRAVENOUS at 18:36

## 2020-01-06 RX ADMIN — HYDROMORPHONE HYDROCHLORIDE 0.5 MG: 1 INJECTION, SOLUTION INTRAMUSCULAR; INTRAVENOUS; SUBCUTANEOUS at 19:51

## 2020-01-06 ASSESSMENT — LIFESTYLE VARIABLES: TOBACCO_USE: 1

## 2020-01-06 NOTE — ED AVS SNAPSHOT
[facility] Emergency Department  [address] [address]  Phone:  [phone]  Fax:  [phone]                                    Mario Posada   MRN: [id number]    Department:  [facility] Emergency Department   Date of Visit:  1/6/2020           After Visit Summary Signature Page    I have received my discharge instructions, and my questions have been answered. I have discussed any challenges I see with this plan with the nurse or doctor.    ..........................................................................................................................................  Patient/Patient Representative Signature      ..........................................................................................................................................  Patient Representative Print Name and Relationship to Patient    ..................................................               ................................................  Date                                   Time    ..........................................................................................................................................  Reviewed by Signature/Title    ...................................................              ..............................................  Date                                               Time          22EPIC Rev 08/18

## 2020-01-06 NOTE — ED NOTES
Pt has hx of 3 spontaneous pneumothorax, on Thursday says he started feeling SOA, was hoping that would improve over time but is has not. R lung sounds decreased.

## 2020-01-06 NOTE — ED PROVIDER NOTES
"  HPI   The patient is a 34-year-old male presenting with right upper back pain.  Symptoms began on Thursday, 4 days ago.  His symptoms are nearly constant.  When he wakes up in the morning he does not have discomfort but then when he starts to move or cough the pain comes back.  At rest, the patient tells me the pain is mild.  Coughing elicits worsened pain.  He feels mildly short of breath, \"like I have to yawn a lot.\"  He denies any changes in behavior because of his pain or changes in breathing.  He has been coughing since Thursday as well.  His cough is not productive.  He denies having a fever.  No headache, sore throat, nausea or vomiting, diarrhea, or myalgia.  No reflux or heartburn.  No leg pain or swelling.  No trauma or injury or overuse.  He has a known history of pneumothorax on the left side x3, all spontaneous.  These started about 4 years ago.  He stopped smoking tobacco at that time.  He continues to smoke marijuana.        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 yes         Pneumothorax on left 03/10/2015     Priority: Medium      Past Medical History:    History reviewed. No pertinent past medical history.  Past Surgical History:    History reviewed. No pertinent surgical history.  Family History:    Family History   Problem Relation Age of Onset     Unknown/Adopted Father      Social History:  Marital Status:  Single [1]  Social History     Tobacco Use     Smoking status: Former Smoker     Packs/day: 0.50     Years: 9.00     Pack years: 4.50     Types: Cigarettes     Smokeless tobacco: Never Used   Substance Use Topics     Alcohol use: Yes     Drug use: Yes     Types: Marijuana      Medications:  "   EPINEPHrine (PRIMATENE MIST IN)  IBUPROFEN PO  oxyCODONE (ROXICODONE) 5 MG tablet  albuterol (PROAIR HFA/PROVENTIL HFA/VENTOLIN HFA) 108 (90 Base) MCG/ACT inhaler      Review of Systems   All other systems reviewed and are negative.      PE   BP: (!) 145/87  Pulse: 95  Heart Rate: 97  Temp: 99.1  F (37.3  C)  Resp: 16  Weight: 59 kg (130 lb)  SpO2: 100 %  Physical Exam  Vitals signs and nursing note reviewed.   Constitutional:       General: He is not in acute distress.     Appearance: He is not diaphoretic.      Comments: Thin.  No apparent distress.  Conversational.   HENT:      Head: Atraumatic.   Eyes:      General: No scleral icterus.     Pupils: Pupils are equal, round, and reactive to light.   Neck:      Musculoskeletal: Normal range of motion.   Cardiovascular:      Heart sounds: Normal heart sounds.   Pulmonary:      Effort: No respiratory distress.      Breath sounds: Normal breath sounds.   Abdominal:      General: Bowel sounds are normal.      Palpations: Abdomen is soft.      Tenderness: There is no abdominal tenderness.   Musculoskeletal: Normal range of motion.         General: No tenderness.      Comments: Tenderness as I push along the midline cervical or thoracic spine.  No tenderness along the scapula.  No tenderness along the soft tissue in his upper back.  Taking a deep breath worsens his pain.   Skin:     General: Skin is warm.      Findings: No rash.   Neurological:      Mental Status: He is alert and oriented to person, place, and time.   Psychiatric:         Behavior: Behavior normal.         ED COURSE and [facility]   1728.  The patient has pain in his right upper back.  Chest x-ray pending.    1928.  The patient tolerated the chest tube placement without difficulty.  See details of the procedure below.  Postprocedure x-ray shows resolution of the pneumothorax and proper position of the chest tube.  Follow-up in the general surgery clinic recommended.  Return here for worsening as  discussed.    LABS  Labs Ordered and Resulted from Time of ED Arrival Up to the Time of Departure from the ED - No data to display    IMAGING  Images reviewed by me.  Radiology report also reviewed.  XR Chest Port 1 View   Preliminary Result   IMPRESSION: Single portable AP view of the chest was obtained.   Significant interval decrease/resolution of the previously noted right   pneumothorax status post chest tube placement.       XR Chest 2 Views   Preliminary Result   IMPRESSION: Moderate right pneumothorax. New focal pulmonary   opacities. No significant pleural effusion or left pneumothorax.          [facility]    -Chest Tube Insertion  Date/Time: 1/6/2020 7:06 PM  Performed by: Syed Amezcua MD  Authorized by: Syed Amezcua MD       PRE-PROCEDURE DETAILS:     Skin preparation:  Alcohol and ChloraPrep  ANESTHESIA (see MAR for exact dosages):     Anesthesia method:  Local infiltration (Procedural sedation performed by the anesthetist.)    Local anesthetic:  Lidocaine 1% w/o epi    PROCEDURE DETAILS:     Placement location:  R lateral    Scalpel size:  11    Tube size (Fr):  24    Dissection instrument: dilator.    Ultrasound guidance: no      Tension pneumothorax: no      Tube connected to:  Heimlich valve    Drainage characteristics:  Air only    Suture material: 3-0 silk.    Dressing: tegaderm.    POST-PROCEDURE DETAILS:     Post-insertion x-ray findings: tube in good position    PROCEDURE   Patient Tolerance:  Patient tolerated the procedure well with no immediate complications          Medications - No data to display      IMPRESSION       ICD-10-CM    1. Spontaneous pneumothorax J93.83               Medication List      Started    oxyCODONE 5 MG tablet  Commonly known as:  ROXICODONE  5-10 mg, Oral, EVERY 6 HOURS PRN, Do not exceed 6 tablets over the course of 24 hours.        ASK your doctor about these medications    albuterol 108 (90 Base) MCG/ACT inhaler  Commonly  known as:  PROAIR HFA/PROVENTIL HFA/VENTOLIN HFA  2 puffs, Inhalation, 4 TIMES DAILY  Ask about: Which instructions should I use?                          Syed Amezcua MD  01/06/20 1933

## 2020-01-07 NOTE — ANESTHESIA PREPROCEDURE EVALUATION
Anesthesia Pre-Procedure Evaluation    Patient: Mario Posada   MRN: [id number] : 1985          Preoperative Diagnosis: * No pre-op diagnosis entered *    * No procedures listed *    History reviewed. No pertinent past medical history.  History reviewed. No pertinent surgical history.    Anesthesia Evaluation     .             ROS/MED HX    ENT/Pulmonary:     (+)tobacco use, Past use , . .    Neurologic:       Cardiovascular:         METS/Exercise Tolerance:     Hematologic:         Musculoskeletal:         GI/Hepatic:     (+) liver disease, Other GI/Hepatic ETOH      Renal/Genitourinary:         Endo:         Psychiatric:         Infectious Disease:         Malignancy:         Other:                          Physical Exam  Normal systems: cardiovascular, pulmonary and dental    Airway   Mallampati: II  TM distance: >3 FB  Neck ROM: full    Dental     Cardiovascular       Pulmonary             Lab Results   Component Value Date    WBC 10.5 2016    HGB 12.9 (L) 2016    HCT 38.8 (L) 2016     2016     2016    POTASSIUM 3.8 2016    CHLORIDE 104 2016    CO2 31 2016    BUN 1 (L) 2016    CR 0.68 2016     (H) 2016    LARY 9.4 2016    MAG 2.1 2016    ALBUMIN 3.6 2016    PROTTOTAL 7.2 2016    ALT 51 2016    AST 39 2016    ALKPHOS 85 2016    BILITOTAL 0.8 2016    LIPASE 2,370 (H) 2016    WILLIAM 18 2016    INR 1.04 2016       Preop Vitals  BP Readings from Last 3 Encounters:   20 (!) 145/87   19 129/90   19 108/70    Pulse Readings from Last 3 Encounters:   20 95   19 88   19 81      Resp Readings from Last 3 Encounters:   20 16   19 16   19 13    SpO2 Readings from Last 3 Encounters:   20 100%   19 100%   18 98%      Temp Readings from Last 1 Encounters:   20 37.3  C (99.1  F) (Temporal)    Ht  "Readings from Last 1 Encounters:   03/12/19 1.803 m (5' 11\")      Wt Readings from Last 1 Encounters:   01/06/20 59 kg (130 lb)    Estimated body mass index is 18.13 kg/m  as calculated from the following:    Height as of 3/12/19: 1.803 m (5' 11\").    Weight as of this encounter: 59 kg (130 lb).       Anesthesia Plan      History & Physical Review  History and physical reviewed and following examination; no interval change.    ASA Status:  2 .    NPO Status:  > 6 hours    Plan for General Maintenance will be TIVA.           Postoperative Care      Consents  Anesthetic plan, risks, benefits and alternatives discussed with:  Patient..                 Federico Hooper CRNA, APRN CRNA  "

## 2020-01-07 NOTE — ANESTHESIA CARE TRANSFER NOTE
Patient: Mario Posada    * No procedures listed *    Diagnosis: * No pre-op diagnosis entered *  Diagnosis Additional Information: No value filed.    Anesthesia Type:   General     Note:  Airway :Face Mask  Patient transferred to:Emergency Department  Handoff Report: Identifed the Patient, Identified the Reponsible Provider, Reviewed the pertinent medical history, Discussed the surgical course, Reviewed Intra-OP anesthesia mangement and issues during anesthesia, Set expectations for post-procedure period and Allowed opportunity for questions and acknowledgement of understanding      Vitals: (Last set prior to Anesthesia Care Transfer)    CRNA VITALS  1/6/2020 1813 - 1/6/2020 1848      1/6/2020             SpO2:  100 %    EKG:  NSR                Electronically Signed By: Federico Hooper CRNA, APRN CRNA  January 6, 2020  6:48 PM

## 2020-01-07 NOTE — DISCHARGE INSTRUCTIONS
Return to the Emergency Room if the following occurs:     Severely worsened pain, worsened breathing, fever >101, or for any concern at anytime.    Or, follow-up with the following provider as we discussed:     Return to the general surgery clinic later this week, ideally Wednesday.  Call the phone number provided and tell them you are in the ER for a spontaneous pneumothorax and that you had a chest tube placed and need it evaluated.    Medications discussed:    Ibuprofen 600 mg every six hours for pain (7 days duration).  Tylenol 1000 mg every six hours for pain (7 days duration).  Therefore, you can alternate these every three hours and do it safely.  Oxycodone 1-2 tablets every six hours as needed for pain control that is not relieved by the above.  MiraLax OTC.  Titrate the medicine to achieve a soft, easy stool every 1-2 days.    If you received pain-relieving or sedating medication during your time in the ER, avoid alcohol, driving automobiles, or working with machinery.  Also, a responsible adult must stay with you.        Call the Nurse Advice Line at [phone] or [phone] for any concern at anytime.

## 2020-01-08 ENCOUNTER — TELEPHONE (OUTPATIENT)
Dept: SURGERY | Facility: CLINIC | Age: 35
End: 2020-01-08

## 2020-01-08 NOTE — TELEPHONE ENCOUNTER
Please see request for appt and advise. No openings tomorrow.  Emily QUEEN RN BSN PHN  Specialty Clinics

## 2020-01-08 NOTE — TELEPHONE ENCOUNTER
Reason for Call:  Other appointment    Detailed comments: pt calling stating he was in the ER on 1/6 for pneumothorax and has a chest tube. He was told to f/u w/ general surgery today or tomorrow to have this recked and removed.      Phone Number Patient can be reached at: Home number on file [phone] (home)    Best Time: any     Can we leave a detailed message on this number? YES    Call taken on 1/8/2020 at 2:08 PM by Briseyda Alexis

## 2020-01-08 NOTE — TELEPHONE ENCOUNTER
Pt may be scheduled at 1:30 per Dr. Dean. Pt notified and appt made.  Emily QUEEN RN BSN PHN  Specialty Clinics

## 2020-01-09 ENCOUNTER — ANCILLARY PROCEDURE (OUTPATIENT)
Dept: GENERAL RADIOLOGY | Facility: CLINIC | Age: 35
End: 2020-01-09
Attending: SURGERY

## 2020-01-09 ENCOUNTER — OFFICE VISIT (OUTPATIENT)
Dept: SURGERY | Facility: CLINIC | Age: 35
End: 2020-01-09

## 2020-01-09 VITALS — HEART RATE: 80 BPM | TEMPERATURE: 98.3 F | DIASTOLIC BLOOD PRESSURE: 73 MMHG | SYSTOLIC BLOOD PRESSURE: 126 MMHG

## 2020-01-09 DIAGNOSIS — J93.83 SPONTANEOUS PNEUMOTHORAX: Primary | ICD-10-CM

## 2020-01-09 PROCEDURE — 99212 OFFICE O/P EST SF 10 MIN: CPT | Performed by: SURGERY

## 2020-01-09 PROCEDURE — 71046 X-RAY EXAM CHEST 2 VIEWS: CPT

## 2020-01-09 RX ORDER — OXYCODONE HYDROCHLORIDE 5 MG/1
5 TABLET ORAL EVERY 6 HOURS PRN
Qty: 12 TABLET | Refills: 0 | Status: SHIPPED | OUTPATIENT
Start: 2020-01-09 | End: 2020-01-14

## 2020-01-09 NOTE — TELEPHONE ENCOUNTER
ONCOLOGY INTAKE: Records Information      APPT INFORMATION:  Referring provider:  Edin Dean  Referring provider s clinic:  [facility]  Reason for visit/diagnosis:  Spontaneous pneumothorax  Has patient been notified of appointment date and time?: No, Clinic will notify pt    RECORDS INFORMATION:  Were the records received with the referral (via [employer])? No, internal referral    Has patient been seen for any external appt for this diagnosis? no    If yes, where? na    Has patient had any imaging or procedures outside of [facility] for this condition? no      If Yes, where? na    ADDITIONAL INFORMATION:

## 2020-01-09 NOTE — NURSING NOTE
"Initial /73   Pulse 80   Temp 98.3  F (36.8  C)  Estimated body mass index is 18.13 kg/m  as calculated from the following:    Height as of 3/12/19: 1.803 m (5' 11\").    Weight as of 1/6/20: 59 kg (130 lb). .    "

## 2020-01-09 NOTE — PROGRESS NOTES
General Surgery Progress Note    Pt returns for follow up visit s/p insertion of chest tube for spontaneous pneumothorax on the right side.  He has had 3 previous spontaneous pneumothoraces on the left.  He was referred to thoracic surgery however failed to show up.  Notes no noises from his chest tube.    Physical exam: Vitals: /73   Pulse 80   Temp 98.3  F (36.8  C)     Exam:  Constitutional: healthy, alert and no distress  Cardiovascular:  RRR. No murmurs, clicks gallops or rub  Respiratory: Percussion normal. Good diaphragmatic excursion. Lungs clear  Chest tube with minimal leak on Heimlich    CXR: Persistent right apical pneumothorax    Assessment:     ICD-10-CM    1. Spontaneous pneumothorax J93.83 XR Chest 2 Views     Plan: Mr. Carranza presents after ED chest tube placement.  He has had 3 previous pneumothoraces on the left, known apical blebs on previous CT and was referred to thoracic surgery after his previous spontaneous pneumothorax, however he did not follow up.  I counseled him this is essential for his ongoing care.  At this time, inadvisable to pull chest tube.  I will refer to thoracic surgery for further management and potential VATS/pleurodesis for recurrent spontaneous pneumothoraces.  Pain prescription given.  Patient to come to ED with any worsening symptoms.    Edin Dean, DO on 1/9/2020 at 1:26 PM

## 2020-01-09 NOTE — LETTER
1/9/2020         RE: Mario Posada  [address] [facility] [address]        Dear Colleague,    Thank you for referring your patient, Mario Posada, to the [facility]. Please see a copy of my visit note below.    General Surgery Progress Note    Pt returns for follow up visit s/p insertion of chest tube for spontaneous pneumothorax on the right side.  He has had 3 previous spontaneous pneumothoraces on the left.  He was referred to thoracic surgery however failed to show up.  Notes no noises from his chest tube.    Physical exam: Vitals: /73   Pulse 80   Temp 98.3  F (36.8  C)      Exam:  Constitutional: healthy, alert and no distress  Cardiovascular:  RRR. No murmurs, clicks gallops or rub  Respiratory: Percussion normal. Good diaphragmatic excursion. Lungs clear  Chest tube with minimal leak on Heimlich    CXR: Persistent right apical pneumothorax    Assessment:     ICD-10-CM    1. Spontaneous pneumothorax J93.83 XR Chest 2 Views     Plan: Mr. Carranza presents after ED chest tube placement.  He has had 3 previous pneumothoraces on the left, known apical blebs on previous CT and was referred to thoracic surgery after his previous spontaneous pneumothorax, however he did not follow up.  I counseled him this is essential for his ongoing care.  At this time, inadvisable to pull chest tube.  I will refer to thoracic surgery for further management and potential VATS/pleurodesis for recurrent spontaneous pneumothoraces.  Pain prescription given.  Patient to come to ED with any worsening symptoms.    Edin Dean DO on 1/9/2020 at 1:26 PM        Again, thank you for allowing me to participate in the care of your patient.        Sincerely,        Edin Dean DO

## 2020-01-10 NOTE — TELEPHONE ENCOUNTER
RECORDS STATUS - ALL OTHER DIAGNOSIS      RECORDS RECEIVED FROM: [facility] - Internal Referral   DATE RECEIVED: 1/10/20   NOTES STATUS DETAILS   OFFICE NOTE from referring provider Epic Edin Dean DO - 1/9/20   OFFICE NOTE from medical oncologist     DISCHARGE SUMMARY from hospital     DISCHARGE REPORT from the ER [facility] 1/6/20, 3/1/19, 11/2/18, 10/27/18, 10/11/18, 10/4/18, 4/6/17, 4/4/17, 3/7/15, 3/5/15   OPERATIVE REPORT [facility] 1/6/20 - R. Chest Tube Insertion  4/4/17 - Chest Tube Insertion   MEDICATION LIST [facility]    CLINICAL TRIAL TREATMENTS TO DATE     LABS     PATHOLOGY REPORTS     ANYTHING RELATED TO DIAGNOSIS     GENONOMIC TESTING     TYPE:     IMAGING (NEED IMAGES & REPORT)     XR  PACS 1/9/20, 1/6/20, 3/12/19, 3/4/19, 3/1/19, 11/2/18, 10/27/18, 2/1/18, 4/9/17, 4/6/17, 4/4/17   CT SCANS     MRI     MAMMO     ULTRASOUND     PET

## 2020-01-14 ENCOUNTER — PRE VISIT (OUTPATIENT)
Dept: ONCOLOGY | Facility: CLINIC | Age: 35
End: 2020-01-14

## 2020-01-14 ENCOUNTER — ANCILLARY PROCEDURE (OUTPATIENT)
Dept: GENERAL RADIOLOGY | Facility: CLINIC | Age: 35
End: 2020-01-14
Attending: CLINICAL NURSE SPECIALIST

## 2020-01-14 ENCOUNTER — OFFICE VISIT (OUTPATIENT)
Dept: SURGERY | Facility: CLINIC | Age: 35
End: 2020-01-14
Attending: SURGERY

## 2020-01-14 ENCOUNTER — PRE VISIT (OUTPATIENT)
Dept: SURGERY | Facility: CLINIC | Age: 35
End: 2020-01-14

## 2020-01-14 ENCOUNTER — PREP FOR PROCEDURE (OUTPATIENT)
Dept: SURGERY | Facility: CLINIC | Age: 35
End: 2020-01-14

## 2020-01-14 VITALS
HEART RATE: 93 BPM | OXYGEN SATURATION: 99 % | WEIGHT: 128 LBS | RESPIRATION RATE: 14 BRPM | BODY MASS INDEX: 18.32 KG/M2 | DIASTOLIC BLOOD PRESSURE: 88 MMHG | SYSTOLIC BLOOD PRESSURE: 136 MMHG | TEMPERATURE: 98.2 F | HEIGHT: 70 IN

## 2020-01-14 DIAGNOSIS — J93.9 PNEUMOTHORAX: ICD-10-CM

## 2020-01-14 DIAGNOSIS — J93.9 PNEUMOTHORAX: Primary | ICD-10-CM

## 2020-01-14 DIAGNOSIS — J93.9 PNEUMOTHORAX ON RIGHT: Primary | ICD-10-CM

## 2020-01-14 PROCEDURE — G0463 HOSPITAL OUTPT CLINIC VISIT: HCPCS | Mod: ZF

## 2020-01-14 PROCEDURE — 99203 OFFICE O/P NEW LOW 30 MIN: CPT | Mod: ZP | Performed by: THORACIC SURGERY (CARDIOTHORACIC VASCULAR SURGERY)

## 2020-01-14 RX ORDER — CEFAZOLIN SODIUM 1 G/50ML
1 INJECTION, SOLUTION INTRAVENOUS SEE ADMIN INSTRUCTIONS
Status: CANCELLED | OUTPATIENT
Start: 2020-01-14

## 2020-01-14 RX ORDER — CEFAZOLIN SODIUM 2 G/50ML
2 SOLUTION INTRAVENOUS
Status: CANCELLED | OUTPATIENT
Start: 2020-01-14

## 2020-01-14 ASSESSMENT — PAIN SCALES - GENERAL: PAINLEVEL: MILD PAIN (3)

## 2020-01-14 ASSESSMENT — MIFFLIN-ST. JEOR: SCORE: 1535.6

## 2020-01-14 NOTE — NURSING NOTE
"Oncology Rooming Note    January 14, 2020 5:24 PM   Mario Posada is a 34 year old male who presents for:    Chief Complaint   Patient presents with     Oncology Clinic Visit     New; Spontaneous Pneumothorax     Initial Vitals: BP (!) 149/88   Pulse 93   Temp 98.2  F (36.8  C) (Oral)   Resp 14   Ht 1.776 m (5' 9.92\")   Wt 58.1 kg (128 lb)   SpO2 99%   BMI 18.41 kg/m   Estimated body mass index is 18.41 kg/m  as calculated from the following:    Height as of this encounter: 1.776 m (5' 9.92\").    Weight as of this encounter: 58.1 kg (128 lb). Body surface area is 1.69 meters squared.  Mild Pain (3) Comment: chest tube   No LMP for male patient.  Allergies reviewed: Yes  Medications reviewed: Yes    Medications: Medication refills not needed today.  Pharmacy name entered into [facility]: [facility] - [place], [place] - [address]    Clinical concerns: Spontaneous Pneumothorax       Barbie Mitchell CMA              "

## 2020-01-14 NOTE — LETTER
1/14/2020       RE: Mario Posada  [address] [facility] [address]     Dear Colleague,    Thank you for referring your patient, Mario Posada, to the [facility]. Please see a copy of my visit note below.    THORACIC SURGERY - NEW PATIENT OFFICE VISIT      Dear Dr. Dean,    I saw Mr. Posada in consultation for the evaluation and treatment of a spontaneous pneumothorax.     HPI  Mr. Mario Posada is a 34 year old man who is referred for evaluation after a recurrent right pneumothorax.  He has multiple secondary spontaneous pneumothoraces.  He was seen in an ED on 1/6/20 with a right sided spontaneous pneumothorax. A chest tube was placed and he was discharged to home with the tube.  A CXR three days later demonstrated a persistent small apical pneumothorax, so the tube was left in place and he was referred to thoracic clinic.  This was his fourth pneumothorax,  He has had approximately one per year for the past 4 years.   The first 3 were left sided, this is the first right sided pneumothorax.  The last one was about one year ago.  His presenting symptoms are usually shoulder and upper back pain.  He doesn't feel SOB when he has a pneumothorax and his O2 saturation is always 100%.  He currently denies pain or SOB.    Previsit Tests   CT Chest 3/12/15:    Bilateral apical subpleural blebs  CXR 1/6/20:    Moderate R pnx  CXR 1/9/20:    Small R apical pneumothorax    CXR 1/14/20 (Post-pull):    PMH  Recurrent pneumothoraces      ETOH: 6 drinks/day 5-6 days/week  TOB: former smoker, quit 4 years ago.  He smoked 1 pack per day for 11 years.  He used to smoke marijuana, daily from age 19 until a few months ago.  He does not vape.      Physical examination  BMI 18.4  NAD, thin, poor dentition  Breathing nonlabored, right sided chest tube in place to a Heimlich valve.  There is a small amount of serous drainage in tube.  No air leak    From a personal perspective, Mr. Posada lives with his  parents and works in a kitchen in a cafe.  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      IMPRESSION (J93.9) Pneumothorax  (primary encounter diagnosis)  Plan: X-ray Chest 2 vws*     This is a 34 year old man with a resolved right pneumothorax.  He has had 4 secondary spontaneous pneumothoraces and would benefit from bullectomy and pleurodesis, possibly on both sides.      PLAN  I spent a total of 30 minutes with Mr. Posada and his friend, more than 50% of which were spent in counseling, coordination of care, and face-to-face time. I reviewed the plan as follows:  - Chest tube removed, CXR without recurrent pneumothorax.  I recommend consideration of VATS bullectomy and pleurodesis.  The patient currently is waiting for his employer-based insurance to start and will consider returning for surgery after that.  They had all their questions answered and were in agreement with the plan.  I appreciate the opportunity to participate in the care of your patient and will keep you updated.  Sincerely,    Tim Whitt

## 2020-01-14 NOTE — PROGRESS NOTES
THORACIC SURGERY - NEW PATIENT OFFICE VISIT      Dear Dr. Dean,    I saw Mr. Posada in consultation for the evaluation and treatment of a spontaneous pneumothorax.     HPI  Mr. Mario Posada is a 34 year old man who is referred for evaluation after a recurrent right pneumothorax.  He has multiple secondary spontaneous pneumothoraces.  He was seen in an ED on 1/6/20 with a right sided spontaneous pneumothorax. A chest tube was placed and he was discharged to home with the tube.  A CXR three days later demonstrated a persistent small apical pneumothorax, so the tube was left in place and he was referred to thoracic clinic.  This was his fourth pneumothorax,  He has had approximately one per year for the past 4 years.   The first 3 were left sided, this is the first right sided pneumothorax.  The last one was about one year ago.  His presenting symptoms are usually shoulder and upper back pain.  He doesn't feel SOB when he has a pneumothorax and his O2 saturation is always 100%.  He currently denies pain or SOB.    Previsit Tests   CT Chest 3/12/15:    Bilateral apical subpleural blebs  CXR 1/6/20:    Moderate R pnx  CXR 1/9/20:    Small R apical pneumothorax    CXR 1/14/20 (Post-pull):    PMH  Recurrent pneumothoraces      ETOH: 6 drinks/day 5-6 days/week  TOB: former smoker, quit 4 years ago.  He smoked 1 pack per day for 11 years.  He used to smoke marijuana, daily from age 19 until a few months ago.  He does not vape.      Physical examination  BMI 18.4  NAD, thin, poor dentition  Breathing nonlabored, right sided chest tube in place to a Heimlich valve.  There is a small amount of serous drainage in tube.  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      IMPRESSION (J93.9) Pneumothorax  (primary encounter diagnosis)  Plan: X-ray Chest 2 vws*     This is a 34 year old man with a resolved right pneumothorax.   He has had 4 secondary spontaneous pneumothoraces and would benefit from bullectomy and pleurodesis, possibly on both sides.        PLAN  I spent a total of 30 minutes with Mr. Posada and his friend, more than 50% of which were spent in counseling, coordination of care, and face-to-face time. I reviewed the plan as follows:    - Chest tube removed, CXR without recurrent pneumothorax.  I recommend consideration of VATS bullectomy and pleurodesis.  The patient currently is waiting for his employer-based insurance to start and will consider returning for surgery after that.  They had all their questions answered and were in agreement with the plan.  I appreciate the opportunity to participate in the care of your patient and will keep you updated.  Sincerely,    Tim Whitt

## 2020-01-15 NOTE — PATIENT INSTRUCTIONS
In emergencies, call [phone]      For other Questions or Concerns;   A.) During weekday working hours (Monday through Friday 8am to 4:30pm)   call [phone] and ask to speak to a clinical nurse specialist.     B.) At nights (after 4:30pm), on weekends, or if urgent call [phone] and   tell the   I would like to page job code [phone], the thoracic surgery   fellow on call, please.

## 2021-05-31 ENCOUNTER — RECORDS - HEALTHEAST (OUTPATIENT)
Dept: ADMINISTRATIVE | Facility: CLINIC | Age: 36
End: 2021-05-31

## 2021-06-10 ENCOUNTER — HOSPITAL ENCOUNTER (EMERGENCY)
Facility: CLINIC | Age: 36
Discharge: HOME | End: 2021-06-10
Attending: PHYSICIAN ASSISTANT | Admitting: PHYSICIAN ASSISTANT
Payer: OTHER GOVERNMENT

## 2021-06-10 VITALS
DIASTOLIC BLOOD PRESSURE: 87 MMHG | HEIGHT: 71 IN | TEMPERATURE: 98.1 F | SYSTOLIC BLOOD PRESSURE: 128 MMHG | OXYGEN SATURATION: 98 % | HEART RATE: 80 BPM | BODY MASS INDEX: 18.2 KG/M2 | WEIGHT: 130 LBS | RESPIRATION RATE: 18 BRPM

## 2021-06-10 DIAGNOSIS — K04.7 DENTAL ABSCESS: ICD-10-CM

## 2021-06-10 DIAGNOSIS — K08.89 PAIN, DENTAL: ICD-10-CM

## 2021-06-10 LAB
LABORATORY COMMENT REPORT: NORMAL
SARS-COV-2 RNA RESP QL NAA+PROBE: NEGATIVE
SPECIMEN SOURCE: NORMAL

## 2021-06-10 PROCEDURE — C9803 HOPD COVID-19 SPEC COLLECT: HCPCS | Performed by: PHYSICIAN ASSISTANT

## 2021-06-10 PROCEDURE — 87635 SARS-COV-2 COVID-19 AMP PRB: CPT | Performed by: PHYSICIAN ASSISTANT

## 2021-06-10 PROCEDURE — 99213 OFFICE O/P EST LOW 20 MIN: CPT | Performed by: PHYSICIAN ASSISTANT

## 2021-06-10 PROCEDURE — G0463 HOSPITAL OUTPT CLINIC VISIT: HCPCS | Performed by: PHYSICIAN ASSISTANT

## 2021-06-10 ASSESSMENT — ENCOUNTER SYMPTOMS
RHINORRHEA: 1
SHORTNESS OF BREATH: 0
EYE DISCHARGE: 0
SINUS CONGESTION: 0
SINUS PRESSURE: 0
ADENOPATHY: 0
CHEST TIGHTNESS: 0
TROUBLE SWALLOWING: 0
COUGH: 1
CHOKING: 0
WHEEZING: 0
FACIAL SWELLING: 0
APNEA: 0
HEADACHES: 1
STRIDOR: 0
VOICE CHANGE: 1
SORE THROAT: 1
FEVER: 0
NIGHT SWEATS: 0
CARDIOVASCULAR NEGATIVE: 1
ABDOMINAL PAIN: 0
CHILLS: 0
NECK STIFFNESS: 0

## 2021-06-10 ASSESSMENT — MIFFLIN-ST. JEOR: SCORE: 1556.81

## 2021-06-10 NOTE — DISCHARGE INSTRUCTIONS
Recommend Zyrtec for postnasal drainage, recommend Afrin x3 days and Flonase for nasal congestion.    Patient instructed regarding symptom relief measures for a sore throat includin. Ibuprofen/acetaminophen for pain, do not use if you have ever been told by your primary provider that you should avoid this medication due to another condition.  2. Salt water gargle  3. Chloraseptic spray or Cepacol drops  4. Cough drops  5. Warm tea with honey    Patient was advised to follow up with primary care physician if symptoms are not improving over the next 1 week, or more urgently if develops new symptoms or significantly worsens.  Questions were answered, patient verbalized understanding and agrees with the treatment plan.    Recommend remaining out of work until cough/cold symptoms resolve completely plus an additional 24 hours symptom-free without the need for pain or fever reducing medications.

## 2021-06-10 NOTE — ED PROVIDER NOTES
History     Chief Complaint   Patient presents with     Dental Pain     dental pain, HA, cough and had fever 99.1  last evening     Cough       Pharyngitis  Location:  Posterior  Onset quality:  Sudden  Duration:  1 day  Timing:  Constant  Progression:  Unchanged  Chronicity:  New  Associated symptoms: cough, headaches, postnasal drip, rhinorrhea and voice change    Associated symptoms: no abdominal pain, no adenopathy, no chest pain, no chills, no drooling, no ear discharge, no ear pain, no epistaxis, no eye discharge, no fever, no neck stiffness, no night sweats, no plugged ear sensation, no rash, no shortness of breath, no sinus congestion, no stridor and no trouble swallowing      Mario Posada is a 35 year old male with a past medical history of dental abscesses and spontaneous pneumothorax, alcohol abuse with pancreatitis who is presenting with tooth pain at the base of the second molar right side, especially on the upper jaw.  He has had this pain for the past 3 days, pain has been worsening to the point where he has not been able to sleep much at night. He has been taking ibuprofen 800 mg every 8 hours for the past 3 days.  He was able to drain some purulent fluid out of the affected area about 2 days ago. Also has a headache which is well managed with ibuprofen.  Denies any vision changes or pulsation in the temporal region.  No nausea or vomiting, no numbness or tingling.  He has been seen and evaluated multiple times for dental pain and abscesses in the past, has been treated with penicillin on multiple occasions.  Last occurrence was in 2017 at which time he was treated with clindamycin.    Also has URI symptoms which developed yesterday including sinus congestion, runny nose, mild headache, sore throat which he feels is due to postnasal drip, and nonproductive cough.  He has not taken any medications for symptomatic relief of URI symptoms.  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  He has not been vaccinated for Covid-19 previously, has not had COVID-19 in the past.    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 yes         Pneumothorax on left 03/10/2015     Priority: Medium     Tobacco use disorder 09/20/2005     Priority: Medium        Past Medical History:    History reviewed. No pertinent past medical history.    Past Surgical History:    History reviewed. No pertinent surgical history.    Family History:    Family History   Problem Relation Age of Onset     Unknown/Adopted Father        Social History:  Marital Status:  Single [1]  Social History     Tobacco Use     Smoking status: Former Smoker     Packs/day: 0.50     Years: 9.00     Pack years: 4.50     Types: Cigarettes     Smokeless tobacco: Never Used   Substance Use Topics     Alcohol use: Yes     Drug use: Yes     Types: Marijuana        Medications:    EPINEPHrine (PRIMATENE MIST IN)  IBUPROFEN PO  oxyCODONE (ROXICODONE) 5 MG tablet          Review of Systems   Constitutional: Negative for chills, fever and night sweats.   HENT: Positive for congestion, dental problem, postnasal drip, rhinorrhea, sore throat and voice change. Negative for drooling, ear discharge, ear pain, facial swelling, hearing loss, mouth sores, nosebleeds, sinus pressure and trouble swallowing.    Eyes: Negative for discharge.   Respiratory: Positive for cough. Negative for apnea, choking, chest tightness, shortness of breath, wheezing and stridor.    Cardiovascular: Negative.  Negative for chest pain.   Gastrointestinal: Negative for abdominal pain.   Musculoskeletal: Negative for neck stiffness.   Skin: Negative for rash.  "  Neurological: Positive for headaches.   Hematological: Negative for adenopathy.       Physical Exam   BP: 128/87  Pulse: 80  Temp: 98.1  F (36.7  C)  Resp: 18  Height: 180.3 cm (5' 11\")  Weight: 59 kg (130 lb)  SpO2: 98 %      Physical Exam  Constitutional:       General: He is not in acute distress.     Appearance: Normal appearance. He is not ill-appearing, toxic-appearing or diaphoretic.   HENT:      Head: Normocephalic and atraumatic.      Right Ear: Tympanic membrane, ear canal and external ear normal. There is no impacted cerumen.      Left Ear: Tympanic membrane, ear canal and external ear normal. There is no impacted cerumen.      Nose: Rhinorrhea present. No congestion.      Mouth/Throat:      Mouth: Mucous membranes are moist.      Dentition: Abnormal dentition. Does not have dentures. Dental tenderness, gingival swelling, dental caries and dental abscesses present. No gum lesions.      Tongue: No lesions. Tongue does not deviate from midline.      Pharynx: Posterior oropharyngeal erythema present. No oropharyngeal exudate or uvula swelling.      Tonsils: No tonsillar exudate or tonsillar abscesses.        Comments: Has caries on most every tooth on his upper and lower jaw.  Eyes:      General:         Right eye: No discharge.         Left eye: No discharge.      Extraocular Movements: Extraocular movements intact.      Conjunctiva/sclera: Conjunctivae normal.   Neck:      Musculoskeletal: Normal range of motion and neck supple. No neck rigidity or muscular tenderness.   Cardiovascular:      Rate and Rhythm: Normal rate and regular rhythm.      Pulses: Normal pulses.      Heart sounds: Normal heart sounds. No murmur.   Pulmonary:      Effort: Pulmonary effort is normal. No respiratory distress.      Breath sounds: Normal breath sounds. No stridor. No wheezing, rhonchi or rales.   Chest:      Chest wall: No tenderness.   Lymphadenopathy:      Cervical: Cervical adenopathy present.      Right cervical: " Superficial cervical adenopathy present.      Left cervical: Superficial cervical adenopathy present.   Skin:     General: Skin is warm and dry.      Findings: No erythema or rash.   Neurological:      General: No focal deficit present.      Mental Status: He is alert and oriented to person, place, and time.      Sensory: No sensory deficit.      Motor: No weakness.      Coordination: Coordination normal.      Gait: Gait normal.   Psychiatric:         Mood and Affect: Mood normal.         Behavior: Behavior normal.         Thought Content: Thought content normal.         Judgment: Judgment normal.         ED Course        Procedures               No results found for this or any previous visit (from the past 24 hour(s)).    Medications - No data to display    Assessments & Plan (with Medical Decision Making)   The patient is a 35-year-old male with a past medical history of dental abscesses and spontaneous pneumothorax, alcohol abuse with pancreatitis  presenting to urgent care with dental pain which began 3 to 4 days ago.  The pain is felt primarily in the second molar right upper jaw.  Patient was able to drain some purulent fluid from the gum of the affected tooth a few days ago.  He has been taking ibuprofen 800 mg 3 times per day for relief of pain.  Physical exam reveals dental caries and gingivitis almost every tooth in his mouth.  Has dental tenderness and gingival swelling over the gums beneath the second molar, right upper jaw and the second molar lower jaw.  Has been treated multiple times in the past for dental abscesses, most of the time with penicillin, most recently in 2017 with clindamycin.    On exam the patient has dental caries on all his teeth.  Dentition is very poor.  Has no fluctuance in the gums.  However per his report he was able to drain an abscess below the second molar of the right upper jaw just a few days ago.  I am starting him on a 10-day course of Augmentin treatment of dental  abscess.    Advised him to return to clinic for urgent medical evaluation if he developed worsening pain and tenderness in the jaws/gums, fever/chills, worsening tooth pain, or redness/tenderness/swelling in the face.    Seen for COVID-19 was performed in clinic today given the patient has URI symptoms, results were negative.    Pt having symptoms of a viral URI. Symptomatic cares discussed including: pushing fluids, rest, OTC cold medication such as Zyrtec. For the sore throat patient can use salt water gargles, cough drops, chloraseptic spray/drops and tylenol/ibuprofen. Follow up with PCP if no improvement in 1 week, sooner if there are new or worsening symptoms such as fever of 104 degrees F or greater, chest tightness, wheezing, facial pressure, headaches, trouble breathing, trouble swallowing, or severe abdominal pain. Pt/guardian verbalized understanding and agrees with the treatment plan.        I have reviewed the nursing notes.    I have reviewed the findings, diagnosis, plan and need for follow up with the patient.    New Prescriptions    No medications on file       Final diagnoses:   None       6/10/2021   [facility] EMERGENCY DEPT     Lamin Adams PA-C  06/10/21 [id number]

## 2022-07-17 ENCOUNTER — ANESTHESIA EVENT (OUTPATIENT)
Dept: ANESTHESIOLOGY | Facility: CLINIC | Age: 37
End: 2022-07-17

## 2022-07-17 ENCOUNTER — ANESTHESIA (OUTPATIENT)
Dept: ANESTHESIOLOGY | Facility: CLINIC | Age: 37
End: 2022-07-17

## 2022-07-17 ENCOUNTER — APPOINTMENT (OUTPATIENT)
Dept: GENERAL RADIOLOGY | Facility: CLINIC | Age: 37
End: 2022-07-17
Attending: EMERGENCY MEDICINE

## 2022-07-17 ENCOUNTER — HOSPITAL ENCOUNTER (EMERGENCY)
Facility: CLINIC | Age: 37
Discharge: HOME | End: 2022-07-18
Attending: EMERGENCY MEDICINE | Admitting: EMERGENCY MEDICINE

## 2022-07-17 DIAGNOSIS — R06.02 SOB (SHORTNESS OF BREATH): ICD-10-CM

## 2022-07-17 DIAGNOSIS — J93.11 PRIMARY SPONTANEOUS PNEUMOTHORAX: ICD-10-CM

## 2022-07-17 LAB — RADIOLOGIST FLAGS: ABNORMAL

## 2022-07-17 PROCEDURE — 71046 X-RAY EXAM CHEST 2 VIEWS: CPT

## 2022-07-17 PROCEDURE — 99284 EMERGENCY DEPT VISIT MOD MDM: CPT | Performed by: EMERGENCY MEDICINE

## 2022-07-17 PROCEDURE — 370N000003 HC ANESTHESIA WARD SERVICE

## 2022-07-17 PROCEDURE — 99284 EMERGENCY DEPT VISIT MOD MDM: CPT | Mod: 25 | Performed by: EMERGENCY MEDICINE

## 2022-07-17 PROCEDURE — 96360 HYDRATION IV INFUSION INIT: CPT | Performed by: EMERGENCY MEDICINE

## 2022-07-17 RX ORDER — SODIUM CHLORIDE 9 MG/ML
INJECTION, SOLUTION INTRAVENOUS CONTINUOUS
Status: DISCONTINUED | OUTPATIENT
Start: 2022-07-17 | End: 2022-07-18 | Stop reason: HOSPADM

## 2022-07-17 ASSESSMENT — ENCOUNTER SYMPTOMS
SHORTNESS OF BREATH: 1
ABDOMINAL PAIN: 0
FEVER: 0

## 2022-07-18 ENCOUNTER — APPOINTMENT (OUTPATIENT)
Dept: GENERAL RADIOLOGY | Facility: CLINIC | Age: 37
End: 2022-07-18
Attending: EMERGENCY MEDICINE

## 2022-07-18 VITALS
TEMPERATURE: 98.8 F | OXYGEN SATURATION: 100 % | DIASTOLIC BLOOD PRESSURE: 74 MMHG | WEIGHT: 130 LBS | HEIGHT: 71 IN | SYSTOLIC BLOOD PRESSURE: 121 MMHG | BODY MASS INDEX: 18.2 KG/M2 | RESPIRATION RATE: 7 BRPM | HEART RATE: 81 BPM

## 2022-07-18 PROCEDURE — 250N000011 HC RX IP 250 OP 636: Performed by: NURSE ANESTHETIST, CERTIFIED REGISTERED

## 2022-07-18 PROCEDURE — 250N000009 HC RX 250: Performed by: NURSE ANESTHETIST, CERTIFIED REGISTERED

## 2022-07-18 PROCEDURE — 258N000003 HC RX IP 258 OP 636: Performed by: EMERGENCY MEDICINE

## 2022-07-18 PROCEDURE — 999N000065 XR CHEST PORT 1 VIEW

## 2022-07-18 RX ORDER — LIDOCAINE HYDROCHLORIDE 10 MG/ML
INJECTION, SOLUTION INFILTRATION; PERINEURAL PRN
Status: DISCONTINUED | OUTPATIENT
Start: 2022-07-18 | End: 2022-07-18

## 2022-07-18 RX ORDER — PROPOFOL 10 MG/ML
INJECTION, EMULSION INTRAVENOUS PRN
Status: DISCONTINUED | OUTPATIENT
Start: 2022-07-18 | End: 2022-07-18

## 2022-07-18 RX ORDER — KETAMINE HYDROCHLORIDE 10 MG/ML
INJECTION INTRAMUSCULAR; INTRAVENOUS PRN
Status: DISCONTINUED | OUTPATIENT
Start: 2022-07-18 | End: 2022-07-18

## 2022-07-18 RX ORDER — OXYCODONE AND ACETAMINOPHEN 5; 325 MG/1; MG/1
1 TABLET ORAL EVERY 6 HOURS PRN
Qty: 6 TABLET | Refills: 0 | Status: SHIPPED | OUTPATIENT
Start: 2022-07-18

## 2022-07-18 RX ADMIN — PROPOFOL 50 MG: 10 INJECTION, EMULSION INTRAVENOUS at 00:06

## 2022-07-18 RX ADMIN — KETAMINE HYDROCHLORIDE 10 MG: 10 INJECTION, SOLUTION INTRAMUSCULAR; INTRAVENOUS at 00:13

## 2022-07-18 RX ADMIN — PROPOFOL 50 MG: 10 INJECTION, EMULSION INTRAVENOUS at 00:10

## 2022-07-18 RX ADMIN — PROPOFOL 50 MG: 10 INJECTION, EMULSION INTRAVENOUS at 00:23

## 2022-07-18 RX ADMIN — KETAMINE HYDROCHLORIDE 10 MG: 10 INJECTION, SOLUTION INTRAMUSCULAR; INTRAVENOUS at 00:10

## 2022-07-18 RX ADMIN — LIDOCAINE HYDROCHLORIDE 30 MG: 10 INJECTION, SOLUTION INFILTRATION; PERINEURAL at 00:06

## 2022-07-18 RX ADMIN — PROPOFOL 50 MG: 10 INJECTION, EMULSION INTRAVENOUS at 00:19

## 2022-07-18 RX ADMIN — SODIUM CHLORIDE: 9 INJECTION, SOLUTION INTRAVENOUS at 00:06

## 2022-07-18 RX ADMIN — PROPOFOL 50 MG: 10 INJECTION, EMULSION INTRAVENOUS at 00:13

## 2022-07-18 RX ADMIN — PROPOFOL 50 MG: 10 INJECTION, EMULSION INTRAVENOUS at 00:21

## 2022-07-18 RX ADMIN — PROPOFOL 50 MG: 10 INJECTION, EMULSION INTRAVENOUS at 00:17

## 2022-07-18 RX ADMIN — KETAMINE HYDROCHLORIDE 10 MG: 10 INJECTION, SOLUTION INTRAMUSCULAR; INTRAVENOUS at 00:19

## 2022-07-18 NOTE — ED PROVIDER NOTES
History     Chief Complaint   Patient presents with     Cough     HPI  Mario Posada is a 36 year old male who presents to the emergency department with concerns regarding right-sided chest pain.  Patient also with some mild amounts of shortness of breath.  He has had increased amounts of right-sided chest pain, with cough which has been present.  Symptoms similar to prior episodes when patient has had pneumothorax.  Patient has had 3 pneumothoraces on the left side.  He has had 1 prior right-sided pneumothorax.  No abdominal pain.  Denies any severe shortness of breath.  Patient rested over the past 2 weeks, thinking that his symptoms would improve, however they have not improved, and therefore patient presents to the emergency department for further evaluation.    Chart records are reviewed.  Patient was last seen in the emergency department on January 6, 2021 patient had spontaneous pneumothorax of the right chest.  Had chest tube placed.  Subsequently followed up in surgery clinic, and ultimately had cardiothoracic surgery follow-up.  Recommendation has been for bullectomy and pleurodesis procedure, however patient does not have insurance, and therefore he has deferred on that procedure up to this point.      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 yes         Pneumothorax on left 03/10/2015     Priority: Medium     Tobacco use disorder 09/20/2005     Priority: Medium        Past Medical History:    No past medical history on file.    Past Surgical History:    No past surgical history on file.    Family History:    Family History   Problem Relation Age of Onset     Unknown/Adopted Father        Social  "History:  Marital Status:  Single [1]  Social History     Tobacco Use     Smoking status: Former Smoker     Packs/day: 0.50     Years: 9.00     Pack years: 4.50     Types: Cigarettes     Smokeless tobacco: Never Used   Substance Use Topics     Alcohol use: Yes     Drug use: Yes     Types: Marijuana        Medications:    oxyCODONE-acetaminophen (PERCOCET) 5-325 MG tablet  EPINEPHrine (PRIMATENE MIST IN)  IBUPROFEN PO  oxyCODONE (ROXICODONE) 5 MG tablet          Review of Systems   Constitutional: Negative for fever.   Respiratory: Positive for shortness of breath.    Cardiovascular: Negative for chest pain.   Gastrointestinal: Negative for abdominal pain.   All other systems reviewed and are negative.      Physical Exam   BP: (!) 154/89  Pulse: 94  Temp: 98.8  F (37.1  C)  Resp: 16  Height: 180.3 cm (5' 11\")  Weight: 59 kg (130 lb)  SpO2: 99 %      Physical Exam  /74   Pulse 81   Temp 98.8  F (37.1  C) (Tympanic)   Resp (!) 7   Ht 1.803 m (5' 11\")   Wt 59 kg (130 lb)   SpO2 100%   BMI 18.13 kg/m    General: alert, interactive, in no apparent distress  Head: atraumatic  Nose: no rhinorrhea or epistaxis  Ears: no external auditory canal discharge or bleeding.    Eyes: Sclera nonicteric. Conjunctiva noninjected. PERRL, EOMI  Mouth: no tonsillar erythema, edema, or exudate  Neck: supple, no palp LAD  Lungs: CTAB  CV: RRR, S1/S2; peripheral pulses palpable and symmetric  Abdomen: soft, nt, nd, no guarding or rebound. Positive bowel sounds  Extremities: no cyanosis or edema  Skin: no rash or diaphoresis  Neuro: CN II-XII grossly intact, strength 5/5 in UE and LEs bilaterally, sensation intact to light touch in UE and LEs bilaterally;       [facility]    -Chest Tube Insertion    Date/Time: 7/18/2022 2:45 AM  Performed by: Nigel Benitez MD  Authorized by: Nigel Benitez MD     Risks, benefits and alternatives discussed.      PRE-PROCEDURE " DETAILS:     Skin preparation:  ChloraPrep    ANESTHESIA (see MAR for exact dosages):     Anesthesia method: see MAR.      PROCEDURE DETAILS:     Placement location:  R lateral    Tube size (Fr):  8 (did not insert)    Ultrasound guidance: no      Tension pneumothorax: no      PROCEDURE  Describe Procedure: Attempted to insert needle into the right fourth intercostal space of the anterior axillary line.  However, with attempts to insert the needle, did not get much for air returning.  Attempted to reposition slightly, with negative aspiration.  Therefore, I do not feel that further attempts are indicated at this point.  Patient only had very small pneumothorax.  Procedure was discontinued, and no chest tube was ever inserted.                Critical Care time:  none               Results for orders placed or performed during the hospital encounter of 07/17/22 (from the past 24 hour(s))   Chest XR,  PA & LAT   Result Value Ref Range    Radiologist flags Pneumothorax (Urgent)     Narrative    EXAM: XR CHEST 2 VW  LOCATION: [facility]  DATE/TIME: 7/17/2022 10:33 PM    INDICATION: h o prior pneumothoraces.  right chest pain with SOB  COMPARISON: 01/14/2020      Impression    IMPRESSION: There is a small right pneumothorax measuring 1.5 cm at the apex. No evidence for mediastinal shift. Lungs are clear. No pleural effusion. Normal heart size.      [Urgent Result: Pneumothorax]    Finding was identified on 7/17/2022 10:46 PM.     Dr. Benitez was contacted by me on 7/17/2022 10:53 PM and verbalized understanding of the critical result.     XR Chest Port 1 View    Narrative    EXAM: XR CHEST PORT 1 VIEW  LOCATION: [facility]  DATE/TIME: 7/18/2022 12:23 AM    INDICATION: Chest tube placement  COMPARISON: 7/17/2022.    FINDINGS: The heart size is normal. The lungs are clear. There is again a small right pneumothorax measuring approximately 10 mm at the apex.       Impression    IMPRESSION: Persistent small right pneumothorax.       Medications   sodium chloride (PF) 0.9% PF flush 3 mL (has no administration in time range)   sodium chloride (PF) 0.9% PF flush 3 mL (has no administration in time range)   sodium chloride 0.9% infusion (0 mLs Intravenous Stopped 7/18/22 0116)       Assessments & Plan (with Medical Decision Making)  36 year old male who has past medical history significant for prior episodes of pneumothoraces, with 3 on the left, and 1 prior pneumothorax of the right lung, who now presents to the emergency department with approximately 2 weeks of intermittent cough, shortness of breath, in addition to pain of the right upper scapular region.  His symptoms are similar to prior episodes when he has had a pneumothorax.  Patient with symptoms over the past 2 weeks that have been pretty much constant, unchanged.  Patient has been resting at home, thinking that his symptoms would improve with rest.  However, they have not improved.  His symptoms are similar to prior episodes of pneumothorax.  Chest x-ray is obtained and shows small, right-sided pneumothorax.  This measures approximately 1.5 cm at the apex of the right lung.  Images personally reviewed in addition to radiology interpretation.  I feel that his pneumothorax has likely been present over the past 2 weeks.  He has not had severe worsening, or significant improvement of the pain.  Therefore, after further discussion with the patient, he wishes to proceed with chest tube.  I discussed with patient that it would likely resolve over time.  However, patient wishes to have more immediate relief.  Therefore, consent obtained, and patient with attempted chest tube procedure on the right with procedural sedation.  Unable to aspirate air from the right anterior chest wall, and therefore decision made to terminate procedure.  Repeat chest x-ray shows ongoing right-sided pneumothorax, with approximately 1 cm distance at  the apex.  Patient will be referred to cardiothoracic surgery, whom he has followed up with previously.  Likely has bleb, and potential that I was unable to aspirate on the lateral aspect secondary to the majority of the air being anterior and superior.  Regardless, patient is with oxygen saturations of 100% on room air, minimal pneumothorax, which should resolve on its own, and patient encouraged follow-up in clinic this week and return if worsening symptoms.      I have reviewed the nursing notes.    I have reviewed the findings, diagnosis, plan and need for follow up with the patient.       Discharge Medication List as of 7/18/2022  1:18 AM      START taking these medications    Details   oxyCODONE-acetaminophen (PERCOCET) 5-325 MG tablet Take 1 tablet by mouth every 6 hours as needed for severe pain, Disp-6 tablet, R-0, [employer]             Final diagnoses:   Primary spontaneous pneumothorax   SOB (shortness of breath)       7/17/2022   [facility] EMERGENCY DEPT     Nigel Benitez MD  07/18/22 0253

## 2022-07-18 NOTE — ANESTHESIA CARE TRANSFER NOTE
Patient: Mario Posada    Procedure: * No procedures listed *       Diagnosis: * No pre-op diagnosis entered *  Diagnosis Additional Information: No value filed.    Anesthesia Type:   General     Note:    Oropharynx: spontaneously breathing  Level of Consciousness: awake  Oxygen Supplementation: nasal cannula  Level of Supplemental Oxygen (L/min / FiO2): 2  Independent Airway: airway patency satisfactory and stable  Dentition: dentition unchanged  Vital Signs Stable: post-procedure vital signs reviewed and stable  Report to RN Given: handoff report given  Patient transferred to: Emergency Department    Handoff Report: Identifed the Patient, Identified the Reponsible Provider, Reviewed the pertinent medical history, Discussed the surgical course, Reviewed Intra-OP anesthesia mangement and issues during anesthesia, Set expectations for post-procedure period and Allowed opportunity for questions and acknowledgement of understanding      Vitals:  Vitals Value Taken Time   /76 07/18/22 0030   Temp     Pulse 82 07/18/22 0038   Resp 10 07/18/22 0038   SpO2 100 % 07/18/22 0038   Vitals shown include unvalidated device data.    Electronically Signed By: BELKIS Blanc CRNA  July 18, 2022  12:39 AM

## 2022-07-18 NOTE — ANESTHESIA POSTPROCEDURE EVALUATION
Patient: Mario Posada    Procedure: * No procedures listed *       Anesthesia Type:  General    Note:  Disposition: ED.   Postop Pain Control: Uneventful            Sign Out: Well controlled pain   PONV: No   Neuro/Psych: Uneventful            Sign Out: Acceptable/Baseline neuro status   Airway/Respiratory: Uneventful            Sign Out: Acceptable/Baseline resp. status; O2 supplementation               Oxygen: Nasal Cannula   CV/Hemodynamics: Uneventful            Sign Out: Acceptable CV status; No obvious hypovolemia; No obvious fluid overload   Other NRE: NONE   DID A NON-ROUTINE EVENT OCCUR? No           Last vitals:  Vitals:    07/18/22 0025 07/18/22 0026 07/18/22 0028   BP: 116/78     Pulse: 78 90 81   Resp: 15 17 14   Temp:      SpO2: 100% 100% 100%       Electronically Signed By: BELKIS Blanc CRNA  July 18, 2022  12:39 AM

## 2022-07-18 NOTE — ANESTHESIA PREPROCEDURE EVALUATION
Anesthesia Pre-Procedure Evaluation    Patient: Mario Posada   MRN: [id number] : 1985        Procedure :           No past medical history on file.   No past surgical history on file.   No Known Allergies   Social History     Tobacco Use     Smoking status: Former Smoker     Packs/day: 0.50     Years: 9.00     Pack years: 4.50     Types: Cigarettes     Smokeless tobacco: Never Used   Substance Use Topics     Alcohol use: Yes      Wt Readings from Last 1 Encounters:   22 59 kg (130 lb)        Anesthesia Evaluation   Pt has had prior anesthetic.     No history of anesthetic complications       ROS/MED HX  ENT/Pulmonary: Comment: Pneumothorax on left      Neurologic:  - neg neurologic ROS     Cardiovascular:       METS/Exercise Tolerance: >4 METS    Hematologic:  - neg hematologic  ROS     Musculoskeletal:  - neg musculoskeletal ROS     GI/Hepatic:  - neg GI/hepatic ROS     Renal/Genitourinary:  - neg Renal ROS     Endo:  - neg endo ROS     Psychiatric/Substance Use:  - neg psychiatric ROS     Infectious Disease:  - neg infectious disease ROS     Malignancy:  - neg malignancy ROS     Other:  - neg other ROS          Physical Exam    Airway        Mallampati: II   TM distance: > 3 FB   Neck ROM: full   Mouth opening: > 3 cm    Respiratory Devices and Support         Dental     Comment: Poor dentition    (+) missing and chipped      Cardiovascular   cardiovascular exam normal          Pulmonary   pulmonary exam normal                OUTSIDE LABS:  CBC:   Lab Results   Component Value Date    WBC 10.5 2016    WBC 10.7 2016    HGB 12.9 (L) 2016    HGB 12.5 (L) 2016    HCT 38.8 (L) 2016    HCT 37.8 (L) 2016     2016     2016     BMP:   Lab Results   Component Value Date     2016     2016    POTASSIUM 3.8 2016    POTASSIUM 3.6 2016    CHLORIDE 104 2016    CHLORIDE 102 2016    CO2 31 2016    CO2  30 02/22/2016    BUN 1 (L) 02/23/2016    BUN 3 (L) 02/22/2016    CR 0.68 02/23/2016    CR 0.57 (L) 02/22/2016     (H) 02/23/2016    GLC 77 02/22/2016     COAGS:   Lab Results   Component Value Date    INR 1.04 02/19/2016     POC:   Lab Results   Component Value Date    BGM 86 02/21/2016     HEPATIC:   Lab Results   Component Value Date    ALBUMIN 3.6 02/22/2016    PROTTOTAL 7.2 02/22/2016    ALT 51 02/22/2016    AST 39 02/22/2016    ALKPHOS 85 02/22/2016    BILITOTAL 0.8 02/22/2016    WILLIAM 18 02/21/2016     OTHER:   Lab Results   Component Value Date    PH 8.0 (H) 12/11/2001    LACT 0.8 02/18/2016    LARY 9.4 02/23/2016    MAG 2.1 02/18/2016    LIPASE 2,370 (H) 02/24/2016       Anesthesia Plan    ASA Status:  2, emergent    NPO Status:  NPO Appropriate    Anesthesia Type: General.   Induction: Propofol, Intravenous.   Maintenance: TIVA.        Consents    Anesthesia Plan(s) and associated risks, benefits, and realistic alternatives discussed. Questions answered and patient/representative(s) expressed understanding.     - Discussed: Risks, Benefits and Alternatives for BOTH SEDATION and the PROCEDURE were discussed     - Discussed with:  Patient      - Extended Intubation/Ventilatory Support Discussed: No.      - Patient is DNR/DNI Status: No    Use of blood products discussed: No .     Postoperative Care    Pain management: IV analgesics, Oral pain medications.   PONV prophylaxis: Ondansetron (or other 5HT-3), Dexamethasone or Solumedrol     Comments:                BELKIS Blanc CRNA

## 2022-07-18 NOTE — DISCHARGE INSTRUCTIONS
Follow up with surgery clinic.   You likely will need procedure in the future to help prevent this from occurring.      Return if worsening pains, shortness of breath, or if other concerns develop.      Tim Whitt MD  Thoracic Surgery:  this is who you have seen previously.

## 2022-07-18 NOTE — ED TRIAGE NOTES
Intermittent dry cough for a couple of weeks.  Patient denies shortness of breath, sore throat, ear pain.     Triage Assessment     Row Name 07/17/22 8386       Triage Assessment (Adult)    Airway WDL WDL       Respiratory WDL    Respiratory WDL X;cough    Cough Frequency infrequent    Cough Type dry       Skin Circulation/Temperature WDL    Skin Circulation/Temperature WDL WDL       Cardiac WDL    Cardiac WDL WDL       Peripheral/Neurovascular WDL    Peripheral Neurovascular WDL WDL       Cognitive/Neuro/Behavioral WDL    Cognitive/Neuro/Behavioral WDL WDL

## 2022-07-19 ENCOUNTER — TELEPHONE (OUTPATIENT)
Dept: ONCOLOGY | Facility: CLINIC | Age: 37
End: 2022-07-19

## 2022-07-19 ENCOUNTER — PATIENT OUTREACH (OUTPATIENT)
Dept: SURGERY | Facility: CLINIC | Age: 37
End: 2022-07-19

## 2022-07-19 DIAGNOSIS — J93.9 PNEUMOTHORAX ON RIGHT: Primary | ICD-10-CM

## 2022-07-19 NOTE — PROGRESS NOTES
Patient seen in the Emergency Department this past weekend with a pneumothorax. Dr Whitt would like to see patient in clinic today with a Chest CT.    [facility]/Voicemail    Clinical Data: Care Coordinator Outreach    Outreach attempted x 2.  Left message on patient's voicemail with call back information and requested return call.  Message sent to scheduling department with scheduling needs and to have patient call writer with any questions.     Plan: Care Coordinator will try to reach patient again 1 business day if no return call received or if scheduling department unsuccessful in reaching patient.    Génesis Mcgarry RN, BSN  Thoracic Surgery RN Care Coordinator      Addendum:  Patient returned writer's call. Explained to patient the reason for the call. Patient reported that he sleeps late. Informed writer that scheduling had been in touch with him and he is scheduled for a Chest CT at [facility] tomorrow 7/20/2022, at 2pm. Patient reports that he is still having tightness in his chest with deep breaths, but states that it is not any different from when he originally went into the ED on Sunday. Writer informed patient that Dr Whitt will review the CT tomorrow and depending on results of the CT, someone from the Thoracic Surgery will be in touch with him.  At this time, will keep already scheduled appointment for Tues July 28th.  Patient verbalized understand.

## 2022-07-19 NOTE — TELEPHONE ENCOUNTER
Mairo calls in after being in the ER Eulalio night. With a pneumothorax, they attempted to place a chest tube at that time and they were unsuccessful. They let some air out with the needle but could not get the tube in safely.   Was prescribed oxycodone at that time 6 tablets.     Takes the medication when it starts to hurt took 2 Sunday night and 3 on Monday and 1 today.   Denies any side effects from the medication.     Wondering if he can get a prescription from Dr Whitt to hold him until he has his appointment Tuesday 7/26/22?     Pharmacy is [facility] or [facility] in [place].

## 2022-07-20 ENCOUNTER — HOSPITAL ENCOUNTER (INPATIENT)
Facility: CLINIC | Age: 37
LOS: 1 days | Discharge: HOME | DRG: 199 | End: 2022-07-21
Attending: EMERGENCY MEDICINE | Admitting: THORACIC SURGERY (CARDIOTHORACIC VASCULAR SURGERY)

## 2022-07-20 ENCOUNTER — PATIENT OUTREACH (OUTPATIENT)
Dept: SURGERY | Facility: CLINIC | Age: 37
End: 2022-07-20

## 2022-07-20 ENCOUNTER — APPOINTMENT (OUTPATIENT)
Dept: GENERAL RADIOLOGY | Facility: CLINIC | Age: 37
DRG: 199 | End: 2022-07-20
Attending: EMERGENCY MEDICINE

## 2022-07-20 ENCOUNTER — HOSPITAL ENCOUNTER (OUTPATIENT)
Dept: CT IMAGING | Facility: CLINIC | Age: 37
Discharge: HOME | End: 2022-07-20
Attending: CLINICAL NURSE SPECIALIST | Admitting: CLINICAL NURSE SPECIALIST

## 2022-07-20 DIAGNOSIS — U07.1 LAB TEST POSITIVE FOR DETECTION OF COVID-19 VIRUS: ICD-10-CM

## 2022-07-20 DIAGNOSIS — J93.9 PNEUMOTHORAX ON LEFT: Primary | ICD-10-CM

## 2022-07-20 DIAGNOSIS — J93.11 PRIMARY SPONTANEOUS PNEUMOTHORAX: ICD-10-CM

## 2022-07-20 DIAGNOSIS — J93.9 PNEUMOTHORAX ON RIGHT: ICD-10-CM

## 2022-07-20 LAB
ABO/RH(D): NORMAL
ANTIBODY SCREEN: NEGATIVE
SPECIMEN EXPIRATION DATE: NORMAL

## 2022-07-20 PROCEDURE — 96360 HYDRATION IV INFUSION INIT: CPT

## 2022-07-20 PROCEDURE — 99285 EMERGENCY DEPT VISIT HI MDM: CPT | Performed by: EMERGENCY MEDICINE

## 2022-07-20 PROCEDURE — 80048 BASIC METABOLIC PNL TOTAL CA: CPT

## 2022-07-20 PROCEDURE — 85027 COMPLETE CBC AUTOMATED: CPT

## 2022-07-20 PROCEDURE — 120N000002 HC R&B MED SURG/OB UMMC

## 2022-07-20 PROCEDURE — 71260 CT THORAX DX C+: CPT

## 2022-07-20 PROCEDURE — 84100 ASSAY OF PHOSPHORUS: CPT

## 2022-07-20 PROCEDURE — 250N000009 HC RX 250: Performed by: RADIOLOGY

## 2022-07-20 PROCEDURE — 83735 ASSAY OF MAGNESIUM: CPT

## 2022-07-20 PROCEDURE — 96361 HYDRATE IV INFUSION ADD-ON: CPT | Performed by: EMERGENCY MEDICINE

## 2022-07-20 PROCEDURE — 250N000011 HC RX IP 250 OP 636: Performed by: RADIOLOGY

## 2022-07-20 PROCEDURE — 250N000013 HC RX MED GY IP 250 OP 250 PS 637: Performed by: NURSE PRACTITIONER

## 2022-07-20 PROCEDURE — 71046 X-RAY EXAM CHEST 2 VIEWS: CPT

## 2022-07-20 PROCEDURE — 71046 X-RAY EXAM CHEST 2 VIEWS: CPT | Mod: 26 | Performed by: RADIOLOGY

## 2022-07-20 PROCEDURE — C9803 HOPD COVID-19 SPEC COLLECT: HCPCS | Performed by: EMERGENCY MEDICINE

## 2022-07-20 PROCEDURE — 86850 RBC ANTIBODY SCREEN: CPT

## 2022-07-20 PROCEDURE — 36415 COLL VENOUS BLD VENIPUNCTURE: CPT

## 2022-07-20 PROCEDURE — 99285 EMERGENCY DEPT VISIT HI MDM: CPT | Mod: 25 | Performed by: EMERGENCY MEDICINE

## 2022-07-20 RX ORDER — OXYCODONE HYDROCHLORIDE 5 MG/1
5-10 TABLET ORAL EVERY 4 HOURS PRN
Status: DISCONTINUED | OUTPATIENT
Start: 2022-07-20 | End: 2022-07-21 | Stop reason: HOSPADM

## 2022-07-20 RX ORDER — ENOXAPARIN SODIUM 100 MG/ML
40 INJECTION SUBCUTANEOUS
Status: CANCELLED | OUTPATIENT
Start: 2022-07-20

## 2022-07-20 RX ORDER — HYDROXYZINE HYDROCHLORIDE 50 MG/1
50 TABLET, FILM COATED ORAL EVERY 6 HOURS PRN
Status: DISCONTINUED | OUTPATIENT
Start: 2022-07-20 | End: 2022-07-21 | Stop reason: HOSPADM

## 2022-07-20 RX ORDER — ONDANSETRON 2 MG/ML
4 INJECTION INTRAMUSCULAR; INTRAVENOUS EVERY 6 HOURS PRN
Status: DISCONTINUED | OUTPATIENT
Start: 2022-07-20 | End: 2022-07-21 | Stop reason: HOSPADM

## 2022-07-20 RX ORDER — OXYCODONE HYDROCHLORIDE 5 MG/1
5 TABLET ORAL ONCE
Status: COMPLETED | OUTPATIENT
Start: 2022-07-20 | End: 2022-07-20

## 2022-07-20 RX ORDER — SODIUM CHLORIDE, SODIUM LACTATE, POTASSIUM CHLORIDE, CALCIUM CHLORIDE 600; 310; 30; 20 MG/100ML; MG/100ML; MG/100ML; MG/100ML
INJECTION, SOLUTION INTRAVENOUS CONTINUOUS
Status: DISCONTINUED | OUTPATIENT
Start: 2022-07-21 | End: 2022-07-21 | Stop reason: HOSPADM

## 2022-07-20 RX ORDER — CEFAZOLIN SODIUM 2 G/100ML
2 INJECTION, SOLUTION INTRAVENOUS
Status: CANCELLED | OUTPATIENT
Start: 2022-07-20

## 2022-07-20 RX ORDER — LANOLIN ALCOHOL/MO/W.PET/CERES
3 CREAM (GRAM) TOPICAL
Status: DISCONTINUED | OUTPATIENT
Start: 2022-07-20 | End: 2022-07-21 | Stop reason: HOSPADM

## 2022-07-20 RX ORDER — IOPAMIDOL 755 MG/ML
58 INJECTION, SOLUTION INTRAVASCULAR ONCE
Status: COMPLETED | OUTPATIENT
Start: 2022-07-20 | End: 2022-07-20

## 2022-07-20 RX ORDER — ONDANSETRON 4 MG/1
4 TABLET, ORALLY DISINTEGRATING ORAL EVERY 6 HOURS PRN
Status: DISCONTINUED | OUTPATIENT
Start: 2022-07-20 | End: 2022-07-21 | Stop reason: HOSPADM

## 2022-07-20 RX ORDER — CEFAZOLIN SODIUM 2 G/100ML
2 INJECTION, SOLUTION INTRAVENOUS SEE ADMIN INSTRUCTIONS
Status: CANCELLED | OUTPATIENT
Start: 2022-07-20

## 2022-07-20 RX ORDER — HYDROXYZINE HYDROCHLORIDE 25 MG/1
25 TABLET, FILM COATED ORAL EVERY 6 HOURS PRN
Status: DISCONTINUED | OUTPATIENT
Start: 2022-07-20 | End: 2022-07-21 | Stop reason: HOSPADM

## 2022-07-20 RX ORDER — ACETAMINOPHEN 325 MG/1
650 TABLET ORAL EVERY 4 HOURS PRN
Status: DISCONTINUED | OUTPATIENT
Start: 2022-07-20 | End: 2022-07-21 | Stop reason: HOSPADM

## 2022-07-20 RX ORDER — LIDOCAINE 40 MG/G
CREAM TOPICAL
Status: DISCONTINUED | OUTPATIENT
Start: 2022-07-20 | End: 2022-07-21 | Stop reason: HOSPADM

## 2022-07-20 RX ADMIN — SODIUM CHLORIDE 56 ML: 9 INJECTION, SOLUTION INTRAVENOUS at 14:01

## 2022-07-20 RX ADMIN — IOPAMIDOL 58 ML: 755 INJECTION, SOLUTION INTRAVENOUS at 14:01

## 2022-07-20 RX ADMIN — OXYCODONE HYDROCHLORIDE 5 MG: 5 TABLET ORAL at 21:23

## 2022-07-20 ASSESSMENT — ENCOUNTER SYMPTOMS
COLOR CHANGE: 0
ABDOMINAL DISTENTION: 0
FATIGUE: 0
NAUSEA: 0
CONFUSION: 0
WEAKNESS: 0
ARTHRALGIAS: 0
NECK PAIN: 0
CONSTIPATION: 0
HEADACHES: 0
DYSURIA: 0
FEVER: 0
FREQUENCY: 0
DIZZINESS: 0
ABDOMINAL PAIN: 0
VOMITING: 0
DIFFICULTY URINATING: 0
PALPITATIONS: 0
MYALGIAS: 0
SORE THROAT: 0
BACK PAIN: 1
EYE PAIN: 0
SHORTNESS OF BREATH: 0
CHILLS: 0
DIARRHEA: 0
CHEST TIGHTNESS: 0
COUGH: 0

## 2022-07-20 ASSESSMENT — ACTIVITIES OF DAILY LIVING (ADL): ADLS_ACUITY_SCORE: 37

## 2022-07-20 NOTE — PROGRESS NOTES
"[facility]: Cancer Care Short Note                                    Discussion with Patient:                                                      Called patient to follow-up regarding call for prescription for pain medication. Patient reporting pain \"3/10\" with normal breaths. States pain increases to \"6/10\" when laying back. Patient reports experiencing a constant \"bubbling\" sensation in the middle of his right back that hasn't changed since he originally went into the ED over the weekend. Also reports experiencing occasional jabs and sharp pains.  States he has tried ibuprofen with no relief. Reports that he was given 6 oxycodone tablets at discharge from the ED over the weekend.  He reports only taking at night to try to sleep. He is currently out of oxycodone and requesting a refill to get through until his appointment with Dr Whitt next week.  Had Chest CT done this afternoon.     Intervention/Education provided during outreach:                                                       Updated Dr Whitt of patient's status and that Chest CT has been done and results are available in Epic. Due to patient requiring narcotic pain medication with pneumothorax, Dr Whitt would like patient to go to the [facility] Emergency to be assessed for possible need for intervention. Writer called patient to inform him that Dr Whitt would like him to be seen at the [facility] ED. Patient verbalized his understanding. Patient stated that he will head to the [place] ED as soon as his girlfriend returns with his car, he verbalized would be soon.  Writer informed patient to inform ED staff to page Thoracic Surgery Fellow when he arrives at the ED. Writer updated Thoracic Surgery fellow and On-Call Thoracic Surgeon.     Signature:  Génesis Mcgarry RN  "

## 2022-07-21 ENCOUNTER — APPOINTMENT (OUTPATIENT)
Dept: GENERAL RADIOLOGY | Facility: CLINIC | Age: 37
DRG: 199 | End: 2022-07-21

## 2022-07-21 VITALS
OXYGEN SATURATION: 98 % | WEIGHT: 130 LBS | RESPIRATION RATE: 16 BRPM | TEMPERATURE: 97.6 F | DIASTOLIC BLOOD PRESSURE: 97 MMHG | BODY MASS INDEX: 19.7 KG/M2 | HEIGHT: 68 IN | HEART RATE: 84 BPM | SYSTOLIC BLOOD PRESSURE: 130 MMHG

## 2022-07-21 LAB
ANION GAP SERPL CALCULATED.3IONS-SCNC: 10 MMOL/L (ref 7–15)
BUN SERPL-MCNC: 5.9 MG/DL (ref 6–20)
CALCIUM SERPL-MCNC: 8.9 MG/DL (ref 8.6–10)
CHLORIDE SERPL-SCNC: 100 MMOL/L (ref 98–107)
CREAT SERPL-MCNC: 0.69 MG/DL (ref 0.67–1.17)
DEPRECATED HCO3 PLAS-SCNC: 27 MMOL/L (ref 22–29)
ERYTHROCYTE [DISTWIDTH] IN BLOOD BY AUTOMATED COUNT: 12.3 % (ref 10–15)
GFR SERPL CREATININE-BSD FRML MDRD: >90 ML/MIN/1.73M2
GLUCOSE SERPL-MCNC: 100 MG/DL (ref 70–99)
HCT VFR BLD AUTO: 37.5 % (ref 40–53)
HGB BLD-MCNC: 12.4 G/DL (ref 13.3–17.7)
MAGNESIUM SERPL-MCNC: 2.2 MG/DL (ref 1.7–2.3)
MCH RBC QN AUTO: 30.5 PG (ref 26.5–33)
MCHC RBC AUTO-ENTMCNC: 33.1 G/DL (ref 31.5–36.5)
MCV RBC AUTO: 92 FL (ref 78–100)
PHOSPHATE SERPL-MCNC: 4.2 MG/DL (ref 2.5–4.5)
PLATELET # BLD AUTO: 238 10E3/UL (ref 150–450)
POTASSIUM SERPL-SCNC: 3.4 MMOL/L (ref 3.4–5.3)
RBC # BLD AUTO: 4.06 10E6/UL (ref 4.4–5.9)
SARS-COV-2 RNA RESP QL NAA+PROBE: POSITIVE
SODIUM SERPL-SCNC: 137 MMOL/L (ref 136–145)
WBC # BLD AUTO: 4.4 10E3/UL (ref 4–11)

## 2022-07-21 PROCEDURE — 71045 X-RAY EXAM CHEST 1 VIEW: CPT

## 2022-07-21 PROCEDURE — 71045 X-RAY EXAM CHEST 1 VIEW: CPT | Mod: 26 | Performed by: RADIOLOGY

## 2022-07-21 PROCEDURE — U0005 INFEC AGEN DETEC AMPLI PROBE: HCPCS | Performed by: EMERGENCY MEDICINE

## 2022-07-21 PROCEDURE — 258N000003 HC RX IP 258 OP 636

## 2022-07-21 PROCEDURE — 250N000013 HC RX MED GY IP 250 OP 250 PS 637

## 2022-07-21 RX ORDER — HYDROXYZINE HYDROCHLORIDE 25 MG/1
25 TABLET, FILM COATED ORAL EVERY 6 HOURS PRN
Qty: 20 TABLET | Refills: 0 | Status: SHIPPED | OUTPATIENT
Start: 2022-07-21

## 2022-07-21 RX ORDER — ACETAMINOPHEN 325 MG/1
650 TABLET ORAL EVERY 6 HOURS PRN
Start: 2022-07-21

## 2022-07-21 RX ADMIN — OXYCODONE HYDROCHLORIDE 5 MG: 5 TABLET ORAL at 02:57

## 2022-07-21 RX ADMIN — HYDROXYZINE HYDROCHLORIDE 25 MG: 25 TABLET ORAL at 01:10

## 2022-07-21 RX ADMIN — SODIUM CHLORIDE, POTASSIUM CHLORIDE, SODIUM LACTATE AND CALCIUM CHLORIDE: 600; 310; 30; 20 INJECTION, SOLUTION INTRAVENOUS at 01:09

## 2022-07-21 ASSESSMENT — ACTIVITIES OF DAILY LIVING (ADL)
ADLS_ACUITY_SCORE: 37

## 2022-07-21 NOTE — DISCHARGE SUMMARY
NAME: Mario Posada   MRN: [id number]   : 1985     DATE OF ADMISSION: 2022     DIAGNOSES: Recurrent right spontaneous pneumothorax    PROCEDURES PLANNED: Right VATS, blebectomy, pleurodesis (postponed due to COVID +)    DATE OF DISCHARGE:  22    HOSPITAL COURSE: Mario Posada is a 36 year old male who on 2022 presented with right chest discomfort. He has a history of spontaneous pneumothorax, recurrent on both the right and left sides. He had previously been seen on  in the ED, at which time chest tube was attempted but there was no aspiration of air. He has continued to have pain in the right shoulder/back, prompting him to return. He was otherwise asymptomatic, no sob, dyspnea or hypoxia. CXR and CT chest showed stable pneumothorax, but given persistence and recurrent nature, opted to plan for surgery the following day. Unfortunately his COVID test returned positive. He is vaccinated and has not had symptoms, but due to this finding, surgery was postponed. We will plan to reschedule him in the near future.    DISCHARGE EXAM:   A&O, lying on gurney, NAD  Resp non-labored on room air  RRR on tele  Distal extremities warm    DISCHARGE INSTRUCTIONS:  Discharge Procedure Orders   Activity   Order Comments: Your activity upon discharge: activity as tolerated     Order Specific Question Answer Comments   Is discharge order? Yes      Reason for your hospital stay   Order Comments: Pneumothorax     Discharge Instructions   Order Comments: THORACIC SURGERY DISCHARGE INSTRUCTIONS    DIET: Regular diet as tolerated    Activity as tolerated.    You should take over the counter tylenol and ibuprofen for pain. Do not take on an empty stomach.   Tylenol 650mg every 6 hours and Ibuprofen 400mg every 6 hours - alternate these medications every 3 hours. If you have breakthrough pain, you may also take hydroxyzine as needed, 25 mg every 6 hours.     You can expect a phone call from the Thoracic Surgery  "team in the next week or so, discussing timing of surgery.    Call for fever greater than 101.5, chills, shortness of breath, or other concerns.    In emergencies, call 911    For other Questions or Concerns;   A.) During weekday working hours (Monday through Friday 8am to 4:30pm)   call [phone] and ask to speak to a clinical nurse specialist.     B.) At nights (after 4:30pm), on weekends, or if urgent call [phone] and   tell the  \"I would like to page job code [phone], the thoracic surgery   fellow on call, please.\"     Diet   Order Comments: Follow this diet upon discharge: Regular     Order Specific Question Answer Comments   Is discharge order? Yes        DISCHARGE MEDICATIONS:   Current Discharge Medication List      START taking these medications    Details   acetaminophen (TYLENOL) 325 MG tablet Take 2 tablets (650 mg) by mouth every 6 hours as needed for mild pain or fever    Associated Diagnoses: Primary spontaneous pneumothorax      hydrOXYzine (ATARAX) 25 MG tablet Take 1 tablet (25 mg) by mouth every 6 hours as needed for other or anxiety (adjuvant pain)  Qty: 20 tablet, Refills: 0    Associated Diagnoses: Primary spontaneous pneumothorax         CONTINUE these medications which have NOT CHANGED    Details   IBUPROFEN PO Take 400 mg by mouth every 6 hours as needed for moderate pain      oxyCODONE-acetaminophen (PERCOCET) 5-325 MG tablet Take 1 tablet by mouth every 6 hours as needed for severe pain  Qty: 6 tablet, Refills: 0      EPINEPHrine (PRIMATENE MIST IN) Inhale 1 puff into the lungs once as needed (wheezing)         STOP taking these medications       oxyCODONE (ROXICODONE) 5 MG tablet Comments:   Reason for Stopping:             Katharina Chappell MD  PGY-3 General Surgery              "

## 2022-07-21 NOTE — ED PROVIDER NOTES
"    [place] [facility]MENT ([facility])  7/20/22    History     Chief Complaint   Patient presents with     pneumothorax with pain     Throacic Surg Consult     HPI  Mario Posada is a 36 year old male with a history of recurring pneumothoraces and alcoholic pancreatitis who presents to the Emergency Department with known pneumothorax with continued pain. Patient reports he developed a pneumothorax about 2.5 weeks ago. He took 1 week off of work and rested without improvement. Patient reports he has continued to have pain. He states when he takes deep breaths his chest feels tight and he gets sharp jabs of pain. He states when he lays down he also gets these sharp jabs in his right shoulder and just under the right shoulder blade. Patient reports he also gets \"bubbling\" sensation when leaning over. Patient presented to the ED on Sunday, 7/17, due to this. Chest tube was attempted to be placed, however, unable to aspirate air from the right anterior chest wall and procedure was terminated. Patient reports on Monday morning, 7/18, he woke up with productive cough. This continued yesterday and he had a sore throat as well. Patient notes these symptoms have resolved today. Patient had been using oxycodone for pain, however, ran out and has been using ibuprofen. Patient denies fevers or chills. Patient reports this is his 6th or 7th pneumothorax. Patient contacted thoracic surgery today and was advised to come in.    Past Medical History  Past Medical History:   Diagnosis Date     Pneumothorax      History reviewed. No pertinent surgical history.  acetaminophen (TYLENOL) 325 MG tablet  hydrOXYzine (ATARAX) 25 MG tablet  IBUPROFEN PO  oxyCODONE-acetaminophen (PERCOCET) 5-325 MG tablet  EPINEPHrine (PRIMATENE MIST IN)      No Known Allergies  Family History  Family History   Problem Relation Age of Onset     Unknown/Adopted Father      Social History   Social History     Tobacco Use     Smoking status: " "Former Smoker     Packs/day: 0.50     Years: 9.00     Pack years: 4.50     Types: Cigarettes     Smokeless tobacco: Never Used   Substance Use Topics     Alcohol use: Yes     Drug use: Yes     Types: Marijuana      Past medical history, past surgical history, medications, allergies, family history, and social history were reviewed with the patient. No additional pertinent items.       Review of Systems   Constitutional: Negative for chills, fatigue and fever.   HENT: Negative for congestion and sore throat.    Eyes: Negative for pain and visual disturbance.   Respiratory: Negative for cough, chest tightness and shortness of breath.    Cardiovascular: Negative for chest pain and palpitations.   Gastrointestinal: Negative for abdominal distention, abdominal pain, constipation, diarrhea, nausea and vomiting.   Genitourinary: Negative for difficulty urinating, dysuria, frequency and urgency.   Musculoskeletal: Positive for back pain (right). Negative for arthralgias, myalgias and neck pain.   Skin: Negative for color change and rash.   Neurological: Negative for dizziness, weakness and headaches.   Psychiatric/Behavioral: Negative for confusion.     A complete review of systems was performed with pertinent positives and negatives noted in the HPI, and all other systems negative.    Physical Exam   BP: 125/86  Pulse: 81  Temp: 98.5  F (36.9  C)  Resp: 16  Height: 172.7 cm (5' 8\")  Weight: 59 kg (130 lb)  SpO2: 99 %  Physical Exam  Vitals and nursing note reviewed.   Constitutional:       General: He is not in acute distress.     Appearance: Normal appearance. He is not ill-appearing or toxic-appearing.   HENT:      Head: Normocephalic and atraumatic.      Nose: Nose normal.      Mouth/Throat:      Mouth: Mucous membranes are moist.   Eyes:      Pupils: Pupils are equal, round, and reactive to light.   Cardiovascular:      Rate and Rhythm: Normal rate.      Pulses: Normal pulses.      Heart sounds: Normal heart sounds. "   Pulmonary:      Effort: Pulmonary effort is normal. No respiratory distress.      Breath sounds: Normal breath sounds.      Comments: Reported pain in the right upper chest wall.  No tenderness to palpation.  No crepitus.  No deformities.  No skin changes.  No nasal flaring, accessory muscle use, tachypnea, or other signs of respiratory distress  Abdominal:      General: Abdomen is flat. There is no distension.   Musculoskeletal:         General: No swelling or deformity. Normal range of motion.      Cervical back: Normal range of motion. No rigidity.   Skin:     General: Skin is warm.      Capillary Refill: Capillary refill takes less than 2 seconds.   Neurological:      Mental Status: He is alert and oriented to person, place, and time.   Psychiatric:         Mood and Affect: Mood normal.         ED Course   9:12 PM  The patient was seen and examined by Damian Mills DO in [facility].         Results for orders placed or performed during the hospital encounter of 07/20/22   XR Chest 2 Views     Status: None    Narrative    EXAM: XR CHEST 2 VIEWS  7/20/2022 9:33 PM     HISTORY:  pneumothorax       COMPARISON:  Same-day CT, radiograph 7/18/2022    FINDINGS: Two views of the chest.      Stable cardiac mediastinal silhouette. No significant pleural  effusion. Small right apical pneumothorax. No focal airspace opacity.      Impression    IMPRESSION: Stable small right apical pneumothorax.    I have personally reviewed the examination and initial interpretation  and I agree with the findings.    SANJIV KILGORE MD         SYSTEM ID:  [id number]   XR Chest Port 1 View     Status: None    Narrative    EXAM: XR CHEST PORT 1 VIEW  LOCATION: [facility]  DATE/TIME: 7/21/2022 4:58 AM    INDICATION: COVID+, monitoring of right apical pneumothorax  COMPARISON: 07/20/2022      Impression    IMPRESSION: Small right apical pneumothorax is likely unchanged, measuring approximately 9 mm in  depth. Heart size is normal. No new lobar consolidation or pleural effusions. No acute bony abnormality.   Asymptomatic COVID-19 Virus (Coronavirus) by PCR Nose     Status: Abnormal    Specimen: Nose; Swab   Result Value Ref Range    SARS CoV2 PCR Positive (A) Negative, Testing sent to reference lab. Results will be returned via unsolicited result    Narrative    Testing was performed using the Xpert Xpress SARS-CoV-2 Assay on the  Cepheid Gene-Xpert Instrument Systems. Additional information about  this Emergency Use Authorization (EUA) assay can be found via the Lab  Guide. This test should be ordered for the detection of SARS-CoV-2 in  individuals who meet SARS-CoV-2 clinical and/or epidemiological  criteria. Test performance is unknown in asymptomatic patients. This  test is for in vitro diagnostic use under the FDA EUA for  laboratories certified under CLIA to perform high complexity testing.  This test has not been FDA cleared or approved. A negative result  does not rule out the presence of PCR inhibitors in the specimen or  target RNA in concentration below the limit of detection for the  assay. The possibility of a false negative should be considered if  the patient's recent exposure or clinical presentation suggests  COVID-19. This test was validated by the [facility]. This laboratory is certified under  the Clinical Laboratory Improvement Amendments of 1988 (CLIA-88) as  qualified to perform high complexity laboratory testing.     CBC with platelets     Status: Abnormal   Result Value Ref Range    WBC Count 4.4 4.0 - 11.0 10e3/uL    RBC Count 4.06 (L) 4.40 - 5.90 10e6/uL    Hemoglobin 12.4 (L) 13.3 - 17.7 g/dL    Hematocrit 37.5 (L) 40.0 - 53.0 %    MCV 92 78 - 100 fL    MCH 30.5 26.5 - 33.0 pg    MCHC 33.1 31.5 - 36.5 g/dL    RDW 12.3 10.0 - 15.0 %    Platelet Count 238 150 - 450 10e3/uL   Basic metabolic panel     Status: Abnormal   Result Value Ref  Range    Creatinine 0.69 0.67 - 1.17 mg/dL    Sodium 137 136 - 145 mmol/L    Potassium 3.4 3.4 - 5.3 mmol/L    Urea Nitrogen 5.9 (L) 6.0 - 20.0 mg/dL    Chloride 100 98 - 107 mmol/L    Carbon Dioxide (CO2) 27 22 - 29 mmol/L    Anion Gap 10 7 - 15 mmol/L    Glucose 100 (H) 70 - 99 mg/dL    GFR Estimate >90 >60 mL/min/1.73m2    Calcium 8.9 8.6 - 10.0 mg/dL   Magnesium     Status: Normal   Result Value Ref Range    Magnesium 2.2 1.7 - 2.3 mg/dL   Phosphorus     Status: Normal   Result Value Ref Range    Phosphorus 4.2 2.5 - 4.5 mg/dL   Adult Type and Screen     Status: None   Result Value Ref Range    ABO/RH(D) B POS     Antibody Screen Negative Negative    SPECIMEN EXPIRATION DATE [id number]    ABO/Rh type and screen     Status: None    Narrative    The following orders were created for panel order ABO/Rh type and screen.  Procedure                               Abnormality         Status                     ---------                               -----------         ------                     Adult Type and Screen[[id number]]                            Final result                 Please view results for these tests on the individual orders.     Medications   oxyCODONE (ROXICODONE) tablet 5 mg (5 mg Oral Given 7/20/22 2123)        Assessments & Plan (with Medical Decision Making)   Patient with history of recurrent pneumothorax presents to the ED for right-sided chest pain.  CT scan done earlier today shows continued small right apical pneumothorax.  Discussed with thoracic surgery in the outpatient setting recommended he come to the ED for consideration of possible procedure.    On arrival, patient is in no respiratory distress.  He is 98% on room air.  Continues to have right-sided chest pain.  No crepitus, tenderness, or other findings on exam.  No suspicion for tension pneumothorax.  Current x-ray redemonstrates apical pneumothorax without any evolution.    Case discussed with the trauma surgery team who admit  to their service.  Plan for likely VATS with pleurodesis early in the morning.  Patient should be n.p.o. at midnight.    I have reviewed the nursing notes. I have reviewed the findings, diagnosis, plan and need for follow up with the patient.    Discharge Medication List as of 7/21/2022  8:32 AM      START taking these medications    Details   acetaminophen (TYLENOL) 325 MG tablet Take 2 tablets (650 mg) by mouth every 6 hours as needed for mild pain or fever, No Print Out      hydrOXYzine (ATARAX) 25 MG tablet Take 1 tablet (25 mg) by mouth every 6 hours as needed for other or anxiety (adjuvant pain), Disp-20 tablet, R-0, E-Prescribe             Final diagnoses:   Primary spontaneous pneumothorax     I, Nichole Novoa, am serving as a trained medical scribe to document services personally performed by Damian Mills DO, based on the provider's statements to me.      I, Damian Mills DO, was physically present and have reviewed and verified the accuracy of this note documented by Nichole Novoa.    --  Damian Mills DO  [facility] EMERGENCY DEPARTMENT  7/20/2022     Damian Mills DO  07/23/22 0147

## 2022-07-21 NOTE — H&P
THORACIC SURGERY HISTORY AND PHYSICAL    Mario Posada  Date of Service: 7/20/2022 10:07 PM    Reason for Admission: Recurrent pneumothorax    HPI: Mario Posada is a 36 year old man with PMH of alcoholic pancreatitis and multiple spontaneous pneumothoraces (patient estimates 6 or 7, on both right and left), presenting to the ED with continued pain in the setting of known right apical pneumothorax first noted about 2.5 weeks ago.  Pain is sharp, primarily in the right shoulder/back, worst with deep inspiration. He was seen in the ED 7/17 for the same issue, chest tube placement was attempted and abandoned when they were unable to aspirate air. He was scheduled to see Dr. Whitt in thoracic surgery clinic next week, called the clinic earlier today to request more pain medication to get him through to that appointment, as he was still having significant pain. Dr. Whitt recommended he present to the [facility] ED for evaluation by thoracic surgery given his continued pain requiring narcotics. CT chest performed today showing right apical pneumothorax, stable in size from previous imaging. Primary concern is pain, denies SOB, fever/chills. Felt fatigued earlier in the week, but attributes this to his time spent at a music festival over the weekend and getting dehydrated.     A/P: Patient is a 36 year old male with PMH of alcoholic pancreatitis and recurrent spontaneous pneumothoraces, 6-7 occurrences. No SOB. CXR and CT chest today showing stable size of right apical pneumothorax.     - Admit to thoracic surgery  - Multimodal pain control: tylenol, oxycodone, atarax  - NPO at midnight, IVF  - Repeat CXR in AM  - Plan for OR tomorrow 7/21 for Right VATS blebectomy and pleurodesis.   - Added on to OR schedule for 7/21, pre-op orders placed. Plan discussed with patient, informed consent completed and placed in patient chart    Patient and plan discussed with thoracic surgery fellow, who discussed with staff.     Thank you  for the opportunity to participate in the care of this patient.    PMH:  Spontaneous pneumothorax  Alcoholic pancreatitis    PSH:  History reviewed. No pertinent surgical history.    FH:  family history includes Unknown/Adopted in his father.  No known family history of bleeding disorders or issues with anesthesia    SH:  Tobacco: former smoker, 11 PY, quit > 5 years ago, no vaping  EtOH use: current drinker    Allergies:  No Known Allergies    Home Meds:  None    ROS: Negative unless otherwise noted in HPI    Physical Exam:  Temp:  [98.5  F (36.9  C)] 98.5  F (36.9  C)  Pulse:  [81] 81  Resp:  [16] 16  BP: (125)/(86) 125/86  SpO2:  [99 %] 99 %    Gen: NAD, appears comfortable in bed  Lungs: Breathing non-labored on RA, equal chest rise  CV: RRR  Abd: Soft, non-distended  Ext: wwp, no edema  Neuro: AOx3    Labs:  CBCNo lab results found in last 7 days.  BMP  Recent Labs   Lab 07/20/22  2331      POTASSIUM 3.4   CHLORIDE 100   CO2 27   BUN 5.9*   CR 0.69   *       Imaging:  Results for orders placed or performed during the hospital encounter of 07/20/22 (from the past 24 hour(s))   XR Chest 2 Views    Narrative    EXAM: XR CHEST 2 VIEWS  7/20/2022 9:33 PM     HISTORY:  pneumothorax       COMPARISON:  Same-day CT, radiograph 7/18/2022    FINDINGS: Two views of the chest.      Stable cardiac mediastinal silhouette. No significant pleural  effusion. Small right apical pneumothorax. No focal airspace opacity.      Impression    IMPRESSION: Stable small right apical pneumothorax.    I have personally reviewed the examination and initial interpretation  and I agree with the findings.    SANJIV KILGORE MD         SYSTEM ID:  [id number]   Basic metabolic panel   Result Value Ref Range    Creatinine 0.69 0.67 - 1.17 mg/dL    Sodium 137 136 - 145 mmol/L    Potassium 3.4 3.4 - 5.3 mmol/L    Urea Nitrogen 5.9 (L) 6.0 - 20.0 mg/dL    Chloride 100 98 - 107 mmol/L    Carbon Dioxide (CO2) 27 22 - 29 mmol/L    Anion  Gap 10 7 - 15 mmol/L    Glucose 100 (H) 70 - 99 mg/dL    GFR Estimate >90 >60 mL/min/1.73m2    Calcium 8.9 8.6 - 10.0 mg/dL   Magnesium   Result Value Ref Range    Magnesium 2.2 1.7 - 2.3 mg/dL   Phosphorus   Result Value Ref Range    Phosphorus 4.2 2.5 - 4.5 mg/dL   ABO/Rh type and screen    Narrative    The following orders were created for panel order ABO/Rh type and screen.  Procedure                               Abnormality         Status                     ---------                               -----------         ------                     Adult Type and Screen[[id number]]                            Final result                 Please view results for these tests on the individual orders.   Adult Type and Screen   Result Value Ref Range    ABO/RH(D) B POS     Antibody Screen Negative Negative    SPECIMEN EXPIRATION DATE [id number]        STAFF ADDENDUM  I did not see Mr. Posada, but I reviewed his case with the resident.

## 2022-07-21 NOTE — PROGRESS NOTES
Discharge instructions reviewed, understood, and signed by patient. VSS, PIV removed, new medications reviewed and understood, patient has all belongings. Patient left unit via self to discharge pharmacy.

## 2022-07-22 ENCOUNTER — PATIENT OUTREACH (OUTPATIENT)
Dept: CARE COORDINATION | Facility: CLINIC | Age: 37
End: 2022-07-22

## 2022-07-22 DIAGNOSIS — Z71.89 OTHER SPECIFIED COUNSELING: ICD-10-CM

## 2022-07-22 DIAGNOSIS — J93.9 PNEUMOTHORAX ON RIGHT: Primary | ICD-10-CM

## 2022-07-22 NOTE — PROGRESS NOTES
Clinic Care Coordination Contact  [facility]/Voicemail       Clinical Data: Care Coordinator Outreach    Outreach attempted x 1.  Left message on patient's voicemail with call back information and requested return call.    Plan:  Care Coordinator will try to reach patient again in 1-2 business days.    Aziza Lion, [facility]  [phone]  [facility]

## 2022-07-23 NOTE — PROGRESS NOTES
[facility] Care Resource Center Contact  [facility]/Voicemail     Clinical Data: Care Coordinator Outreach - TCM      Outreach attempted x 2.  Left message on patient's voicemail, providing [facility]'s 24/7 scheduling and nurse triage phone number [phone] ([phone]) for questions/concerns and/or to schedule an appt with an [facility] provider, if they do not have a PCP.      Plan:  [facility] will do no further outreaches at this time.       Coral Kelley RN  [facility] Resource [place], [facility]    *Connected Care Resource Team does NOT follow patient ongoing. Referrals are identified based on internal discharge reports and the outreach is to ensure patient has an understanding of their discharge instructions.

## 2022-07-27 ENCOUNTER — PATIENT OUTREACH (OUTPATIENT)
Dept: SURGERY | Facility: CLINIC | Age: 37
End: 2022-07-27

## 2022-07-27 NOTE — PROGRESS NOTES
Received voicemail from patient. Voicemail was left at 12:50am. Patient calling to inquire about surgery date.  In the message patient reports that his pain is keeping him from sleeping at night and then he ends up sleeping off and on all day.  Patient scheduled to see Dr Whitt on 8/2/2022 with a chest xray prior.     [facility]/Voicemail    Clinical Data: Care Coordinator Outreach    Outreach attempted x 1.  Left message on patient's voicemail stating that our surgery scheduler is still working on finalizing a surgery date, and that Dr Whitt wants to see how he's doing when sees him on August 2nd. Provided appointment dates and times in message. Left writers call back information and requested return call if patient had any questions.    Plan: Care Coordinator will do no further outreaches at this time.    Génesis Mcgarry RN, BSN  Thoracic Surgery RN Care Coordinator

## 2022-08-02 ENCOUNTER — ANCILLARY PROCEDURE (OUTPATIENT)
Dept: GENERAL RADIOLOGY | Facility: CLINIC | Age: 37
End: 2022-08-02
Payer: MEDICAID

## 2022-08-02 ENCOUNTER — OFFICE VISIT (OUTPATIENT)
Dept: SURGERY | Facility: CLINIC | Age: 37
End: 2022-08-02
Attending: INTERNAL MEDICINE
Payer: MEDICAID

## 2022-08-02 VITALS
WEIGHT: 126 LBS | DIASTOLIC BLOOD PRESSURE: 80 MMHG | TEMPERATURE: 98.7 F | HEART RATE: 96 BPM | BODY MASS INDEX: 19.16 KG/M2 | OXYGEN SATURATION: 99 % | SYSTOLIC BLOOD PRESSURE: 117 MMHG

## 2022-08-02 DIAGNOSIS — J93.9 PNEUMOTHORAX ON RIGHT: ICD-10-CM

## 2022-08-02 DIAGNOSIS — J93.12 SECONDARY SPONTANEOUS PNEUMOTHORAX: Primary | ICD-10-CM

## 2022-08-02 LAB — RADIOLOGIST FLAGS: ABNORMAL

## 2022-08-02 PROCEDURE — G0463 HOSPITAL OUTPT CLINIC VISIT: HCPCS

## 2022-08-02 PROCEDURE — 71046 X-RAY EXAM CHEST 2 VIEWS: CPT | Performed by: RADIOLOGY

## 2022-08-02 PROCEDURE — 99214 OFFICE O/P EST MOD 30 MIN: CPT | Performed by: THORACIC SURGERY (CARDIOTHORACIC VASCULAR SURGERY)

## 2022-08-02 ASSESSMENT — PAIN SCALES - GENERAL: PAINLEVEL: NO PAIN (1)

## 2022-08-02 NOTE — PROGRESS NOTES
"THORACIC SURGERY FOLLOW UP VISIT    Dear Dr. Dean,  I saw Mr. Posada in follow up for recurrent spontaneous pneumothorax.  The clinical summary follows:    DIAGNOSIS  Recurrent bilateral spontaneous pneumothorax (left x3, right x2)    INTERVAL STUDIES  CT Chest 3/12/15:    Bilateral apical subpleural blebs    CXR 1/6/20:      CXR 8/2/2022     Small right apical pneumothorax    CT chest 7/20/22      ETOH: previously heavy use, 6 drinks/day 5-6 days/week, has \"cut back\"   TOB: former smoker, quit in 2016, previously 1 pack per day for 11 years.  He used to smoke marijuana, daily from age 19 until 2020.     SUBJECTIVE  Mario Posada is a 36 year old with multiple secondary spontaneous pneumothoraces.  He was first diagnosed back in 2015 with right sided pneumothorax, and has since had 4 recurrences, 3 on the left, once on the right. His last ED visit was 7/12, at which time we planned to proceed with surgery, but he was found to be COVID positive,so this was postponed. He has had a chronic cough related to his collapsed lung, but was otherwise asymptomatic. Since discharge two weeks ago, he has felt better. Chest pain is improved, only present with cough and occasionally when lying down. No symptoms of shortness of breath or dyspnea. He is not back to work yet due to his COVID restriction.     From a personal perspective, Mr. Posada lives with his parents and works in the kitchen at [employer].       IMPRESSION  (J93.9) Pneumothorax  (primary encounter diagnosis)  Mario Posada is a 36 year old man with recurrent right pneumothorax (prior left).  He has had 5 secondary spontaneous pneumothoraces, and would benefit from blebectomy and pleurodesis. We will start on the right side, and discussed potential planning to proceed with the left once he has recovered.       PLAN  I spent a total of 30 minutes with Mario Posada, more than 50% of which were spent in counseling, coordination of care, and face-to-face time. " I reviewed the plan as follows:  Surgery planned: Right VATS, bullectomu, mechanical and talc pleurodesis (6 weeks post COVID - first week of September)  Necessary Tests & Appointments: PAC  Pain Control Plan: Intercostal nerve block  Anticoagulation Plan: Heparin prophylaxis \    All questions were answered and the patient and present family were in agreement with the plan.  I appreciate the opportunity to participate in the care of your patient and will keep you updated.  Sincerely,    Katharina Chappell MD  PGY-3 General Surgery     Physician Attestation   I, Tim Whitt MD, saw this patient and agree with the findings and plan of care as documented in the note.      Items personally reviewed/procedural attestation: vitals, labs and imaging and agree with the interpretation documented in the note.    Tim Whitt MD

## 2022-08-02 NOTE — LETTER
Date:August 4, 2022      Patient was self referred, no letter generated. Do not send.        [facility] Information

## 2022-08-02 NOTE — LETTER
"    8/2/2022         RE: Mario Posada  [address] [facility] [address]        Dear Colleague,    Thank you for referring your patient, Mario Posada, to the [facility]. Please see a copy of my visit note below.    THORACIC SURGERY FOLLOW UP VISIT    Dear Dr. Dean,  I saw Mr. Posada in follow up for recurrent spontaneous pneumothorax.  The clinical summary follows:    DIAGNOSIS  Recurrent bilateral spontaneous pneumothorax (left x3, right x2)    INTERVAL STUDIES  CT Chest 3/12/15:    Bilateral apical subpleural blebs    CXR 1/6/20:      CXR 8/2/2022     Small right apical pneumothorax    CT chest 7/20/22      ETOH: previously heavy use, 6 drinks/day 5-6 days/week, has \"cut back\"   TOB: former smoker, quit in 2016, previously 1 pack per day for 11 years.  He used to smoke marijuana, daily from age 19 until 2020.     SUBJECTIVE  Mario Posada is a 36 year old with multiple secondary spontaneous pneumothoraces.  He was first diagnosed back in 2015 with right sided pneumothorax, and has since had 4 recurrences, 3 on the left, once on the right. His last ED visit was 7/12, at which time we planned to proceed with surgery, but he was found to be COVID positive,so this was postponed. He has had a chronic cough related to his collapsed lung, but was otherwise asymptomatic. Since discharge two weeks ago, he has felt better. Chest pain is improved, only present with cough and occasionally when lying down. No symptoms of shortness of breath or dyspnea. He is not back to work yet due to his COVID restriction.     From a personal perspective, Mr. Posada lives with his parents and works in the kitchen at [email or web address].       IMPRESSION  (J93.9) Pneumothorax  (primary encounter diagnosis)  Mario Posada is a 36 year old man with recurrent right pneumothorax (prior left).  He has had 5 secondary spontaneous pneumothoraces, and would benefit from blebectomy and pleurodesis. We will start on " the right side, and discussed potential planning to proceed with the left once he has recovered.       PLAN  I spent a total of 30 minutes with Mario Posada, more than 50% of which were spent in counseling, coordination of care, and face-to-face time. I reviewed the plan as follows:  Surgery planned: Right VATS, bullectomu, mechanical and talc pleurodesis (6 weeks post COVID - first week of September)  Necessary Tests & Appointments: PAC  Pain Control Plan: Intercostal nerve block  Anticoagulation Plan: Heparin prophylaxis \    All questions were answered and the patient and present family were in agreement with the plan.  I appreciate the opportunity to participate in the care of your patient and will keep you updated.  Sincerely,    Katharina Chappell MD  PGY-3 General Surgery     Physician Attestation   I, Tim Whitt MD, saw this patient and agree with the findings and plan of care as documented in the note.      Items personally reviewed/procedural attestation: vitals, labs and imaging and agree with the interpretation documented in the note.    Tim Whitt MD       Again, thank you for allowing me to participate in the care of your patient.        Sincerely,        Tim Whitt MD

## 2022-08-02 NOTE — NURSING NOTE
"Oncology Rooming Note    August 2, 2022 4:56 PM   Mario Posada is a 36 year old male who presents for:    Chief Complaint   Patient presents with     Oncology Clinic Visit     Pneumothorax, right     Initial Vitals: /80   Pulse 96   Temp 98.7  F (37.1  C) (Oral)   Wt 57.2 kg (126 lb)   SpO2 99%   BMI 19.16 kg/m   Estimated body mass index is 19.16 kg/m  as calculated from the following:    Height as of 7/20/22: 1.727 m (5' 8\").    Weight as of this encounter: 57.2 kg (126 lb). Body surface area is 1.66 meters squared.  No Pain (1) Comment: w/ coughing   No LMP for male patient.  Allergies reviewed: Yes  Medications reviewed: Yes    Medications: Medication refills not needed today.  Pharmacy name entered into [employer]:    [facility] - [place], [place] - [address] [facility]    Clinical concerns: none       Yuki Coffman CMA            "

## 2022-08-03 PROBLEM — J93.12 SECONDARY SPONTANEOUS PNEUMOTHORAX: Status: ACTIVE | Noted: 2022-07-20

## 2022-08-08 NOTE — ED TRIAGE NOTES
Triage Assessment     Row Name 07/20/22 1947       Triage Assessment (Adult)    Airway WDL WDL       Respiratory WDL    Respiratory WDL WDL       Skin Circulation/Temperature WDL    Skin Circulation/Temperature WDL WDL       Cardiac WDL    Cardiac WDL WDL       Peripheral/Neurovascular WDL    Peripheral Neurovascular WDL WDL       Cognitive/Neuro/Behavioral WDL    Cognitive/Neuro/Behavioral WDL WDL              
ED Triage Provider Note  [facility]  Encounter Date: Jul 20, 2022    History:  Chief Complaint   Patient presents with    pneumothorax with pain    Throacic Surg Consult     Mario Posada is a 36 year old male with a PMH significant for recurrent pneumothorax who presents to the ED with increasing chest pain. Diagnosed with pneumo on right side on Sunday evening, unable to place chest tube Eulalio evening, sent home with pain medication and follow up with cardiology. Patient reports he is here today as he ran out of pain medication.     Review of Systems:  CV: positive for chest pain    Exam:  /86 (BP Location: Left arm, Patient Position: Sitting, Cuff Size: Adult Regular)   Pulse 81   Temp 98.5  F (36.9  C) (Oral)   Resp 16   SpO2 99%   General: appears uncomfortable. Appears stated age.   Cardio: Regular rate, extremities well perfused  Resp: Normal work of breathing, grossly normal respiratory rate  Neuro: Alert. CN II-XII grossly intact. Grossly intact strength.       Medical Decision Making:  Patient arriving to the ED with problem as above. A medical screening exam was performed. The patient was evaluated by the emergency room physician who assumed care.         BELKIS Michele CNP on 7/20/2022 at 8:55 PM    
Pt arrives to ED with c/o pain related to pneumothorax seen in CXR on Sunday night. Thoracic surgery wanted patient to be seen in ED for possible intervention.      Triage Assessment     Row Name 07/20/22 1947       Triage Assessment (Adult)    Airway WDL WDL       Respiratory WDL    Respiratory WDL WDL       Skin Circulation/Temperature WDL    Skin Circulation/Temperature WDL WDL       Cardiac WDL    Cardiac WDL WDL       Peripheral/Neurovascular WDL    Peripheral Neurovascular WDL WDL       Cognitive/Neuro/Behavioral WDL    Cognitive/Neuro/Behavioral WDL WDL              
Vaccine status unknown

## 2022-08-19 ENCOUNTER — PATIENT OUTREACH (OUTPATIENT)
Dept: SURGERY | Facility: CLINIC | Age: 37
End: 2022-08-19

## 2022-08-19 DIAGNOSIS — J93.12 SECONDARY SPONTANEOUS PNEUMOTHORAX: Primary | ICD-10-CM

## 2022-08-19 DIAGNOSIS — Z01.818 PRE-OPERATIVE EXAM: ICD-10-CM

## 2022-08-19 NOTE — PROGRESS NOTES
Received voicemail message from patient. Patient calling to inquire when his surgery will be scheduled. Returned patient's call. Informed patient that our surgery scheduler is actively working on finalizing a surgery date for him. Informed him that she is hoping for 9/9/2022, but is waiting on final confirmation. Informed him that she will be out of the office through Wed 8/24, but will be in contact with him at the end of the week to provide him an update on his surgery date. Explained that Dr Whitt does want him to be seen in our PAC department for a pre-op H&P and our scheduling department will be in contact with him to schedule that appointment. Also informed him that Dr Whitt may want updated imaging prior to the surgery, and writer will let patient know if that is the case. Patient verbalized his understanding and appreciated the writer's call.     Génesis Mcgarry RN, BSN  Thoracic Surgery RN Care Coordinator

## 2022-08-22 ENCOUNTER — PREP FOR PROCEDURE (OUTPATIENT)
Dept: SURGERY | Facility: CLINIC | Age: 37
End: 2022-08-22

## 2022-08-22 DIAGNOSIS — J93.12 SECONDARY SPONTANEOUS PNEUMOTHORAX: Primary | ICD-10-CM

## 2022-08-22 RX ORDER — CEFAZOLIN SODIUM 2 G/50ML
2 SOLUTION INTRAVENOUS SEE ADMIN INSTRUCTIONS
Status: CANCELLED | OUTPATIENT
Start: 2022-08-22

## 2022-08-22 RX ORDER — HEPARIN SODIUM 5000 [USP'U]/.5ML
5000 INJECTION, SOLUTION INTRAVENOUS; SUBCUTANEOUS
Status: CANCELLED | OUTPATIENT
Start: 2022-08-22

## 2022-08-22 RX ORDER — CEFAZOLIN SODIUM 2 G/50ML
2 SOLUTION INTRAVENOUS
Status: CANCELLED | OUTPATIENT
Start: 2022-08-22

## 2022-08-23 ENCOUNTER — TELEPHONE (OUTPATIENT)
Dept: ONCOLOGY | Facility: CLINIC | Age: 37
End: 2022-08-23

## 2022-08-23 NOTE — TELEPHONE ENCOUNTER
Received voicemail from patient regarding rescheduling surgery with Dr. Whitt.    Stated he has a collapsed lung and was scheduled for surgery but tested positive for COVID. He was told he would be rescheduled for the 1st week of September and also scheduled for pre-operative physical.    Stated he has not heard from anyone regarding rescheduling.    He is requesting a call at [phone].    Will route to RNCC noted in chart and correct surgical coordinator.

## 2022-08-24 NOTE — TELEPHONE ENCOUNTER
FUTURE VISIT INFORMATION      SURGERY INFORMATION:    Date: TBD Dr. Whitt    Consult: ov     RECORDS REQUESTED FROM:         Most recent EKG+ [id number]

## 2022-08-29 ENCOUNTER — PATIENT OUTREACH (OUTPATIENT)
Dept: SURGERY | Facility: CLINIC | Age: 37
End: 2022-08-29

## 2022-08-29 NOTE — PROGRESS NOTES
Addendum:  Patient returned writer's call. Patient reports that he currently does not have Health Insurance. Verbalized that he is aware that he will have a large bill from this surgery. Writer provided Karlene's direct number. Instructed patient to call to review available options to him. Patient verbalized his understanding and appreciated writer's call.   Génesis Mcgarry RN, BSN  Thoracic Surgery RN Care Coordinator      Writer received call Karlene, financial counseling with  [employer]. Per Karlene, appears patient doesn't have health insurance. Reports that they have been trying to reach patient. Does not answer and not returning calls. Patient schedule for surgery on 9/8/2022. Karlene inquiring if scheduled surgery is urgent or if it can be postponed.   Writer attempted to reach-out to patient. No answer. Left voicemail message with request to return writer's call. Call back information provided.   Epic Message sent to Dr Whitt with above information.    Génesis Mcgarry RN, BSN  Thoracic Surgery RN Care Coordinator

## 2022-08-30 ENCOUNTER — TELEPHONE (OUTPATIENT)
Dept: SURGERY | Facility: CLINIC | Age: 37
End: 2022-08-30

## 2022-08-30 ENCOUNTER — PATIENT OUTREACH (OUTPATIENT)
Dept: SURGERY | Facility: CLINIC | Age: 37
End: 2022-08-30

## 2022-08-30 LAB
ABO/RH(D): NORMAL
ANTIBODY SCREEN: NEGATIVE
SPECIMEN EXPIRATION DATE: NORMAL

## 2022-08-30 NOTE — TELEPHONE ENCOUNTER
Called patient to reschedule surgery with Dr. Whitt.   Left a detail message to call back and reschedule.     Staci Jaffe  Gabriela-Op Coordinator  [phone]

## 2022-08-30 NOTE — PROGRESS NOTES
Received call from patient inquiring if he should still go to his pre-op appointment with PAC that is scheduled for tomorrow now that his surgery was postponed until 9/14. Patient reports that he has been talking to [facility] and they are working with him to see if he qualifies for [place] [employer] insurance. Informed patient that a PAC appointment is necessary before surgery can take place and H&P's are good for 30 days. Informed patient that it is his choice if he chooses to keep appointment or not, as visit will be out of pocket for him. Patient verbalized his understanding and stated that he plans on keeping the appointment. Patient appreciated writer's assistance.    Génesis Mgcarry RN, BSN  Thoracic Surgery RN Care Coordinator

## 2022-08-31 ENCOUNTER — LAB (OUTPATIENT)
Dept: LAB | Facility: CLINIC | Age: 37
End: 2022-08-31

## 2022-08-31 ENCOUNTER — HOSPITAL ENCOUNTER (OUTPATIENT)
Facility: CLINIC | Age: 37
Discharge: HOME | End: 2022-08-31
Attending: NURSE PRACTITIONER | Admitting: NURSE PRACTITIONER
Payer: MEDICAID

## 2022-08-31 ENCOUNTER — ANESTHESIA EVENT (OUTPATIENT)
Dept: SURGERY | Facility: CLINIC | Age: 37
End: 2022-08-31
Payer: MEDICAID

## 2022-08-31 ENCOUNTER — PRE VISIT (OUTPATIENT)
Dept: SURGERY | Facility: CLINIC | Age: 37
End: 2022-08-31

## 2022-08-31 ENCOUNTER — OFFICE VISIT (OUTPATIENT)
Dept: SURGERY | Facility: CLINIC | Age: 37
End: 2022-08-31
Attending: CLINICAL NURSE SPECIALIST
Payer: MEDICAID

## 2022-08-31 VITALS
SYSTOLIC BLOOD PRESSURE: 114 MMHG | OXYGEN SATURATION: 98 % | HEIGHT: 68 IN | HEART RATE: 78 BPM | TEMPERATURE: 98.4 F | WEIGHT: 130.6 LBS | BODY MASS INDEX: 19.79 KG/M2 | RESPIRATION RATE: 16 BRPM | DIASTOLIC BLOOD PRESSURE: 80 MMHG

## 2022-08-31 DIAGNOSIS — Z01.818 PRE-OPERATIVE EXAM: ICD-10-CM

## 2022-08-31 DIAGNOSIS — J93.12 SECONDARY SPONTANEOUS PNEUMOTHORAX: ICD-10-CM

## 2022-08-31 LAB
ANION GAP SERPL CALCULATED.3IONS-SCNC: 11 MMOL/L (ref 7–15)
BUN SERPL-MCNC: 7.8 MG/DL (ref 6–20)
CALCIUM SERPL-MCNC: 9.5 MG/DL (ref 8.6–10)
CHLORIDE SERPL-SCNC: 100 MMOL/L (ref 98–107)
CREAT SERPL-MCNC: 0.74 MG/DL (ref 0.67–1.17)
DEPRECATED HCO3 PLAS-SCNC: 28 MMOL/L (ref 22–29)
ERYTHROCYTE [DISTWIDTH] IN BLOOD BY AUTOMATED COUNT: 12 % (ref 10–15)
GFR SERPL CREATININE-BSD FRML MDRD: >90 ML/MIN/1.73M2
GLUCOSE SERPL-MCNC: 84 MG/DL (ref 70–99)
HCT VFR BLD AUTO: 42 % (ref 40–53)
HGB BLD-MCNC: 14.1 G/DL (ref 13.3–17.7)
MCH RBC QN AUTO: 30.9 PG (ref 26.5–33)
MCHC RBC AUTO-ENTMCNC: 33.6 G/DL (ref 31.5–36.5)
MCV RBC AUTO: 92 FL (ref 78–100)
PLATELET # BLD AUTO: 330 10E3/UL (ref 150–450)
POTASSIUM SERPL-SCNC: 4.1 MMOL/L (ref 3.4–5.3)
RBC # BLD AUTO: 4.56 10E6/UL (ref 4.4–5.9)
SODIUM SERPL-SCNC: 139 MMOL/L (ref 136–145)
WBC # BLD AUTO: 8.4 10E3/UL (ref 4–11)

## 2022-08-31 PROCEDURE — 86850 RBC ANTIBODY SCREEN: CPT

## 2022-08-31 PROCEDURE — 99000 SPECIMEN HANDLING OFFICE-LAB: CPT | Performed by: PATHOLOGY

## 2022-08-31 PROCEDURE — 80048 BASIC METABOLIC PNL TOTAL CA: CPT | Performed by: PATHOLOGY

## 2022-08-31 PROCEDURE — 36415 COLL VENOUS BLD VENIPUNCTURE: CPT | Performed by: PATHOLOGY

## 2022-08-31 PROCEDURE — 85027 COMPLETE CBC AUTOMATED: CPT | Performed by: PATHOLOGY

## 2022-08-31 PROCEDURE — 86901 BLOOD TYPING SEROLOGIC RH(D): CPT

## 2022-08-31 PROCEDURE — 99204 OFFICE O/P NEW MOD 45 MIN: CPT | Performed by: NURSE PRACTITIONER

## 2022-08-31 PROCEDURE — 86900 BLOOD TYPING SEROLOGIC ABO: CPT

## 2022-08-31 RX ORDER — CELECOXIB 200 MG/1
200 CAPSULE ORAL ONCE
Status: CANCELLED | OUTPATIENT
Start: 2022-08-31 | End: 2022-08-31

## 2022-08-31 RX ORDER — CHLORHEXIDINE GLUCONATE ORAL RINSE 1.2 MG/ML
15 SOLUTION DENTAL ONCE
Status: CANCELLED | OUTPATIENT
Start: 2022-08-31 | End: 2022-08-31

## 2022-08-31 RX ORDER — SODIUM CHLORIDE, SODIUM LACTATE, POTASSIUM CHLORIDE, CALCIUM CHLORIDE 600; 310; 30; 20 MG/100ML; MG/100ML; MG/100ML; MG/100ML
INJECTION, SOLUTION INTRAVENOUS CONTINUOUS
Status: CANCELLED | OUTPATIENT
Start: 2022-08-31

## 2022-08-31 RX ORDER — ACETAMINOPHEN 325 MG/1
975 TABLET ORAL ONCE
Status: CANCELLED | OUTPATIENT
Start: 2022-08-31 | End: 2022-08-31

## 2022-08-31 RX ORDER — LIDOCAINE 40 MG/G
CREAM TOPICAL
Status: CANCELLED | OUTPATIENT
Start: 2022-08-31

## 2022-08-31 RX ORDER — GABAPENTIN 300 MG/1
300 CAPSULE ORAL
Status: CANCELLED | OUTPATIENT
Start: 2022-08-31

## 2022-08-31 ASSESSMENT — PAIN SCALES - GENERAL: PAINLEVEL: NO PAIN (0)

## 2022-08-31 ASSESSMENT — LIFESTYLE VARIABLES: TOBACCO_USE: 1

## 2022-08-31 NOTE — H&P
Pre-Operative H & P     CC:  Preoperative exam to assess for increased cardiopulmonary risk while undergoing surgery and anesthesia.    Date of Encounter: 8/31/2022  Primary Care Physician:  No Ref-Primary, Physician     Reason for visit:   Encounter Diagnoses   Name Primary?     Secondary spontaneous pneumothorax      Pre-operative exam        HPI  Mario Posada is a 37 year old male who presents for pre-operative H & P in preparation for  Procedure Information     Case: [id number] Date/Time: 09/14/22 0730    Procedure: Right thoracoscopic bullectomy, mechanical and talc pleurodesis (Right Chest)    Anesthesia type: General    Diagnosis: Secondary spontaneous pneumothorax [J93.12]    Pre-op diagnosis: Secondary spontaneous pneumothorax [J93.12]    Location:  OR  /  OR    Providers: Tim Whitt MD        Mario Posada has a past medical history for alcohol-induced pancreatitis and secondary spontaneous pneumothorax.       Mr. Posada was seen by Dr. Whitt on 8/2/2022 in thoracic surgery clinic for recurrent bilateral spontaneous pneumothorax (left x3, right x2).  Dr. Whitt counseled him on his findings and recommendations.  Mr. Posada presents to the PAC in-person today in preparation for the above surgical intervention.       History is obtained from the patient and chart review    Hx of abnormal bleeding or anti-platelet use: denies      Past Medical History  No past medical history on file.    Past Surgical History  No past surgical history on file.    Prior to Admission Medications  Current Outpatient Medications   Medication Sig Dispense Refill     acetaminophen (TYLENOL) 325 MG tablet Take 2 tablets (650 mg) by mouth every 6 hours as needed for mild pain or fever       EPINEPHrine (PRIMATENE MIST IN) Inhale 1 puff into the lungs once as needed (wheezing)       IBUPROFEN PO Take 400 mg by mouth every 6 hours as needed for moderate pain       hydrOXYzine (ATARAX) 25 MG tablet Take 1 tablet  (25 mg) by mouth every 6 hours as needed for other or anxiety (adjuvant pain) (Patient not taking: Reported on 8/31/2022) 20 tablet 0     oxyCODONE-acetaminophen (PERCOCET) 5-325 MG tablet Take 1 tablet by mouth every 6 hours as needed for severe pain (Patient not taking: Reported on 8/31/2022) 6 tablet 0       Allergies  No Known Allergies    Social History  Social History     Socioeconomic History     Marital status: Single     Spouse name: Not on file     Number of children: Not on file     Years of education: Not on file     Highest education level: Not on file   Occupational History     Not on file   Tobacco Use     Smoking status: Former Smoker     Packs/day: 0.50     Years: 9.00     Pack years: 4.50     Types: Cigarettes     Smokeless tobacco: Never Used   Substance and Sexual Activity     Alcohol use: Yes     Drug use: Yes     Types: Marijuana     Sexual activity: Not on file   Other Topics Concern     Parent/sibling w/ CABG, MI or angioplasty before 65F 55M? Not Asked   Social History Narrative     Not on file     Social Determinants of Health     Financial Resource Strain: Not on file   Food Insecurity: Not on file   Transportation Needs: Not on file   Physical Activity: Not on file   Stress: Not on file   Social Connections: Not on file   Intimate Partner Violence: Not on file   Housing Stability: Not on file       Family History  Family History   Problem Relation Age of Onset     Unknown/Adopted Father        Review of Systems  The complete review of systems is negative other than noted in the HPI or here.   Anesthesia Evaluation   Pt has had prior anesthetic. Type: General and MAC.    No history of anesthetic complications       ROS/MED HX  ENT/Pulmonary: Comment: Recurrent pneumothorax.     (+) tobacco use (Quit 10 years ago. Smoked 1 PPD X 10 years. ), Past use, Intermittent, asthma Treatment: Inhaler prn,      Neurologic:  - neg neurologic ROS     Cardiovascular: Comment: Denies cardiac symptoms  "including chest pain, SOB, palpitations, syncope, SHIPLEY, orthopnea, or PND.      (+) -----Previous cardiac testing   Echo: Date: Results:    Stress Test: Date: Results:    ECG Reviewed: Date: 2017 Results:  Sinus Rhythm -With rate variation   cv = 10.  -Poor R-wave progression -nondiagnostic for this age.     ABNORMAL   Cath: Date: Results:      METS/Exercise Tolerance: >4 METS Comment: Works as a  at [employer] as a  and is on his feet 30+  Hours a week without symptoms.    Hematologic:  - neg hematologic  ROS     Musculoskeletal:  - neg musculoskeletal ROS     GI/Hepatic: Comment: Alcohol related pancreatitis.       Renal/Genitourinary:  - neg Renal ROS     Endo:  - neg endo ROS     Psychiatric/Substance Use:     (+) psychiatric history anxiety alcohol abuse (2-3 beers nightly. ) H/O chronic opiod use . Recreational drug usage: Cannabis (Occassionally uses marijuana. ).    Infectious Disease: Comment: COVID (+) 7/2022 symptoms of headache.  No residual.  - neg infectious disease ROS     Malignancy:  - neg malignancy ROS     Other:  - neg other ROS          /80 (BP Location: Right arm, Patient Position: Sitting, Cuff Size: Adult Regular)   Pulse 78   Temp 98.4  F (36.9  C) (Oral)   Resp 16   Ht 1.727 m (5' 8\")   Wt 59.2 kg (130 lb 9.6 oz)   SpO2 98%   BMI 19.86 kg/m      Physical Exam   Constitutional: Awake, alert, cooperative, no apparent distress, and appears stated age.  Eyes: Pupils equal, round and reactive to light, extra ocular muscles intact, sclera clear, conjunctiva normal.  HENT: Normocephalic, oral pharynx with moist mucus membranes, dentition completely wore down.  No goiter appreciated.   Respiratory: Clear to auscultation bilaterally, no crackles or wheezing.Diminished breath sounds in bases.   Cardiovascular: Regular rate and rhythm, normal S1 and S2, and no murmur noted.  Carotids +2, no bruits. No edema. Palpable pulses to radial  DP and PT arteries.   GI: Normal bowel sounds, " soft, non-distended, non-tender, no masses palpated, no hepatosplenomegaly.    Lymph/Hematologic: No cervical lymphadenopathy and no supraclavicular lymphadenopathy.  Genitourinary:  deferred  Skin: Warm and dry.  No rashes at anticipated surgical site.   Musculoskeletal: Full ROM of neck. There is no redness, warmth, or swelling of the joints. Gross motor strength is normal.    Neurologic: Awake, alert, oriented to name, place and time. Cranial nerves II-XII are grossly intact. Gait is normal.   Neuropsychiatric: Calm, cooperative. Normal affect.     Prior Labs/Diagnostic Studies   All labs and imaging personally reviewed   Lab Results   Component Value Date    WBC 4.4 07/20/2022    WBC 10.5 02/21/2016     Lab Results   Component Value Date    RBC 4.06 07/20/2022    RBC 4.11 02/21/2016     Lab Results   Component Value Date    HGB 12.4 07/20/2022    HGB 12.9 02/21/2016     Lab Results   Component Value Date    HCT 37.5 07/20/2022    HCT 38.8 02/21/2016     Lab Results   Component Value Date    MCV 92 07/20/2022    MCV 94 02/21/2016     Lab Results   Component Value Date    MCH 30.5 07/20/2022    MCH 31.4 02/21/2016     Lab Results   Component Value Date    MCHC 33.1 07/20/2022    MCHC 33.2 02/21/2016     Lab Results   Component Value Date    RDW 12.3 07/20/2022    RDW 11.4 02/21/2016     Lab Results   Component Value Date     07/20/2022     02/21/2016       Last Comprehensive Metabolic Panel:  Sodium   Date Value Ref Range Status   07/20/2022 137 136 - 145 mmol/L Final   02/23/2016 141 133 - 144 mmol/L Final     Potassium   Date Value Ref Range Status   07/20/2022 3.4 3.4 - 5.3 mmol/L Final   02/23/2016 3.8 3.4 - 5.3 mmol/L Final     Chloride   Date Value Ref Range Status   07/20/2022 100 98 - 107 mmol/L Final   02/23/2016 104 94 - 109 mmol/L Final     Carbon Dioxide   Date Value Ref Range Status   02/23/2016 31 20 - 32 mmol/L Final     Carbon Dioxide (CO2)   Date Value Ref Range Status   07/20/2022  27 22 - 29 mmol/L Final     Anion Gap   Date Value Ref Range Status   07/20/2022 10 7 - 15 mmol/L Final   02/23/2016 6 3 - 14 mmol/L Final     Glucose   Date Value Ref Range Status   07/20/2022 100 (H) 70 - 99 mg/dL Final   02/23/2016 108 (H) 70 - 99 mg/dL Final     Urea Nitrogen   Date Value Ref Range Status   07/20/2022 5.9 (L) 6.0 - 20.0 mg/dL Final   02/23/2016 1 (L) 7 - 30 mg/dL Final     Creatinine   Date Value Ref Range Status   07/20/2022 0.69 0.67 - 1.17 mg/dL Final   02/23/2016 0.68 0.66 - 1.25 mg/dL Final     GFR Estimate   Date Value Ref Range Status   07/20/2022 >90 >60 mL/min/1.73m2 Final     Comment:     Effective December 21, 2021 eGFRcr in adults is calculated using the 2021 CKD-EPI creatinine equation which includes age and gender (Dannielle et al., NEJ, DOI: 10.1056/FCLQcq9098724)   02/23/2016 >90  Non  GFR Calc   >60 mL/min/1.7m2 Final     Calcium   Date Value Ref Range Status   07/20/2022 8.9 8.6 - 10.0 mg/dL Final   02/23/2016 9.4 8.5 - 10.1 mg/dL Final     PROCEDURES     ECG 2017   Sinus  Rhythm  -With rate variation    cv = 10.   -Poor R-wave progression -nondiagnostic for this age.      ABNORMAL     The patient's records and results personally reviewed by this provider.     Outside records reviewed from: Care Everywhere    LAB/DIAGNOSTIC STUDIES TODAY:  CBC, BMP and T&S  Lab Results   Component Value Date    WBC 8.4 08/31/2022    WBC 10.5 02/21/2016     Lab Results   Component Value Date    RBC 4.56 08/31/2022    RBC 4.11 02/21/2016     Lab Results   Component Value Date    HGB 14.1 08/31/2022    HGB 12.9 02/21/2016     Lab Results   Component Value Date    HCT 42.0 08/31/2022    HCT 38.8 02/21/2016     Lab Results   Component Value Date    MCV 92 08/31/2022    MCV 94 02/21/2016     Lab Results   Component Value Date    MCH 30.9 08/31/2022    MCH 31.4 02/21/2016     Lab Results   Component Value Date    MCHC 33.6 08/31/2022    MCHC 33.2 02/21/2016     Lab Results   Component  Value Date    RDW 12.0 08/31/2022    RDW 11.4 02/21/2016     Lab Results   Component Value Date     08/31/2022     02/21/2016       Last Comprehensive Metabolic Panel:  Sodium   Date Value Ref Range Status   08/31/2022 139 136 - 145 mmol/L Final   02/23/2016 141 133 - 144 mmol/L Final     Potassium   Date Value Ref Range Status   08/31/2022 4.1 3.4 - 5.3 mmol/L Final   02/23/2016 3.8 3.4 - 5.3 mmol/L Final     Chloride   Date Value Ref Range Status   08/31/2022 100 98 - 107 mmol/L Final   02/23/2016 104 94 - 109 mmol/L Final     Carbon Dioxide   Date Value Ref Range Status   02/23/2016 31 20 - 32 mmol/L Final     Carbon Dioxide (CO2)   Date Value Ref Range Status   08/31/2022 28 22 - 29 mmol/L Final     Anion Gap   Date Value Ref Range Status   08/31/2022 11 7 - 15 mmol/L Final   02/23/2016 6 3 - 14 mmol/L Final     Glucose   Date Value Ref Range Status   08/31/2022 84 70 - 99 mg/dL Final   02/23/2016 108 (H) 70 - 99 mg/dL Final     Urea Nitrogen   Date Value Ref Range Status   08/31/2022 7.8 6.0 - 20.0 mg/dL Final   02/23/2016 1 (L) 7 - 30 mg/dL Final     Creatinine   Date Value Ref Range Status   08/31/2022 0.74 0.67 - 1.17 mg/dL Final   02/23/2016 0.68 0.66 - 1.25 mg/dL Final     GFR Estimate   Date Value Ref Range Status   08/31/2022 >90 >60 mL/min/1.73m2 Final     Comment:     Effective December 21, 2021 eGFRcr in adults is calculated using the 2021 CKD-EPI creatinine equation which includes age and gender (Dannielle et al., NEJM, DOI: 10.1056/ASPBlx7368828)   02/23/2016 >90  Non  GFR Calc   >60 mL/min/1.7m2 Final     Calcium   Date Value Ref Range Status   08/31/2022 9.5 8.6 - 10.0 mg/dL Final   02/23/2016 9.4 8.5 - 10.1 mg/dL Final         Assessment  Mario Posada is a 37 year old male seen as a PAC referral for risk assessment and optimization for anesthesia.    Plan/Recommendations  Pt will be optimized for the proposed procedure.  See below for details on the assessment, risk,  "and preoperative recommendations    NEUROLOGY  - No history of TIA, CVA or seizure  - Chronic Pain  On chronic opiates, morphine equivilant = 30 MME/Day   -Post Op delirium risk factors:  No risk identified    ENT  - No current airway concerns.  Will need to be reassessed day of surgery.  Mallampati: I  TM: > 3   Dentition in poor repair with all teeth broken off and worn to the gum line.     CARDIAC  - Denies known coronary artery disease.  Denies cardiac symptoms.  - METS (Metabolic Equivalents) >4 without symptoms.   - RCRI-Low risk: Class 2 0.9% complication rate            Total Score: 1    RCRI: High Risk Surgery        PULMONARY  - Recurrent spontaneous pneumothorax with above procedure planned.  Preoperative labs ordered. Scheduled for the above procedure in July 2022 but was COVID (+). Enhanced recovery pathway initiated.  LS diminished.   IS given to patient and instructed on us by PAC RN today.   EDGARDO Low Risk            Total Score: 1    EDGARDO: Male      - Asthma  Well controlled  - Tobacco History   History   Smoking Status     Former Smoker     Packs/day: 0.50     Years: 9.00     Types: Cigarettes   Smokeless Tobacco     Never Used       GI  - Alcohol induced pancreatitis X2.  Denies any recent symptoms.  Has cut back to 2-3 beers per evening.   - denies GERD  PONV Medium Risk  Total Score: 2           1 AN PONV: Patient is not a current smoker    1 AN PONV: Intended Post Op Opioids        /RENAL  Creatinine   Date Value Ref Range Status   07/20/2022 0.69 0.67 - 1.17 mg/dL Final   02/23/2016 0.68 0.66 - 1.25 mg/dL Final       ENDOCRINE    - BMI: Estimated body mass index is 19.86 kg/m  as calculated from the following:    Height as of this encounter: 1.727 m (5' 8\").    Weight as of this encounter: 59.2 kg (130 lb 9.6 oz).  Healthy Weight (BMI 18.5-24.9)  - No history of Diabetes Mellitus    HEME  VTE Low Risk 0.5%            Total Score: 2    VTE: Male      - No history of abnormal bleeding or " antiplatelet use.  - Denies h/o blood transfusion     PSYCH  - Anxiety, takes hydroxyzine as needed  - Uses marijuana occasionally.     MSKL  - Fragility score 0/5.         The patient is optimized for their procedure. AVS with information on surgery time/arrival time, meds and NPO status given by nursing staff. No further diagnostic testing indicated.      On the day of service:     Prep time: 14 minutes  Visit time: 16 minutes  Documentation time: 16 minutes  ------------------------------------------  Total time: 46 minutes      BELKIS Urban CNP  Preoperative Assessment Center  [facility]  Phone: [phone]  Fax: [phone]

## 2022-08-31 NOTE — PATIENT INSTRUCTIONS
Preparing for Your Surgery      Name:  Mario Posada   MRN:  [id number]   :  1985   Today's Date:  2022       Arriving for surgery:  Surgery date:  2022  Arrival time:  5:30 am    Restrictions due to COVID 19:       Effective 08/15/22, [facility] is implementing the following visitor policy:     1 person may accompany the patient through the Pre-Op process.      That same person may wait in the Surgery Waiting room, provided there is enough room  to social distance.           Visitors must wear a mask.      Visitors must not be ill.        Inpatients are allowed 2 visitors per day for the duration of their stay.      Children age 5 and older may visit.      Visiting hours are 8 am to 8 pm.        For everyone's health and safety, visitors are requested to remain in patients room as much as possible.      Please come to:     [facility] [address]  [address] [place] [address]    - ? parking is available in front of the hospital      -    Please proceed to [address] on the 3rd floor. [phone]?     - ?If you are in need of directions, wheelchair or escort please stop at the Information Desk in the lobby.     What can I eat or drink?  -  You may eat and drink normally for up to 8 hours before your surgery. (Until 22 at 11 pm)  -  You may have clear liquids until 2 hours before surgery. (Until 5:30 am arrival time)    Examples of clear liquids:  Water  Clear broth  Juices (apple, white grape, white cranberry  and cider) without pulp  Noncarbonated, powder based beverages  (lemonade and Oziel-Aid)  Sodas (Sprite, 7-Up, ginger ale and seltzer)  Coffee or tea (without milk or cream)  Gatorade    -  No Alcohol for at least 24 hours before surgery.     Which medicines can I take?    Hold Aspirin for 7 days before surgery.   Hold Multivitamins for 7 days before surgery.  Hold Supplements for 7 days before surgery.  Hold  Ibuprofen (Advil, Motrin) for 1 day before surgery--unless otherwise directed by surgeon.  Hold Naproxen (Aleve) for 4 days before surgery.        -  PLEASE TAKE these medications the day of surgery:  Primatene mist as needed   Acetaminophen if needed   Oxycodone if needed       How do I prepare myself?  - Please take 2 showers before surgery using Scrubcare or Hibiclens soap.    Use this soap only from the neck to your toes.     Leave the soap on your skin for one minute--then rinse thoroughly.      You may use your own shampoo and conditioner. No other hair products.   - Please remove all jewelry and body piercings.  - No lotions, deodorants or fragrance.  - No makeup or fingernail polish.   - Bring your ID and insurance card.    -If you have a Deep Brain Stimulator, Spinal Cord Stimulator, or any Neuro Stimulator device---you must bring the remote control to the hospital.      Questions or Concerns:    - For any questions regarding the day of surgery or your hospital stay, please contact the Pre Admission Nursing Office at [phone].       - If you have health changes between today and your surgery, please call your surgeon.       - For questions after surgery, please call your surgeons office.          Enhanced Recovery After Surgery     This is a team effort, including you, to get you back on your feet, eating and drinking      normally and out of the hospital as quickly as possible.  The goals are: 1) NO INFECTIONS and   2) RETURN TO NORMAL DIET    How can we achieve these goals?  1) STAY ACTIVE: Walk every day before your surgery; try to increase the amount every day.  Walk after surgery as much as you can-the nurses will help you.  Walking speeds healing and gets you home quicker, you heal better at home and have less risk of infection.     2) INCENTIVE SPIROMETER: Practice your incentive spirometer 4 times per day with 5 repetitions each time.  Using the incentive spirometer can strengthen your muscles  between your ribs and help you have a strong cough after surgery.  A more effective cough can help prevent problems with your lungs.    3) STAY HYDRATED: Drink clear liquids up until 2 hours before your surgery.     We would like you to purchase a drink such as Gatorade or Ensure Clear (not the milkshake type).  Drink this before bedtime and on the way into the hospital, drink between 8-10 ounces or until you feel hydrated.      Keeping well hydrated leads to your veins being plump, you wake up faster, and you are less likely to be nauseated. Start drinking water as soon as you can after surgery and advance to clear liquids and food as tolerated.  IV fluids contain salt, drinking fluids will minimize the amount of IV fluids you need and decrease the amount of salt you get.    The most common reason for the patient to be readmitted is dehydration. Staying hydrated after you go home from the hospital is very important.  Ensure or Ensure Clear are good options to keep you hydrated.     4) PAIN MANAGEMENT: If we minimize the amount of opioids and narcotics, and use regional blocks (which numb the area where your surgery is) along with oral pain medications; you will have less side effects of nausea and constipation. Narcotics can slow down your bowels and cause you to stay in the hospital longer.     .

## 2022-09-01 NOTE — TELEPHONE ENCOUNTER
Spoke with patient to schedule procedure with Dr. Tim Whitt    Procedure was scheduled on 09/14 at [facility] OR  Patient will have H&P with PAC Video     Patient is aware a COVID-19 test is needed before their procedure.   Patient had a positive COVID test -    Patient is aware a / is needed day of surgery.   Surgery letter was sent via Mail, patient has my direct contact information for any further questions.

## 2022-09-06 ENCOUNTER — TELEPHONE (OUTPATIENT)
Dept: SURGERY | Facility: CLINIC | Age: 37
End: 2022-09-06

## 2022-09-06 NOTE — TELEPHONE ENCOUNTER
I lvm for this patient, per Michelle Maradiaga; this patient's labs look good for surgery.I left our call back number for questions.    MARKEL Alexis LPN

## 2022-09-12 ENCOUNTER — PATIENT OUTREACH (OUTPATIENT)
Dept: SURGERY | Facility: CLINIC | Age: 37
End: 2022-09-12

## 2022-09-12 NOTE — PROGRESS NOTES
[facility]  Received voicemail message from patient asking for clarification if he needs to have a Covid PCR test prior to his surgery on Wednesday. States he's gotten mixed messages.     Clinical Data: Care Coordinator Outreach    Outreach attempted x 1.  Left message on patient's voicemail confirming that due to his recent Covid positive status, he does not need to have a pre-op Covid PCR done.  Call back information provided if he has any further questions.     Plan: Care Coordinator will do no further outreaches at this time.    Génesis Mcgarry RN, BSN  Thoracic Surgery RN Care Coordinator

## 2022-09-14 ENCOUNTER — ANESTHESIA (OUTPATIENT)
Dept: SURGERY | Facility: CLINIC | Age: 37
End: 2022-09-14
Payer: MEDICAID

## 2022-09-14 ENCOUNTER — APPOINTMENT (OUTPATIENT)
Dept: GENERAL RADIOLOGY | Facility: CLINIC | Age: 37
End: 2022-09-14
Attending: THORACIC SURGERY (CARDIOTHORACIC VASCULAR SURGERY)
Payer: MEDICAID

## 2022-09-14 ENCOUNTER — HOSPITAL ENCOUNTER (INPATIENT)
Facility: CLINIC | Age: 37
LOS: 3 days | Discharge: HOME | End: 2022-09-17
Attending: THORACIC SURGERY (CARDIOTHORACIC VASCULAR SURGERY) | Admitting: THORACIC SURGERY (CARDIOTHORACIC VASCULAR SURGERY)
Payer: MEDICAID

## 2022-09-14 DIAGNOSIS — J93.83 SPONTANEOUS PNEUMOTHORAX: Primary | ICD-10-CM

## 2022-09-14 LAB — GLUCOSE BLDC GLUCOMTR-MCNC: 131 MG/DL (ref 70–99)

## 2022-09-14 PROCEDURE — 250N000011 HC RX IP 250 OP 636: Performed by: THORACIC SURGERY (CARDIOTHORACIC VASCULAR SURGERY)

## 2022-09-14 PROCEDURE — 3E0L4GC INTRODUCTION OF OTHER THERAPEUTIC SUBSTANCE INTO PLEURAL CAVITY, PERCUTANEOUS ENDOSCOPIC APPROACH: ICD-10-PCS | Performed by: THORACIC SURGERY (CARDIOTHORACIC VASCULAR SURGERY)

## 2022-09-14 PROCEDURE — 258N000003 HC RX IP 258 OP 636: Performed by: NURSE PRACTITIONER

## 2022-09-14 PROCEDURE — 88307 TISSUE EXAM BY PATHOLOGIST: CPT | Mod: TC | Performed by: THORACIC SURGERY (CARDIOTHORACIC VASCULAR SURGERY)

## 2022-09-14 PROCEDURE — 370N000017 HC ANESTHESIA TECHNICAL FEE, PER MIN: Performed by: THORACIC SURGERY (CARDIOTHORACIC VASCULAR SURGERY)

## 2022-09-14 PROCEDURE — 71045 X-RAY EXAM CHEST 1 VIEW: CPT | Mod: 26 | Performed by: RADIOLOGY

## 2022-09-14 PROCEDURE — 250N000013 HC RX MED GY IP 250 OP 250 PS 637: Performed by: NURSE PRACTITIONER

## 2022-09-14 PROCEDURE — 999N000141 HC STATISTIC PRE-PROCEDURE NURSING ASSESSMENT: Performed by: THORACIC SURGERY (CARDIOTHORACIC VASCULAR SURGERY)

## 2022-09-14 PROCEDURE — 0BBC4ZZ EXCISION OF RIGHT UPPER LUNG LOBE, PERCUTANEOUS ENDOSCOPIC APPROACH: ICD-10-PCS | Performed by: THORACIC SURGERY (CARDIOTHORACIC VASCULAR SURGERY)

## 2022-09-14 PROCEDURE — 250N000009 HC RX 250: Performed by: THORACIC SURGERY (CARDIOTHORACIC VASCULAR SURGERY)

## 2022-09-14 PROCEDURE — 250N000013 HC RX MED GY IP 250 OP 250 PS 637: Performed by: ANESTHESIOLOGY

## 2022-09-14 PROCEDURE — 360N000077 HC SURGERY LEVEL 4, PER MIN: Performed by: THORACIC SURGERY (CARDIOTHORACIC VASCULAR SURGERY)

## 2022-09-14 PROCEDURE — 258N000003 HC RX IP 258 OP 636: Performed by: ANESTHESIOLOGY

## 2022-09-14 PROCEDURE — 99207 PR BUNDLED PROCEDURE IN GLOBAL PKG STATISTIC: CPT | Performed by: THORACIC SURGERY (CARDIOTHORACIC VASCULAR SURGERY)

## 2022-09-14 PROCEDURE — 250N000009 HC RX 250

## 2022-09-14 PROCEDURE — 250N000011 HC RX IP 250 OP 636: Performed by: ANESTHESIOLOGY

## 2022-09-14 PROCEDURE — 250N000011 HC RX IP 250 OP 636: Performed by: SURGERY

## 2022-09-14 PROCEDURE — 272N000001 HC OR GENERAL SUPPLY STERILE: Performed by: THORACIC SURGERY (CARDIOTHORACIC VASCULAR SURGERY)

## 2022-09-14 PROCEDURE — 250N000013 HC RX MED GY IP 250 OP 250 PS 637: Performed by: SURGERY

## 2022-09-14 PROCEDURE — 710N000010 HC RECOVERY PHASE 1, LEVEL 2, PER MIN: Performed by: THORACIC SURGERY (CARDIOTHORACIC VASCULAR SURGERY)

## 2022-09-14 PROCEDURE — 120N000002 HC R&B MED SURG/OB UMMC

## 2022-09-14 PROCEDURE — 258N000003 HC RX IP 258 OP 636

## 2022-09-14 PROCEDURE — 999N000065 XR CHEST PORT 1 VIEW

## 2022-09-14 PROCEDURE — 250N000011 HC RX IP 250 OP 636

## 2022-09-14 PROCEDURE — 250N000025 HC SEVOFLURANE, PER MIN: Performed by: THORACIC SURGERY (CARDIOTHORACIC VASCULAR SURGERY)

## 2022-09-14 RX ORDER — FENTANYL CITRATE 50 UG/ML
25 INJECTION, SOLUTION INTRAMUSCULAR; INTRAVENOUS EVERY 5 MIN PRN
Status: DISCONTINUED | OUTPATIENT
Start: 2022-09-14 | End: 2022-09-14 | Stop reason: HOSPADM

## 2022-09-14 RX ORDER — HYDROMORPHONE HCL IN WATER/PF 6 MG/30 ML
0.4 PATIENT CONTROLLED ANALGESIA SYRINGE INTRAVENOUS
Status: DISCONTINUED | OUTPATIENT
Start: 2022-09-14 | End: 2022-09-17 | Stop reason: HOSPADM

## 2022-09-14 RX ORDER — ONDANSETRON 4 MG/1
4 TABLET, ORALLY DISINTEGRATING ORAL EVERY 6 HOURS PRN
Status: DISCONTINUED | OUTPATIENT
Start: 2022-09-14 | End: 2022-09-17 | Stop reason: HOSPADM

## 2022-09-14 RX ORDER — ONDANSETRON 2 MG/ML
4 INJECTION INTRAMUSCULAR; INTRAVENOUS EVERY 30 MIN PRN
Status: DISCONTINUED | OUTPATIENT
Start: 2022-09-14 | End: 2022-09-14 | Stop reason: HOSPADM

## 2022-09-14 RX ORDER — CHLORHEXIDINE GLUCONATE ORAL RINSE 1.2 MG/ML
15 SOLUTION DENTAL ONCE
Status: COMPLETED | OUTPATIENT
Start: 2022-09-14 | End: 2022-09-14

## 2022-09-14 RX ORDER — ACETAMINOPHEN 325 MG/1
975 TABLET ORAL EVERY 6 HOURS
Status: COMPLETED | OUTPATIENT
Start: 2022-09-14 | End: 2022-09-17

## 2022-09-14 RX ORDER — ONDANSETRON 2 MG/ML
4 INJECTION INTRAMUSCULAR; INTRAVENOUS EVERY 6 HOURS PRN
Status: DISCONTINUED | OUTPATIENT
Start: 2022-09-14 | End: 2022-09-17 | Stop reason: HOSPADM

## 2022-09-14 RX ORDER — OXYCODONE HYDROCHLORIDE 5 MG/1
5 TABLET ORAL EVERY 4 HOURS PRN
Status: DISCONTINUED | OUTPATIENT
Start: 2022-09-14 | End: 2022-09-17 | Stop reason: HOSPADM

## 2022-09-14 RX ORDER — SODIUM CHLORIDE, SODIUM LACTATE, POTASSIUM CHLORIDE, CALCIUM CHLORIDE 600; 310; 30; 20 MG/100ML; MG/100ML; MG/100ML; MG/100ML
INJECTION, SOLUTION INTRAVENOUS CONTINUOUS
Status: DISCONTINUED | OUTPATIENT
Start: 2022-09-14 | End: 2022-09-14 | Stop reason: HOSPADM

## 2022-09-14 RX ORDER — LIDOCAINE 40 MG/G
CREAM TOPICAL
Status: DISCONTINUED | OUTPATIENT
Start: 2022-09-14 | End: 2022-09-14 | Stop reason: HOSPADM

## 2022-09-14 RX ORDER — OXYCODONE HYDROCHLORIDE 5 MG/1
5 TABLET ORAL EVERY 4 HOURS PRN
Status: DISCONTINUED | OUTPATIENT
Start: 2022-09-14 | End: 2022-09-14 | Stop reason: HOSPADM

## 2022-09-14 RX ORDER — NALOXONE HYDROCHLORIDE 0.4 MG/ML
0.4 INJECTION, SOLUTION INTRAMUSCULAR; INTRAVENOUS; SUBCUTANEOUS
Status: DISCONTINUED | OUTPATIENT
Start: 2022-09-14 | End: 2022-09-17 | Stop reason: HOSPADM

## 2022-09-14 RX ORDER — ONDANSETRON 2 MG/ML
INJECTION INTRAMUSCULAR; INTRAVENOUS PRN
Status: DISCONTINUED | OUTPATIENT
Start: 2022-09-14 | End: 2022-09-14

## 2022-09-14 RX ORDER — GABAPENTIN 300 MG/1
300 CAPSULE ORAL
Status: COMPLETED | OUTPATIENT
Start: 2022-09-14 | End: 2022-09-14

## 2022-09-14 RX ORDER — EPHEDRINE SULFATE 50 MG/ML
INJECTION, SOLUTION INTRAMUSCULAR; INTRAVENOUS; SUBCUTANEOUS PRN
Status: DISCONTINUED | OUTPATIENT
Start: 2022-09-14 | End: 2022-09-14

## 2022-09-14 RX ORDER — ENOXAPARIN SODIUM 100 MG/ML
40 INJECTION SUBCUTANEOUS EVERY 24 HOURS
Status: DISCONTINUED | OUTPATIENT
Start: 2022-09-15 | End: 2022-09-17 | Stop reason: HOSPADM

## 2022-09-14 RX ORDER — OXYCODONE HYDROCHLORIDE 10 MG/1
10 TABLET ORAL EVERY 4 HOURS PRN
Status: DISCONTINUED | OUTPATIENT
Start: 2022-09-14 | End: 2022-09-17 | Stop reason: HOSPADM

## 2022-09-14 RX ORDER — HYDROMORPHONE HYDROCHLORIDE 1 MG/ML
0.2 INJECTION, SOLUTION INTRAMUSCULAR; INTRAVENOUS; SUBCUTANEOUS EVERY 5 MIN PRN
Status: DISCONTINUED | OUTPATIENT
Start: 2022-09-14 | End: 2022-09-14 | Stop reason: HOSPADM

## 2022-09-14 RX ORDER — FENTANYL CITRATE 50 UG/ML
INJECTION, SOLUTION INTRAMUSCULAR; INTRAVENOUS PRN
Status: DISCONTINUED | OUTPATIENT
Start: 2022-09-14 | End: 2022-09-14

## 2022-09-14 RX ORDER — CEFAZOLIN SODIUM/WATER 2 G/20 ML
2 SYRINGE (ML) INTRAVENOUS SEE ADMIN INSTRUCTIONS
Status: DISCONTINUED | OUTPATIENT
Start: 2022-09-14 | End: 2022-09-14 | Stop reason: HOSPADM

## 2022-09-14 RX ORDER — BUPIVACAINE HYDROCHLORIDE 2.5 MG/ML
INJECTION, SOLUTION EPIDURAL; INFILTRATION; INTRACAUDAL PRN
Status: DISCONTINUED | OUTPATIENT
Start: 2022-09-14 | End: 2022-09-14 | Stop reason: HOSPADM

## 2022-09-14 RX ORDER — NALOXONE HYDROCHLORIDE 0.4 MG/ML
0.2 INJECTION, SOLUTION INTRAMUSCULAR; INTRAVENOUS; SUBCUTANEOUS
Status: DISCONTINUED | OUTPATIENT
Start: 2022-09-14 | End: 2022-09-17 | Stop reason: HOSPADM

## 2022-09-14 RX ORDER — CEFAZOLIN SODIUM/WATER 2 G/20 ML
2 SYRINGE (ML) INTRAVENOUS
Status: COMPLETED | OUTPATIENT
Start: 2022-09-14 | End: 2022-09-14

## 2022-09-14 RX ORDER — HYDROMORPHONE HCL IN WATER/PF 6 MG/30 ML
0.2 PATIENT CONTROLLED ANALGESIA SYRINGE INTRAVENOUS
Status: DISCONTINUED | OUTPATIENT
Start: 2022-09-14 | End: 2022-09-17 | Stop reason: HOSPADM

## 2022-09-14 RX ORDER — PROPOFOL 10 MG/ML
INJECTION, EMULSION INTRAVENOUS PRN
Status: DISCONTINUED | OUTPATIENT
Start: 2022-09-14 | End: 2022-09-14

## 2022-09-14 RX ORDER — CELECOXIB 200 MG/1
200 CAPSULE ORAL ONCE
Status: COMPLETED | OUTPATIENT
Start: 2022-09-14 | End: 2022-09-14

## 2022-09-14 RX ORDER — LIDOCAINE HYDROCHLORIDE 20 MG/ML
INJECTION, SOLUTION INFILTRATION; PERINEURAL PRN
Status: DISCONTINUED | OUTPATIENT
Start: 2022-09-14 | End: 2022-09-14

## 2022-09-14 RX ORDER — ACETAMINOPHEN 325 MG/1
975 TABLET ORAL ONCE
Status: COMPLETED | OUTPATIENT
Start: 2022-09-14 | End: 2022-09-14

## 2022-09-14 RX ORDER — AMOXICILLIN 250 MG
1 CAPSULE ORAL 2 TIMES DAILY
Status: DISCONTINUED | OUTPATIENT
Start: 2022-09-14 | End: 2022-09-17 | Stop reason: HOSPADM

## 2022-09-14 RX ORDER — DEXAMETHASONE SODIUM PHOSPHATE 4 MG/ML
INJECTION, SOLUTION INTRA-ARTICULAR; INTRALESIONAL; INTRAMUSCULAR; INTRAVENOUS; SOFT TISSUE PRN
Status: DISCONTINUED | OUTPATIENT
Start: 2022-09-14 | End: 2022-09-14

## 2022-09-14 RX ORDER — ONDANSETRON 4 MG/1
4 TABLET, ORALLY DISINTEGRATING ORAL EVERY 30 MIN PRN
Status: DISCONTINUED | OUTPATIENT
Start: 2022-09-14 | End: 2022-09-14 | Stop reason: HOSPADM

## 2022-09-14 RX ORDER — METHOCARBAMOL 750 MG/1
750 TABLET, FILM COATED ORAL EVERY 6 HOURS PRN
Status: DISCONTINUED | OUTPATIENT
Start: 2022-09-14 | End: 2022-09-17 | Stop reason: HOSPADM

## 2022-09-14 RX ORDER — POLYETHYLENE GLYCOL 3350 17 G/17G
17 POWDER, FOR SOLUTION ORAL DAILY
Status: DISCONTINUED | OUTPATIENT
Start: 2022-09-15 | End: 2022-09-17 | Stop reason: HOSPADM

## 2022-09-14 RX ORDER — HEPARIN SODIUM 5000 [USP'U]/.5ML
5000 INJECTION, SOLUTION INTRAVENOUS; SUBCUTANEOUS
Status: COMPLETED | OUTPATIENT
Start: 2022-09-14 | End: 2022-09-14

## 2022-09-14 RX ADMIN — FENTANYL CITRATE 100 MCG: 50 INJECTION, SOLUTION INTRAMUSCULAR; INTRAVENOUS at 07:33

## 2022-09-14 RX ADMIN — MIDAZOLAM 2 MG: 1 INJECTION INTRAMUSCULAR; INTRAVENOUS at 07:21

## 2022-09-14 RX ADMIN — PHENYLEPHRINE HYDROCHLORIDE 150 MCG: 10 INJECTION INTRAVENOUS at 09:32

## 2022-09-14 RX ADMIN — SODIUM CHLORIDE, POTASSIUM CHLORIDE, SODIUM LACTATE AND CALCIUM CHLORIDE: 600; 310; 30; 20 INJECTION, SOLUTION INTRAVENOUS at 10:06

## 2022-09-14 RX ADMIN — FENTANYL CITRATE 25 MCG: 50 INJECTION, SOLUTION INTRAMUSCULAR; INTRAVENOUS at 10:04

## 2022-09-14 RX ADMIN — LIDOCAINE HYDROCHLORIDE 100 MG: 20 INJECTION, SOLUTION INFILTRATION; PERINEURAL at 07:33

## 2022-09-14 RX ADMIN — HYDROMORPHONE HYDROCHLORIDE 0.2 MG: 1 INJECTION, SOLUTION INTRAMUSCULAR; INTRAVENOUS; SUBCUTANEOUS at 11:28

## 2022-09-14 RX ADMIN — DEXAMETHASONE SODIUM PHOSPHATE 8 MG: 4 INJECTION, SOLUTION INTRA-ARTICULAR; INTRALESIONAL; INTRAMUSCULAR; INTRAVENOUS; SOFT TISSUE at 08:15

## 2022-09-14 RX ADMIN — PROPOFOL 50 MG: 10 INJECTION, EMULSION INTRAVENOUS at 07:37

## 2022-09-14 RX ADMIN — ACETAMINOPHEN 650 MG: 325 TABLET, FILM COATED ORAL at 06:42

## 2022-09-14 RX ADMIN — HYDROMORPHONE HYDROCHLORIDE 0.2 MG: 1 INJECTION, SOLUTION INTRAMUSCULAR; INTRAVENOUS; SUBCUTANEOUS at 10:55

## 2022-09-14 RX ADMIN — SENNOSIDES AND DOCUSATE SODIUM 1 TABLET: 8.6; 5 TABLET ORAL at 19:14

## 2022-09-14 RX ADMIN — Medication 2 G: at 08:00

## 2022-09-14 RX ADMIN — HYDROMORPHONE HYDROCHLORIDE 0.2 MG: 1 INJECTION, SOLUTION INTRAMUSCULAR; INTRAVENOUS; SUBCUTANEOUS at 10:36

## 2022-09-14 RX ADMIN — ACETAMINOPHEN 975 MG: 325 TABLET, FILM COATED ORAL at 19:14

## 2022-09-14 RX ADMIN — SODIUM CHLORIDE, POTASSIUM CHLORIDE, SODIUM LACTATE AND CALCIUM CHLORIDE: 600; 310; 30; 20 INJECTION, SOLUTION INTRAVENOUS at 07:27

## 2022-09-14 RX ADMIN — Medication 5 MG: at 09:34

## 2022-09-14 RX ADMIN — CHLORHEXIDINE GLUCONATE 0.12% ORAL RINSE 15 ML: 1.2 LIQUID ORAL at 06:15

## 2022-09-14 RX ADMIN — METHOCARBAMOL 750 MG: 750 TABLET ORAL at 13:03

## 2022-09-14 RX ADMIN — HYDROMORPHONE HYDROCHLORIDE 0.5 MG: 1 INJECTION, SOLUTION INTRAMUSCULAR; INTRAVENOUS; SUBCUTANEOUS at 09:42

## 2022-09-14 RX ADMIN — HEPARIN SODIUM 5000 UNITS: 5000 INJECTION, SOLUTION INTRAVENOUS; SUBCUTANEOUS at 06:58

## 2022-09-14 RX ADMIN — Medication 50 MG: at 07:33

## 2022-09-14 RX ADMIN — ACETAMINOPHEN 975 MG: 325 TABLET, FILM COATED ORAL at 13:03

## 2022-09-14 RX ADMIN — ONDANSETRON 4 MG: 2 INJECTION INTRAMUSCULAR; INTRAVENOUS at 09:27

## 2022-09-14 RX ADMIN — FENTANYL CITRATE 50 MCG: 50 INJECTION, SOLUTION INTRAMUSCULAR; INTRAVENOUS at 08:18

## 2022-09-14 RX ADMIN — CELECOXIB 200 MG: 200 CAPSULE ORAL at 06:12

## 2022-09-14 RX ADMIN — HYDROMORPHONE HYDROCHLORIDE 0.4 MG: 0.2 INJECTION, SOLUTION INTRAMUSCULAR; INTRAVENOUS; SUBCUTANEOUS at 22:37

## 2022-09-14 RX ADMIN — FENTANYL CITRATE 50 MCG: 50 INJECTION, SOLUTION INTRAMUSCULAR; INTRAVENOUS at 09:03

## 2022-09-14 RX ADMIN — HYDROMORPHONE HYDROCHLORIDE 0.2 MG: 1 INJECTION, SOLUTION INTRAMUSCULAR; INTRAVENOUS; SUBCUTANEOUS at 10:13

## 2022-09-14 RX ADMIN — PROPOFOL 200 MG: 10 INJECTION, EMULSION INTRAVENOUS at 07:33

## 2022-09-14 RX ADMIN — PHENYLEPHRINE HYDROCHLORIDE 50 MCG: 10 INJECTION INTRAVENOUS at 08:51

## 2022-09-14 RX ADMIN — FENTANYL CITRATE 25 MCG: 50 INJECTION, SOLUTION INTRAMUSCULAR; INTRAVENOUS at 09:53

## 2022-09-14 RX ADMIN — Medication 20 MG: at 09:05

## 2022-09-14 RX ADMIN — FENTANYL CITRATE 25 MCG: 50 INJECTION, SOLUTION INTRAMUSCULAR; INTRAVENOUS at 10:09

## 2022-09-14 RX ADMIN — FENTANYL CITRATE 25 MCG: 50 INJECTION, SOLUTION INTRAMUSCULAR; INTRAVENOUS at 09:58

## 2022-09-14 RX ADMIN — OXYCODONE HYDROCHLORIDE 10 MG: 10 TABLET ORAL at 19:40

## 2022-09-14 RX ADMIN — GABAPENTIN 300 MG: 300 CAPSULE ORAL at 06:16

## 2022-09-14 RX ADMIN — PHENYLEPHRINE HYDROCHLORIDE 100 MCG: 10 INJECTION INTRAVENOUS at 08:58

## 2022-09-14 RX ADMIN — HYDROMORPHONE HYDROCHLORIDE 0.4 MG: 0.2 INJECTION, SOLUTION INTRAMUSCULAR; INTRAVENOUS; SUBCUTANEOUS at 17:57

## 2022-09-14 RX ADMIN — PHENYLEPHRINE HYDROCHLORIDE 200 MCG: 10 INJECTION INTRAVENOUS at 09:15

## 2022-09-14 RX ADMIN — HYDROMORPHONE HYDROCHLORIDE 0.4 MG: 0.2 INJECTION, SOLUTION INTRAMUSCULAR; INTRAVENOUS; SUBCUTANEOUS at 13:03

## 2022-09-14 RX ADMIN — Medication 50 MG: at 08:18

## 2022-09-14 RX ADMIN — PHENYLEPHRINE HYDROCHLORIDE 100 MCG: 10 INJECTION INTRAVENOUS at 09:28

## 2022-09-14 RX ADMIN — OXYCODONE HYDROCHLORIDE 10 MG: 10 TABLET ORAL at 10:21

## 2022-09-14 RX ADMIN — SUGAMMADEX 200 MG: 100 INJECTION, SOLUTION INTRAVENOUS at 09:35

## 2022-09-14 RX ADMIN — HYDROMORPHONE HYDROCHLORIDE 0.2 MG: 1 INJECTION, SOLUTION INTRAMUSCULAR; INTRAVENOUS; SUBCUTANEOUS at 10:05

## 2022-09-14 ASSESSMENT — ACTIVITIES OF DAILY LIVING (ADL)
ADLS_ACUITY_SCORE: 20
ADLS_ACUITY_SCORE: 25
ADLS_ACUITY_SCORE: 20
ADLS_ACUITY_SCORE: 20
ADLS_ACUITY_SCORE: 25
ADLS_ACUITY_SCORE: 25
ADLS_ACUITY_SCORE: 20

## 2022-09-14 NOTE — ANESTHESIA POSTPROCEDURE EVALUATION
Patient: Mario Posada    Procedure: Procedure(s):  Right thoracoscopic bullectomy, mechanical and talc pleurodesis       Anesthesia Type:  General    Note:  Disposition: Inpatient   Postop Pain Control: Uneventful            Sign Out: Well controlled pain   PONV: No   Neuro/Psych: Uneventful            Sign Out: Acceptable/Baseline neuro status   Airway/Respiratory: Uneventful            Sign Out: Acceptable/Baseline resp. status   CV/Hemodynamics: Uneventful            Sign Out: Acceptable CV status; No obvious hypovolemia; No obvious fluid overload   Other NRE: NONE   DID A NON-ROUTINE EVENT OCCUR? No           Last vitals:  Vitals Value Taken Time   /59 09/14/22 1130   Temp 37.1  C (98.8  F) 09/14/22 1015   Pulse 86 09/14/22 1135   Resp 0 09/14/22 1135   SpO2 95 % 09/14/22 1135   Vitals shown include unvalidated device data.    Electronically Signed By: Coretta Olivares MD  September 14, 2022  11:36 AM

## 2022-09-14 NOTE — ANESTHESIA POSTPROCEDURE EVALUATION
Patient: Mario Posada    Procedure: Procedure(s):  Right thoracoscopic bullectomy, mechanical and talc pleurodesis       Anesthesia Type:  General    Note:  Disposition: Inpatient   Postop Pain Control: Uneventful            Sign Out: ONGOING pain issues   PONV: No   Neuro/Psych: Uneventful            Sign Out: Acceptable/Baseline neuro status   Airway/Respiratory: Uneventful            Sign Out: Acceptable/Baseline resp. status   CV/Hemodynamics: Uneventful            Sign Out: Acceptable CV status; No obvious hypovolemia; No obvious fluid overload   Other NRE: NONE   DID A NON-ROUTINE EVENT OCCUR? No    Event details/Postop Comments:  Pain tolerable at rest but coughing increases pain. Able to rest, vitals stable           Last vitals:  Vitals Value Taken Time   /59 09/14/22 1115   Temp 37.1  C (98.8  F) 09/14/22 1015   Pulse 88 09/14/22 1125   Resp 0 09/14/22 1125   SpO2 97 % 09/14/22 1125   Vitals shown include unvalidated device data.    Electronically Signed By: Spencer Woodward MD  September 14, 2022  11:26 AM

## 2022-09-14 NOTE — PROGRESS NOTES
Patient tested positive for COVID 7/22/22, patient considered recovered COVID status. Travel screen negative.

## 2022-09-14 NOTE — ANESTHESIA PREPROCEDURE EVALUATION
Anesthesia Pre-Procedure Evaluation    Patient: Mario Posada   MRN: [id number] : 1985        Procedure : Procedure(s):  Right thoracoscopic bullectomy, mechanical and talc pleurodesis          Past Medical History:   Diagnosis Date     Alcohol-induced pancreatitis      Anxiety      Cannabis abuse      Chronic pain     chronic pain from chronic cough right neck/upper back     Primary spontaneous pneumothorax     7      History reviewed. No pertinent surgical history.   No Known Allergies   Social History     Tobacco Use     Smoking status: Former Smoker     Packs/day: 0.50     Years: 9.00     Pack years: 4.50     Types: Cigarettes     Start date: 2008     Quit date: 2011     Years since quittin.7     Smokeless tobacco: Never Used   Substance Use Topics     Alcohol use: Yes     Comment: 2-4 drinks per week      Wt Readings from Last 1 Encounters:   22 57.2 kg (126 lb 1.7 oz)        Anesthesia Evaluation   Pt has not had prior anesthetic         ROS/MED HX  ENT/Pulmonary: Comment: Bilateral spontaneous pneumothorax      (+) Intermittent, asthma     Neurologic:  - neg neurologic ROS     Cardiovascular:       METS/Exercise Tolerance: >4 METS    Hematologic:  - neg hematologic  ROS     Musculoskeletal:  - neg musculoskeletal ROS     GI/Hepatic: Comment: History of alcoholic pancreatitis      Renal/Genitourinary:  - neg Renal ROS     Endo:  - neg endo ROS     Psychiatric/Substance Use:     (+) Recreational drug usage: Cannabis.    Infectious Disease:  - neg infectious disease ROS     Malignancy:  - neg malignancy ROS     Other:            Physical Exam    Airway        Mallampati: II   TM distance: > 3 FB   Neck ROM: full   Mouth opening: > 3 cm    Respiratory Devices and Support         Dental     Comment: Poor dentition, most teeth missing        Cardiovascular   cardiovascular exam normal          Pulmonary           (+) decreased breath sounds           OUTSIDE LABS:  CBC:   Lab Results    Component Value Date    WBC 8.4 08/31/2022    WBC 4.4 07/20/2022    HGB 14.1 08/31/2022    HGB 12.4 (L) 07/20/2022    HCT 42.0 08/31/2022    HCT 37.5 (L) 07/20/2022     08/31/2022     07/20/2022     BMP:   Lab Results   Component Value Date     08/31/2022     07/20/2022    POTASSIUM 4.1 08/31/2022    POTASSIUM 3.4 07/20/2022    CHLORIDE 100 08/31/2022    CHLORIDE 100 07/20/2022    CO2 28 08/31/2022    CO2 27 07/20/2022    BUN 7.8 08/31/2022    BUN 5.9 (L) 07/20/2022    CR 0.74 08/31/2022    CR 0.69 07/20/2022     (H) 09/14/2022    GLC 84 08/31/2022     COAGS:   Lab Results   Component Value Date    INR 1.04 02/19/2016     POC:   Lab Results   Component Value Date    BGM 86 02/21/2016     HEPATIC:   Lab Results   Component Value Date    ALBUMIN 3.6 02/22/2016    PROTTOTAL 7.2 02/22/2016    ALT 51 02/22/2016    AST 39 02/22/2016    ALKPHOS 85 02/22/2016    BILITOTAL 0.8 02/22/2016    WILLIAM 18 02/21/2016     OTHER:   Lab Results   Component Value Date    PH 8.0 (H) 12/11/2001    LACT 0.8 02/18/2016    LARY 9.5 08/31/2022    PHOS 4.2 07/20/2022    MAG 2.2 07/20/2022    LIPASE 2,370 (H) 02/24/2016       Anesthesia Plan    ASA Status:  2      Anesthesia Type: General.     - Airway: ETT   Induction: Intravenous.   Maintenance: Balanced.   Techniques and Equipment:     - Lines/Monitors: 2nd IV     Consents    Anesthesia Plan(s) and associated risks, benefits, and realistic alternatives discussed. Questions answered and patient/representative(s) expressed understanding.    - Discussed:     - Discussed with:  Patient      - Extended Intubation/Ventilatory Support Discussed: Yes.      - Patient is DNR/DNI Status: No    Use of blood products discussed: Yes.     - Discussed with: Patient.     - Consented: consented to blood products            Reason for refusal: other.     Postoperative Care    Pain management: Multi-modal analgesia.   PONV prophylaxis: Ondansetron (or other 5HT-3),  Dexamethasone or Solumedrol     Comments:              PAC Discussion and Assessment    ASA Classification: 2    Anesthetic techniques and relevant risks discussed: GA  Invasive monitoring and risk discussed: Yes    Possibility and Risk of blood transfusion discussed: Yes  NPO instructions given: No solids 6 hours before surgery, stop clear liquids 2 hours before surgery  Additional anesthetic preparation and risks discussed: Double lumen ETT  Needs early admission to pre-op area: No                                             Coretta Olivares MD

## 2022-09-14 NOTE — ANESTHESIA PROCEDURE NOTES
Airway       Patient location during procedure: OR       Procedure Start/Stop Times: 9/14/2022 7:41 AM  Staff -        Anesthesiologist:  Coretta Olivares MD       CRNA: Jessica Pérez APRN CRNA       Performed By: anesthesiologist and CRNA  Consent for Airway        Urgency: elective  Indications and Patient Condition       Indications for airway management: vincent-procedural       Induction type:intravenous       Mask difficulty assessment: 1 - vent by mask    Final Airway Details       Final airway type: endotracheal airway       Successful airway: ETT - single and Oral  Endotracheal Airway Details        ETT size (mm): 8.0       Cuffed: yes       Successful intubation technique: direct laryngoscopy       DL Blade Type: MAC 4       Grade View of Cords: 1       Adjucts: stylet       Position: Right       Measured from: lips       Secured at (cm): 21       Bite block used: None    Post intubation assessment        Placement verified by: capnometry, equal breath sounds and chest rise        Number of attempts at approach: 3       Secured with: paper tape       Ease of procedure: easy       Dentition: Intact and Unchanged    Medication(s) Administered   Medication Administration Time: 9/14/2022 7:41 AM    Additional Comments       Easy mask and grade 1 view. Attempted to place 41Fr BENJAMIN, but there was not enough space within pharynx to pass. Second attempt at pass with 39Fr BENJAMIN also unsuccessful d/t lack of space. Single lumen ETT with bronchial blocker was placed at third attempt as alternative.

## 2022-09-14 NOTE — ANESTHESIA PROCEDURE NOTES
Airway       Patient location during procedure: OR       Procedure Start/Stop Times: 9/14/2022 7:41 AM and 9/14/2022 7:50 AM  Staff -        Anesthesiologist:  Coretta Olivares MD       Performed By: anesthesiologist  Consent for Airway        Urgency: elective  Indications and Patient Condition       Indications for airway management: vincent-procedural       Induction type:intravenous       Mask difficulty assessment: 1 - vent by mask    Final Airway Details       Final airway type: endotracheal airway       Successful airway: ETT - single  Endotracheal Airway Details        ETT size (mm): 8.0       Cuffed: yes       Successful intubation technique: video laryngoscopy       VL Blade Size: MAC 4       Grade View of Cords: 1       Measured from: lips    Post intubation assessment        Ease of procedure: easy    Medication(s) Administered   Medication Administration Time: 9/14/2022 7:41 AM    Additional Comments       CRNA attempted BENJAMIN 41 Fr with DL Leach 3. Grade 1 view but unable to advance due to narrow airway. I attempted BENJAMIN 41 Fr with VL and unable to advance due to narrow airway.  Regular ETT 8.0, easy placement. EZ blocker advanced by surgeon, blue balloon on right bronchi. Position confirmed by FOB and auscultation.

## 2022-09-14 NOTE — OP NOTE
Thoracic Surgery Operative Note     Pre-operative Diagnosis:  1. Recurrent spontaneous pneumothorax    Post-operative Diagnosis:  1. Same    Procedure Performed:  1. Right thoracoscopic apical bullectomy  2. Mechanical and talc pleurodesis    Surgeon: Tim Whitt MD    Assistants:   1. Enzo Mak MD  2. Manisha Howell MD    Anesthesia: General with bronchial blocker    Indications: Mario Posada is a 37 year old male who has developed several episodes of spontaneous pneumothorax bilaterally. He is noted to have apical blebs in both lungs, more prominent on right side. He is scheduled to undergo the procedure to reduce risk of recurrnet pneumothorax requiring tube thoracostomy. We plan to perform the right sided procedure today, and he will require similar procedure on the left in the future.     Findings:  1. Apical blebs noted with some adhesions to chest wall    Specimens: Right upper lobe wedge    Drains: 28 Fr right pleural chest tube    Estimated Blood Loss: 10 ml    Procedure Details:    The patient was seen in preoperative holding area, the risks, benefits, and alternatives to the procedure were discussed with the patient, informed consent was obtained. He was taken back to the operating room and placed under general anesthesia with a bronchial blocker to isolate the right lung. The patient was then placed in the right lateral decubitus position with all pressure points well padded, and the right thoracic cage well exposed. The surgical site was prepped with 2% chlorhexidine and draped in standard fashion. A pre-operative time out was held, including the patients name, MRN, procedure to be performed, including laterality with everyone in the OR in agreement.     We began by noting our anatomic landmarks and confirming the right lung was deflated. We made a 12 mm incision in the 7th intercostal space at the mid-axillary line, dissection was performed down and entry into the pleural cavity obtained  immediately superior to the rib. We noted some flimsy adhesions from the right upper lobe to the chest wall.    We then made another 3 cm incision in the 5th intercostal space along the anterior axillary line. Dissection was performed down and the pleura opened immediately superior to the rib along the desired length of incision. An [place] wound protector was then placed. The right upper lobe was gently grasped and all the adhesions taken down using electrocautery until the lobe was free. We then grasped the apex of the lobe and using several fires of the linear stapler (blue load), performed a wedge resection of the apex of the right upper lobe containing blebs. The specimen was removed and sent to pathology for examination. Adequate hemostasis was confirmed.    We then used a bovie scratch pad to perform mechanical pleurodesis over the apical, anterior and lateral aspects of the chest wall. We then used 8g of talc which was sprayed over this area of the chest wall and upper lobe.    A 28 Fr right pleural [place] chest tube was inserted from the initial incisional wound in the 7th intercostal space and guided under visualization towards the apex. The lung was reinflated and confirmed to be expanding well. The wound protector was removed.     Attention was then placed towards closure. The chest was tube was secured using two 0-silk sutures. The other wound was closed in multiple layers, using 0-vicryl for muscle approximation, 2-0 vicryl for deep dermal approximation. The skin was reapproximated using 4-0 Monocryl suture. Steristrips and primapore dressings were applied.      All sponge and instrument counts were confirmed to be correct. Dr. Whitt was present for the entire procedure. The patient was extubated successfully and transferred to pacu in a stable condition.     Disposition: PACU to floor      Enzo Mak MD  Cardiothoracic Surgery Fellow  Pager: [phone]

## 2022-09-14 NOTE — ANESTHESIA CARE TRANSFER NOTE
Patient: Mario Posada    Procedure: Procedure(s):  Right thoracoscopic bullectomy, mechanical and talc pleurodesis       Diagnosis: Secondary spontaneous pneumothorax [J93.12]  Diagnosis Additional Information: No value filed.    Anesthesia Type:   General     Note:    Oropharynx: oropharynx clear of all foreign objects and spontaneously breathing  Level of Consciousness: awake  Oxygen Supplementation: face mask  Level of Supplemental Oxygen (L/min / FiO2): 6 LPM  Independent Airway: airway patency satisfactory and stable  Dentition: dentition unchanged  Vital Signs Stable: post-procedure vital signs reviewed and stable  Report to RN Given: handoff report given  Patient transferred to: PACU    Handoff Report: Identifed the Patient, Identified the Reponsible Provider, Reviewed the pertinent medical history, Discussed the surgical course, Reviewed Intra-OP anesthesia mangement and issues during anesthesia, Set expectations for post-procedure period and Allowed opportunity for questions and acknowledgement of understanding      Vitals:  Vitals Value Taken Time   /79 09/14/22 0950   Temp     Pulse 96 09/14/22 0955   Resp 16 09/14/22 0955   SpO2 100 % 09/14/22 0955   Vitals shown include unvalidated device data.    Electronically Signed By: BELKIS Blackwood CRNA  September 14, 2022  9:56 AM

## 2022-09-14 NOTE — BRIEF OP NOTE
[facility]    Brief Operative Note    Pre-operative diagnosis: Secondary spontaneous pneumothorax [J93.12]  Post-operative diagnosis Same as pre-operative diagnosis    Procedure: Procedure(s):  Right thoracoscopic bullectomy, mechanical and talc pleurodesis    Surgeon: Surgeon(s) and Role:     * Tim Whitt MD - Primary     * Manisha Howell MD - Resident - Assisting     * Enzo Mak MD - Fellow - Assisting    Anesthesia: General     Estimated Blood Loss: Less than 10 ml    Drains:  28 Fr right pleural chest tube    Specimens:   ID Type Source Tests Collected by Time Destination   1 : Right Upper Lobe Wedge Tissue Lung, Upper Lobe, Right SURGICAL PATHOLOGY EXAM Tim Whitt MD 9/14/2022  8:52 AM      Findings:   Apical bleb noted with some adhesions to chest wall.    Complications: None.    Implants: * No implants in log *      Enzo Mak MD  Cardiothoracic Surgery Fellow  Pager: [phone]

## 2022-09-15 ENCOUNTER — APPOINTMENT (OUTPATIENT)
Dept: GENERAL RADIOLOGY | Facility: CLINIC | Age: 37
End: 2022-09-15
Attending: THORACIC SURGERY (CARDIOTHORACIC VASCULAR SURGERY)
Payer: MEDICAID

## 2022-09-15 LAB
ANION GAP SERPL CALCULATED.3IONS-SCNC: 9 MMOL/L (ref 7–15)
BUN SERPL-MCNC: 7.7 MG/DL (ref 6–20)
CALCIUM SERPL-MCNC: 9.2 MG/DL (ref 8.6–10)
CHLORIDE SERPL-SCNC: 101 MMOL/L (ref 98–107)
CREAT SERPL-MCNC: 0.64 MG/DL (ref 0.67–1.17)
DEPRECATED HCO3 PLAS-SCNC: 27 MMOL/L (ref 22–29)
ERYTHROCYTE [DISTWIDTH] IN BLOOD BY AUTOMATED COUNT: 11.8 % (ref 10–15)
GFR SERPL CREATININE-BSD FRML MDRD: >90 ML/MIN/1.73M2
GLUCOSE BLDC GLUCOMTR-MCNC: 112 MG/DL (ref 70–99)
GLUCOSE SERPL-MCNC: 107 MG/DL (ref 70–99)
HCT VFR BLD AUTO: 36.6 % (ref 40–53)
HGB BLD-MCNC: 12.4 G/DL (ref 13.3–17.7)
MCH RBC QN AUTO: 30.6 PG (ref 26.5–33)
MCHC RBC AUTO-ENTMCNC: 33.9 G/DL (ref 31.5–36.5)
MCV RBC AUTO: 90 FL (ref 78–100)
PLATELET # BLD AUTO: 318 10E3/UL (ref 150–450)
POTASSIUM SERPL-SCNC: 3.9 MMOL/L (ref 3.4–5.3)
RBC # BLD AUTO: 4.05 10E6/UL (ref 4.4–5.9)
SODIUM SERPL-SCNC: 137 MMOL/L (ref 136–145)
WBC # BLD AUTO: 15.2 10E3/UL (ref 4–11)

## 2022-09-15 PROCEDURE — 71045 X-RAY EXAM CHEST 1 VIEW: CPT | Mod: 26 | Performed by: RADIOLOGY

## 2022-09-15 PROCEDURE — 250N000011 HC RX IP 250 OP 636

## 2022-09-15 PROCEDURE — 82310 ASSAY OF CALCIUM: CPT | Performed by: SURGERY

## 2022-09-15 PROCEDURE — 85027 COMPLETE CBC AUTOMATED: CPT | Performed by: SURGERY

## 2022-09-15 PROCEDURE — 250N000011 HC RX IP 250 OP 636: Performed by: SURGERY

## 2022-09-15 PROCEDURE — 71045 X-RAY EXAM CHEST 1 VIEW: CPT

## 2022-09-15 PROCEDURE — 250N000013 HC RX MED GY IP 250 OP 250 PS 637: Performed by: SURGERY

## 2022-09-15 PROCEDURE — 120N000002 HC R&B MED SURG/OB UMMC

## 2022-09-15 PROCEDURE — 250N000011 HC RX IP 250 OP 636: Performed by: PHARMACIST

## 2022-09-15 PROCEDURE — 36415 COLL VENOUS BLD VENIPUNCTURE: CPT | Performed by: SURGERY

## 2022-09-15 RX ORDER — HYDROMORPHONE HYDROCHLORIDE 1 MG/ML
0.3 INJECTION, SOLUTION INTRAMUSCULAR; INTRAVENOUS; SUBCUTANEOUS ONCE
Status: COMPLETED | OUTPATIENT
Start: 2022-09-15 | End: 2022-09-15

## 2022-09-15 RX ORDER — KETOROLAC TROMETHAMINE 15 MG/ML
15 INJECTION, SOLUTION INTRAMUSCULAR; INTRAVENOUS EVERY 6 HOURS
Status: DISCONTINUED | OUTPATIENT
Start: 2022-09-15 | End: 2022-09-17 | Stop reason: HOSPADM

## 2022-09-15 RX ORDER — LANOLIN ALCOHOL/MO/W.PET/CERES
3 CREAM (GRAM) TOPICAL
Status: DISCONTINUED | OUTPATIENT
Start: 2022-09-15 | End: 2022-09-17 | Stop reason: HOSPADM

## 2022-09-15 RX ADMIN — ENOXAPARIN SODIUM 40 MG: 40 INJECTION SUBCUTANEOUS at 06:03

## 2022-09-15 RX ADMIN — OXYCODONE HYDROCHLORIDE 10 MG: 10 TABLET ORAL at 19:46

## 2022-09-15 RX ADMIN — SENNOSIDES AND DOCUSATE SODIUM 1 TABLET: 8.6; 5 TABLET ORAL at 19:46

## 2022-09-15 RX ADMIN — OXYCODONE HYDROCHLORIDE 10 MG: 10 TABLET ORAL at 00:41

## 2022-09-15 RX ADMIN — HYDROMORPHONE HYDROCHLORIDE 0.4 MG: 0.2 INJECTION, SOLUTION INTRAMUSCULAR; INTRAVENOUS; SUBCUTANEOUS at 01:56

## 2022-09-15 RX ADMIN — HYDROMORPHONE HYDROCHLORIDE 0.4 MG: 0.2 INJECTION, SOLUTION INTRAMUSCULAR; INTRAVENOUS; SUBCUTANEOUS at 06:06

## 2022-09-15 RX ADMIN — SENNOSIDES AND DOCUSATE SODIUM 1 TABLET: 8.6; 5 TABLET ORAL at 07:55

## 2022-09-15 RX ADMIN — OXYCODONE HYDROCHLORIDE 10 MG: 10 TABLET ORAL at 16:05

## 2022-09-15 RX ADMIN — HYDROMORPHONE HYDROCHLORIDE 0.3 MG: 1 INJECTION, SOLUTION INTRAMUSCULAR; INTRAVENOUS; SUBCUTANEOUS at 03:21

## 2022-09-15 RX ADMIN — OXYCODONE HYDROCHLORIDE 10 MG: 10 TABLET ORAL at 10:18

## 2022-09-15 RX ADMIN — METHOCARBAMOL 750 MG: 750 TABLET ORAL at 11:13

## 2022-09-15 RX ADMIN — METHOCARBAMOL 750 MG: 750 TABLET ORAL at 23:54

## 2022-09-15 RX ADMIN — METHOCARBAMOL 750 MG: 750 TABLET ORAL at 00:41

## 2022-09-15 RX ADMIN — ACETAMINOPHEN 975 MG: 325 TABLET, FILM COATED ORAL at 14:26

## 2022-09-15 RX ADMIN — ACETAMINOPHEN 975 MG: 325 TABLET, FILM COATED ORAL at 07:55

## 2022-09-15 RX ADMIN — POLYETHYLENE GLYCOL 3350 17 G: 17 POWDER, FOR SOLUTION ORAL at 07:55

## 2022-09-15 RX ADMIN — KETOROLAC TROMETHAMINE 15 MG: 15 INJECTION, SOLUTION INTRAMUSCULAR; INTRAVENOUS at 14:26

## 2022-09-15 RX ADMIN — HYDROMORPHONE HYDROCHLORIDE 0.4 MG: 0.2 INJECTION, SOLUTION INTRAMUSCULAR; INTRAVENOUS; SUBCUTANEOUS at 23:53

## 2022-09-15 RX ADMIN — ACETAMINOPHEN 975 MG: 325 TABLET, FILM COATED ORAL at 19:45

## 2022-09-15 RX ADMIN — ACETAMINOPHEN 975 MG: 325 TABLET, FILM COATED ORAL at 02:04

## 2022-09-15 RX ADMIN — KETOROLAC TROMETHAMINE 15 MG: 15 INJECTION, SOLUTION INTRAMUSCULAR; INTRAVENOUS at 08:14

## 2022-09-15 RX ADMIN — OXYCODONE HYDROCHLORIDE 10 MG: 10 TABLET ORAL at 04:46

## 2022-09-15 RX ADMIN — KETOROLAC TROMETHAMINE 15 MG: 15 INJECTION, SOLUTION INTRAMUSCULAR; INTRAVENOUS at 19:47

## 2022-09-15 ASSESSMENT — ACTIVITIES OF DAILY LIVING (ADL)
ADLS_ACUITY_SCORE: 26

## 2022-09-15 NOTE — PROGRESS NOTES
"Blood pressure 107/55, pulse 90, temperature 98.4  F (36.9  C), temperature source Oral, resp. rate 15, height 1.803 m (5' 11\"), weight 57.2 kg (126 lb 1.7 oz), SpO2 98 %.    Activity: SBA w/gait belt  Neuros: AOX4, makes needs known  Cardiac: WDL, denies chest pain  Respiratory: WDL RA 98%, denies SOB  GI/: Voiding spontaneous, +BS, LBM 9/14  Diet: Regular, tolerating   Skin/Incisions/Drains: Old chest tube sites and Rt CT, RT/LT PIV  Lines: Rt/Lt PIV SL  Pain: 3-9/10 IV Dilaudid, Oxy  Plan: Continue with POC  "

## 2022-09-15 NOTE — PROGRESS NOTES
Cross-cover note: 3:04 AM 9/15/2022     September 15, 2022    General Surgery Cross Cover Note    Paged by nurse regarding pain.    Per patient, he has 8/10 pain at site of chest tube, radiating up right flank to shoulder. He states that it hurts when he takes a breath, especially when using his IS. No SOB, CP, or dizziness.     B/P: 95/51, T: 98.1, P: 88, R: 16. O2 sats 100% ORA    PE:  NAD  Breathing non-labored, chest tube in place with air leak  Extr. Warm to touch  Incisions clean, dry, intact with dressing in place    Plan:  - Continue using IS as able  - 1 time extra dose of hydromorphone now  - Continue to monitor pain    Mercy Connolly MD, PharmD  General Surgery, PGY1  Pager [phone]  Please page primary team with questions

## 2022-09-15 NOTE — PROGRESS NOTES
7B Mario Posada 37-1  Pt. is having uncontrolled pain. Gave PRN oxycodone 10mg, PRN Dilaudid, scheduled Tylenol and pain has remained 8/10 on right chest, worsens with deep breaths.   Irene CAMERON RN   #[phone]

## 2022-09-15 NOTE — PROGRESS NOTES
"THORACIC SURGERY PROGRESS NOTE     S: Having pain this morning. Pain with deep breaths. Working on IS, is able to take over 2L.      O:  BP 95/51 (BP Location: Left arm)   Pulse 88   Temp 98.1  F (36.7  C) (Oral)   Resp 16   Ht 1.803 m (5' 11\")   Wt 57.2 kg (126 lb 1.7 oz)   SpO2 100%   BMI 17.59 kg/m         Gen: Resting in bed, no acute distress  Resp: non labored breathing on room air, chest tube in place right chest, serosanguinous output, small air leak  CV: RRR  Abd: soft, nondistended  Ext: well perfused, no gross deformity.    CXR reviewed     A/P: Mario Posada is a 38 yo M with history of spontaneous pneumothorax, no status post RUL bullectomy with mechanical and talc pleurodesis     Pain: scheduled tylenol, add scheduled toradol, prn robaxin, prn oxycodone, dilaudid  Regular diet  Ambulate as able. *Chest tube to remain to suction while up out of bed  Chest tube to suction x48h from surgery  Lovenox ppx     Seen with Fellow who discussed with staff.    Manisha Howell DO  General Surgery PGY3      "

## 2022-09-16 ENCOUNTER — APPOINTMENT (OUTPATIENT)
Dept: GENERAL RADIOLOGY | Facility: CLINIC | Age: 37
End: 2022-09-16
Attending: THORACIC SURGERY (CARDIOTHORACIC VASCULAR SURGERY)
Payer: MEDICAID

## 2022-09-16 PROCEDURE — 71045 X-RAY EXAM CHEST 1 VIEW: CPT | Mod: 26 | Performed by: RADIOLOGY

## 2022-09-16 PROCEDURE — 250N000013 HC RX MED GY IP 250 OP 250 PS 637: Performed by: PHARMACIST

## 2022-09-16 PROCEDURE — 250N000011 HC RX IP 250 OP 636

## 2022-09-16 PROCEDURE — 250N000011 HC RX IP 250 OP 636: Performed by: SURGERY

## 2022-09-16 PROCEDURE — 71045 X-RAY EXAM CHEST 1 VIEW: CPT

## 2022-09-16 PROCEDURE — 250N000013 HC RX MED GY IP 250 OP 250 PS 637: Performed by: SURGERY

## 2022-09-16 PROCEDURE — 71045 X-RAY EXAM CHEST 1 VIEW: CPT | Mod: 77

## 2022-09-16 PROCEDURE — 120N000002 HC R&B MED SURG/OB UMMC

## 2022-09-16 RX ORDER — OXYCODONE HYDROCHLORIDE 5 MG/1
5-10 TABLET ORAL EVERY 4 HOURS PRN
Qty: 50 TABLET | Refills: 0 | Status: SHIPPED | OUTPATIENT
Start: 2022-09-16

## 2022-09-16 RX ORDER — MULTIVIT WITH MINERALS/LUTEIN
250 TABLET ORAL DAILY
COMMUNITY

## 2022-09-16 RX ORDER — PLANT STANOL ESTER 450 MG
TABLET ORAL
COMMUNITY

## 2022-09-16 RX ORDER — VITAMIN B COMPLEX
TABLET ORAL DAILY
COMMUNITY

## 2022-09-16 RX ORDER — ACETAMINOPHEN 325 MG/1
975 TABLET ORAL EVERY 6 HOURS
COMMUNITY
Start: 2022-09-16

## 2022-09-16 RX ORDER — METHOCARBAMOL 750 MG/1
750 TABLET, FILM COATED ORAL EVERY 6 HOURS PRN
Qty: 30 TABLET | Refills: 0 | Status: SHIPPED | OUTPATIENT
Start: 2022-09-16

## 2022-09-16 RX ORDER — AMOXICILLIN 250 MG
1 CAPSULE ORAL 2 TIMES DAILY
Qty: 50 TABLET | Refills: 0 | Status: SHIPPED | OUTPATIENT
Start: 2022-09-16

## 2022-09-16 RX ADMIN — OXYCODONE HYDROCHLORIDE 10 MG: 10 TABLET ORAL at 14:56

## 2022-09-16 RX ADMIN — ACETAMINOPHEN 975 MG: 325 TABLET, FILM COATED ORAL at 01:43

## 2022-09-16 RX ADMIN — Medication 3 MG: at 00:05

## 2022-09-16 RX ADMIN — ENOXAPARIN SODIUM 40 MG: 40 INJECTION SUBCUTANEOUS at 06:02

## 2022-09-16 RX ADMIN — OXYCODONE HYDROCHLORIDE 10 MG: 10 TABLET ORAL at 10:50

## 2022-09-16 RX ADMIN — KETOROLAC TROMETHAMINE 15 MG: 15 INJECTION, SOLUTION INTRAMUSCULAR; INTRAVENOUS at 14:04

## 2022-09-16 RX ADMIN — ACETAMINOPHEN 975 MG: 325 TABLET, FILM COATED ORAL at 07:31

## 2022-09-16 RX ADMIN — SENNOSIDES AND DOCUSATE SODIUM 1 TABLET: 8.6; 5 TABLET ORAL at 07:31

## 2022-09-16 RX ADMIN — OXYCODONE HYDROCHLORIDE 10 MG: 10 TABLET ORAL at 19:55

## 2022-09-16 RX ADMIN — KETOROLAC TROMETHAMINE 15 MG: 15 INJECTION, SOLUTION INTRAMUSCULAR; INTRAVENOUS at 01:42

## 2022-09-16 RX ADMIN — SENNOSIDES AND DOCUSATE SODIUM 1 TABLET: 8.6; 5 TABLET ORAL at 19:55

## 2022-09-16 RX ADMIN — KETOROLAC TROMETHAMINE 15 MG: 15 INJECTION, SOLUTION INTRAMUSCULAR; INTRAVENOUS at 19:54

## 2022-09-16 RX ADMIN — POLYETHYLENE GLYCOL 3350 17 G: 17 POWDER, FOR SOLUTION ORAL at 07:31

## 2022-09-16 RX ADMIN — OXYCODONE HYDROCHLORIDE 10 MG: 10 TABLET ORAL at 06:02

## 2022-09-16 RX ADMIN — ACETAMINOPHEN 975 MG: 325 TABLET, FILM COATED ORAL at 19:55

## 2022-09-16 RX ADMIN — HYDROMORPHONE HYDROCHLORIDE 0.4 MG: 0.2 INJECTION, SOLUTION INTRAMUSCULAR; INTRAVENOUS; SUBCUTANEOUS at 22:57

## 2022-09-16 RX ADMIN — KETOROLAC TROMETHAMINE 15 MG: 15 INJECTION, SOLUTION INTRAMUSCULAR; INTRAVENOUS at 07:31

## 2022-09-16 RX ADMIN — ACETAMINOPHEN 975 MG: 325 TABLET, FILM COATED ORAL at 14:04

## 2022-09-16 RX ADMIN — OXYCODONE HYDROCHLORIDE 10 MG: 10 TABLET ORAL at 00:58

## 2022-09-16 ASSESSMENT — ACTIVITIES OF DAILY LIVING (ADL)
ADLS_ACUITY_SCORE: 26
ADLS_ACUITY_SCORE: 22
ADLS_ACUITY_SCORE: 26
ADLS_ACUITY_SCORE: 26
ADLS_ACUITY_SCORE: 22
ADLS_ACUITY_SCORE: 26
ADLS_ACUITY_SCORE: 22

## 2022-09-16 NOTE — PHARMACY-ADMISSION MEDICATION HISTORY
Admission Medication History Completed by Pharmacy    See [facility] Admission Navigator for allergy information, preferred outpatient pharmacy, prior to admission medications and immunization status.     Medication History Sources:     Patient     Changes made to PTA medication list (reason):    Added: vitamin D, vitamin C, OTC potassium supplement, vitamin B -pt unsure of doses    Deleted: None    Changed: None    Additional Information:    None    Prior to Admission medications    Medication Sig Last Dose Taking? Auth Provider Long Term End Date   acetaminophen (TYLENOL) 325 MG tablet Take 3 tablets (975 mg) by mouth every 6 hours  Yes Raymond Armstrong PA-C     acetaminophen (TYLENOL) 325 MG tablet Take 2 tablets (650 mg) by mouth every 6 hours as needed for mild pain or fever 9/14/2022 at 0300 Yes Katharina Chappell MD     hydrOXYzine (ATARAX) 25 MG tablet Take 1 tablet (25 mg) by mouth every 6 hours as needed for other or anxiety (adjuvant pain) 9/13/2022 at 2100 Yes Katharina Chappell MD     methocarbamol (ROBAXIN) 750 MG tablet Take 1 tablet (750 mg) by mouth every 6 hours as needed for muscle spasms  Yes Raymond Armstrong PA-C     oxyCODONE (ROXICODONE) 5 MG tablet Take 1-2 tablets (5-10 mg) by mouth every 4 hours as needed for moderate to severe pain  Yes Raymond Armstrong PA-C     oxyCODONE-acetaminophen (PERCOCET) 5-325 MG tablet Take 1 tablet by mouth every 6 hours as needed for severe pain More than a month at Unknown time Yes Nigel Benitez MD     potassium gluconate 2.5 MEQ tablet   Yes Unknown, Entered By History     senna-docusate (SENOKOT-S/PERICOLACE) 8.6-50 MG tablet Take 1 tablet by mouth 2 times daily  Yes Raymond Armstrong PA-C     vitamin B-Complex Take 1 tablet by mouth daily  Yes Unknown, Entered By History     vitamin C (ASCORBIC ACID) 250 MG tablet Take 250 mg by mouth daily  Yes Unknown, Entered By History     Vitamin D3 (CHOLECALCIFEROL) 25 mcg (1000 units) tablet Take by mouth  daily  Yes Unknown, Entered By History     EPINEPHrine (PRIMATENE MIST IN) Inhale 1 puff into the lungs once as needed (wheezing) 9/10/2022  Reported, Patient         Date completed: 09/16/22    Medication history completed by: Amairani Kinsey, PharmD, BCPS

## 2022-09-16 NOTE — DISCHARGE SUMMARY
NAME: Mario Posada   MRN: [id number]   : 1985     DATE OF ADMISSION: 2022     Pre-operative Diagnosis:  1. Recurrent spontaneous pneumothorax     Post-operative Diagnosis:  1. Same     Procedure Performed:  1. Right thoracoscopic apical bullectomy  2. Mechanical and talc pleurodesis    CULTURE RESULTS: None     INTRAOPERATIVE COMPLICATIONS: None     POSTOPERATIVE MEDICAL ISSUES: None     DRAINS/TUBES PRESENT AT DISCHARGE: None    DATE OF DISCHARGE:  22    HOSPITAL COURSE: Mario Posada is a 37 year old male who on 2022 underwent the above-named procedures.  He tolerated the operation well and postoperatively was transferred to the general post-surgical unit.  The remainder of his course was essentially uncomplicated. He remained with chest tubes to suction for 48 hours then progressively went to water seal and removed the chest tube without issue.  Prior to discharge, his pain was controlled well, he was able to perform ADLs and ambulate independently without difficulty, and had full return of bowel and bladder function.  On 22, he was discharged to home in stable condition with no tubes or drains in place.    DISCHARGE EXAM:   A&O, NAD  Resp non-labored on room air  Distal extremities warm, well perfused  Incisions healing well     DISCHARGE INSTRUCTIONS:  Discharge Procedure Orders   X-ray Chest 2 vws*   Standing Status: Future Standing Exp. Date: 12/15/22     Order Specific Question Answer Comments   Priority Routine      Reason for your hospital stay   Order Comments: surgery     Activity   Order Comments: As in discharge instruction section     Order Specific Question Answer Comments   Is discharge order? Yes      Adult [facility] Follow-up and recommended labs and tests   Order Comments: 1.) Follow up with primary care physician in 1-2 weeks.  2.) Follow up with a thoracic surgery Clinical Nurse Specialist in Thoracic Surgery clinic in 1 month, prior to which a CXR should be  performed.     Appointments on [place] and/or [facility] (with [facility] or [facility] provider or service). Call [phone] if you haven't heard regarding these appointments within 7 days of discharge.     Discharge Instructions   Order Comments: THORACIC SURGERY DISCHARGE INSTRUCTIONS    DIET: Regular diet - as prior to admission     If your plans upon discharge include prolonged periods of sitting (i.e a lengthy car or plane ride), it is highly beneficial to get up and walk at least once per hour to help prevent swelling and blood clots.     You may remove chest tube dressing 48 hours after tube removal and bandage the site at your own discretion thereafter.  Small amounts of leakage are normal for 2-3 days after removal.  Feel free to call with questions.    You may get incision wet 2 days after operation. Do not submerge, soak, or scrub incision or swim until seen in follow-up.    Take incentive spirometer home for continued frequent use    Activity as tolerated, no strenous activity until seen in follow-up, no lifting greater than 20 pounds for the next 4 weeks.    Stay hydrated. Take over the counter fiber (metamucil or benefiber) and stool softeners (Miralax, docusate or senna) if becoming constipated.     Call for fever greater than 101.5, chills, increased size of incision, red skin around incision, vision changes, muscle strength changes, sensation changes, shortness of breath, or other concerns.    No driving while taking narcotic pain medication.    Transition to ibuprofen and Tylenol/acetaminophen for pain control scheduled at alternating times. If you have known ulcer problems, or kidney trouble (elevated creatinine) do not take the ibuprofen.    In emergencies, call 911    For other Questions or Concerns;   A.) During weekday working hours (Monday through Friday 8am to 4:30pm)   call [phone] and ask to speak to a thoracic surgery nurse (RN or LPN).     B.) At nights (after 4:30pm), on  "weekends, or if urgent call [phone] and   tell the  \"I would like to page job code [phone], the thoracic surgery   fellow on call, please.\"     Diet   Order Comments: As in discharge instruction section     Order Specific Question Answer Comments   Is discharge order? Yes        DISCHARGE MEDICATIONS:   Discharge Medication List as of 9/17/2022  1:52 PM      START taking these medications    Details   !! acetaminophen (TYLENOL) 325 MG tablet Take 3 tablets (975 mg) by mouth every 6 hours, OTC      methocarbamol (ROBAXIN) 750 MG tablet Take 1 tablet (750 mg) by mouth every 6 hours as needed for muscle spasms, Disp-30 tablet, R-0, E-Prescribe      oxyCODONE (ROXICODONE) 5 MG tablet Take 1-2 tablets (5-10 mg) by mouth every 4 hours as needed for moderate to severe pain, Disp-50 tablet, R-0, E-PrescribeWean narcotic use as tolerated starting at time of discharge      senna-docusate (SENOKOT-S/PERICOLACE) 8.6-50 MG tablet Take 1 tablet by mouth 2 times daily, Disp-50 tablet, R-0, E-Prescribe       !! - Potential duplicate medications found. Please discuss with provider.      CONTINUE these medications which have NOT CHANGED    Details   !! acetaminophen (TYLENOL) 325 MG tablet Take 2 tablets (650 mg) by mouth every 6 hours as needed for mild pain or fever, No Print Out      hydrOXYzine (ATARAX) 25 MG tablet Take 1 tablet (25 mg) by mouth every 6 hours as needed for other or anxiety (adjuvant pain), Disp-20 tablet, R-0, E-Prescribe      oxyCODONE-acetaminophen (PERCOCET) 5-325 MG tablet Take 1 tablet by mouth every 6 hours as needed for severe pain, Disp-6 tablet, R-0, InstyMeds      potassium gluconate 2.5 MEQ tablet Historical      vitamin B-Complex Take 1 tablet by mouth daily, Historical      vitamin C (ASCORBIC ACID) 250 MG tablet Take 250 mg by mouth daily, Historical      Vitamin D3 (CHOLECALCIFEROL) 25 mcg (1000 units) tablet Take by mouth daily, Historical      EPINEPHrine (PRIMATENE MIST IN) Inhale 1 " puff into the lungs once as needed (wheezing), Historical       !! - Potential duplicate medications found. Please discuss with provider.

## 2022-09-16 NOTE — PROGRESS NOTES
7B 37-1 Mario Posada     Pt. is requesting a sleep aid. Hard time falling asleep.  Irene CAMERON RN #[id number]

## 2022-09-16 NOTE — PROGRESS NOTES
"THORACIC SURGERY PROGRESS NOTE     S: Patient reports continued significant pain, but better with addition of toradol. Patient used oxy 10x4 and dilaudid 0.5 x1 in past 24 hours. IS >2L     O:  /67 (BP Location: Left arm)   Pulse 65   Temp 97.8  F (36.6  C) (Oral)   Resp 16   Ht 1.803 m (5' 11\")   Wt 57.2 kg (126 lb 1.7 oz)   SpO2 98%   BMI 17.59 kg/m         Gen: Resting in bed, no acute distress  Resp: non labored breathing on room air, chest tube in place right chest, serosanguinous output, small air leak, less than yesterday  CV: RRR  Abd: soft, nondistended  Ext: well perfused, no gross deformity.    CXR reviewed     A/P: Mario Posada is a 38 yo M with history of spontaneous pneumothorax, now status post RUL bullectomy with mechanical and talc pleurodesis     Pain: scheduled tylenol, add scheduled toradol, prn robaxin, prn oxycodone, prn dilaudid  Regular diet  Ambulate as able. *Chest tube to remain to suction while up out of bed  Chest tube to remain to suction today due to air leak  Lovenox ppx     Seen with Fellow who discussed with staff.    Major Alfaro MD  Intern, General Surgery  Thoracic Surgery Service      "

## 2022-09-17 ENCOUNTER — APPOINTMENT (OUTPATIENT)
Dept: GENERAL RADIOLOGY | Facility: CLINIC | Age: 37
End: 2022-09-17
Attending: THORACIC SURGERY (CARDIOTHORACIC VASCULAR SURGERY)
Payer: MEDICAID

## 2022-09-17 VITALS
SYSTOLIC BLOOD PRESSURE: 118 MMHG | DIASTOLIC BLOOD PRESSURE: 79 MMHG | WEIGHT: 126.1 LBS | OXYGEN SATURATION: 98 % | TEMPERATURE: 97.7 F | HEIGHT: 71 IN | HEART RATE: 60 BPM | BODY MASS INDEX: 17.65 KG/M2 | RESPIRATION RATE: 15 BRPM

## 2022-09-17 PROCEDURE — 71045 X-RAY EXAM CHEST 1 VIEW: CPT | Mod: 77

## 2022-09-17 PROCEDURE — 250N000011 HC RX IP 250 OP 636

## 2022-09-17 PROCEDURE — 71045 X-RAY EXAM CHEST 1 VIEW: CPT

## 2022-09-17 PROCEDURE — 71045 X-RAY EXAM CHEST 1 VIEW: CPT | Mod: 26 | Performed by: RADIOLOGY

## 2022-09-17 PROCEDURE — 250N000013 HC RX MED GY IP 250 OP 250 PS 637: Performed by: SURGERY

## 2022-09-17 RX ADMIN — OXYCODONE HYDROCHLORIDE 10 MG: 10 TABLET ORAL at 04:58

## 2022-09-17 RX ADMIN — ACETAMINOPHEN 975 MG: 325 TABLET, FILM COATED ORAL at 08:53

## 2022-09-17 RX ADMIN — POLYETHYLENE GLYCOL 3350 17 G: 17 POWDER, FOR SOLUTION ORAL at 08:53

## 2022-09-17 RX ADMIN — METHOCARBAMOL 750 MG: 750 TABLET ORAL at 02:49

## 2022-09-17 RX ADMIN — SENNOSIDES AND DOCUSATE SODIUM 1 TABLET: 8.6; 5 TABLET ORAL at 08:53

## 2022-09-17 RX ADMIN — KETOROLAC TROMETHAMINE 15 MG: 15 INJECTION, SOLUTION INTRAMUSCULAR; INTRAVENOUS at 08:53

## 2022-09-17 RX ADMIN — KETOROLAC TROMETHAMINE 15 MG: 15 INJECTION, SOLUTION INTRAMUSCULAR; INTRAVENOUS at 02:45

## 2022-09-17 RX ADMIN — OXYCODONE HYDROCHLORIDE 10 MG: 10 TABLET ORAL at 00:52

## 2022-09-17 RX ADMIN — ACETAMINOPHEN 975 MG: 325 TABLET, FILM COATED ORAL at 02:45

## 2022-09-17 RX ADMIN — OXYCODONE HYDROCHLORIDE 10 MG: 10 TABLET ORAL at 09:58

## 2022-09-17 ASSESSMENT — ACTIVITIES OF DAILY LIVING (ADL)
ADLS_ACUITY_SCORE: 22

## 2022-09-17 NOTE — PROGRESS NOTES
"/75 (BP Location: Left arm)   Pulse 62   Temp 98.2  F (36.8  C) (Oral)   Resp 16   Ht 1.803 m (5' 11\")   Wt 57.2 kg (126 lb 1.7 oz)   SpO2 98%   BMI 17.59 kg/m      Time: 4641-3368.  Reason for Admission: POD#3 R thoracoscopic apical bullectomy & mechanical + talc pleurodesis.   Activity: SBA.   Neuro: A&O x4. Calls appropriately.  Cardiac: WDL. Denies chest pain.   Respiratory: WDL on RA. LS clear, diminished RUL. Denies SOB.   GI/: Voiding spontaneously, AUOP. No BM this shift. LBM 9/16.  Diet: Regular.    Skin/Incisions/Drains: R CT to waterseal.   Lines: L PIV SL.   Labs: Reviewed.   Pain/Nausea: C/o R CT site pain, managed w/ PRN oxycodone, PRN robaxin, scheduled Tylenol & toradol. Denies nausea.   New changes this shift: X  Plan: Pain management. Continue POC.       "

## 2022-09-17 NOTE — PLAN OF CARE
"/52 (BP Location: Left arm)   Pulse 67   Temp 98.1  F (36.7  C) (Oral)   Resp 18   Ht 1.803 m (5' 11\")   Wt 57.2 kg (126 lb 1.7 oz)   SpO2 96%   BMI 17.59 kg/m        Shift: 5519-6076  Status: Spontaneous pneumothorax.   Neuro: A&O x 4. Denies numbness and tingling.   Resp: Chest tube to suction. Pain with deep breathes. Poor tolerance of IS.   Cardiac: WDL.   GI/: Voiding spontaneously. Regular diet.   Skin: Warm and dry.   Activity: SBA.   Incision & Drainage: PIV SL. Right chest tube w/ air leak-providers aware.   Pain: 6/10 on right chest and back. Gave PRN oxycodone, dilaudid, methocarbamol and scheduled tylenol and Toradol.   Labs: Reviewed.   Changes: No acute changes.   Plan: Continue with POC.         Goal Outcome Evaluation: No change.     Plan of Care Reviewed With: patient     Overall Patient Progress: no change           "
"BP (!) 70/56 (BP Location: Left arm)   Pulse 70   Temp 97.9  F (36.6  C) (Oral)   Resp 16   Ht 1.803 m (5' 11\")   Wt 57.2 kg (126 lb 1.7 oz)   SpO2 98%   BMI 17.59 kg/m       Reason for Admission: Spontaneous pneumothorax    Status: Progressing    RN assumed cares at 0700    Pain: Pain managed w/Tylenol x 2 and IV Toradol x 2  Neuro: A/Ox4, denies n/t, calls appropriately  Cardiac: WDL - denies chest pain  Respiratory: RA, sating upper 90s, denies SOB  GI/: No BM on shift; voiding w/bedside urinal  IV/Drains: L PIV - SL  Activity: SBA - pt ambulated halls on shift  Skin: R CT - CDI  Labs: Reviewed    Changes on Shift Pt CT to suction at all times    Plan of Care: Proceed w/POC  "
"BP 95/51 (BP Location: Left arm)   Pulse 88   Temp 98.1  F (36.7  C) (Oral)   Resp 16   Ht 1.803 m (5' 11\")   Wt 57.2 kg (126 lb 1.7 oz)   SpO2 100%   BMI 17.59 kg/m        Shift: 0268-2455  Status: Bullectomy and talc pleurodesis for recurrent spontaneous pneumothorax-9/14/2022.   Neuro: A&O x 4. Denies numbness and tingling.   Resp: RA. Pain with deep breathes.   Cardiac: WDL. Soft BP.   GI/: Regular diet.   Skin: Warm and dry.   Activity: SBA.   Incision & Drainage: (2) PIV SL. Chest tube -20 cm H2O.   Pain: Pain on right chest 8/10. Gave PRN oxycodone x 2. PRN dilaudid x 2. One time 0.3 mg Dilaudid.    Labs: Hemoglobin 12.4. WBC: 15.2.   Changes: No acute changes.   Plan: Continue with POC.             Goal Outcome Evaluation: Ongoing progressing.     "
Goal Outcome Evaluation:  Pt. admitted from PACU at 12:45 PM ,  Pt. had right thoracoscopic bullectomy,mechanical and talk pleurodesis.  Pt has CT to -20 cm Hg suctioning with air leak,dressing dry and clean.LS diminished,BS+,right back/chest pain 8/10 controled with IV dilaudid 0.4 mg given one time tylenol and robaxin ,pain decreased to 3/10.Will continue to control.                      
Patient discharged home following hospital stay for: spontaneous pneumothorax      Vitals stable.  Oxygen status:  Patient tolerating diet:    Belongings sent with patient. IV site discontinued. Discharge meds to discharge pharmacy. AVS and education gone over with patient, signed copy in patient chart. Pt. To follow up as outpatient with PCP. Pt verbalized understanding of all education and information.  
Vitals:    09/15/22 0900 09/15/22 1100 09/15/22 1506 09/15/22 1600   BP: 104/65 99/49 (!) 70/56 92/56   BP Location: Left arm Left arm Left arm Left arm   Patient Position:  Supine     Cuff Size: Adult Regular Adult Regular     Pulse: 79 67 70 74   Resp: 16 17 16 16   Temp: 97.4  F (36.3  C) 98.1  F (36.7  C) 97.9  F (36.6  C) 97.7  F (36.5  C)   TempSrc: Oral Oral Oral Oral   SpO2:   98% 98%   Weight:       Height:          Afebrile soft BP, sating 98% on room air. None labor breathing, using IS  Chest tube to -20 suction, air leak detected, 250 ml bloody drainage In the canister,   Complain of right upper back and chest pain, Oxy, Toradol  with better pain control,   Tolerating regular intake, voiding adequate, no BM this shift .  Resting continue with plan of care.  
Vitals:    09/15/22 1752 09/15/22 2210 09/16/22 0736 09/16/22 1135   BP: 113/72 111/52 113/67 115/66   BP Location: Left arm Left arm Left arm Left arm   Patient Position:       Cuff Size:       Pulse:  67 65 68   Resp:  18 16 16   Temp:  98.1  F (36.7  C) 97.8  F (36.6  C) 98.2  F (36.8  C)   TempSrc:  Oral Oral Oral   SpO2:  96% 98% 98%   Weight:       Height:        Afebrile vitals stable, sating 98% on room air, none labor breathing,   Using IS, pt stated painful with deep breathing using IS, chest tube to -20 suction,   Pt had an X-ray, suction disconnected, placed to water seal by MD, small air leak, better than yesterday,  Belly is soft none distended, passing gas, voiding adequate,   Oxycodone, 10 mg and Toradol casas to help, up independently ambulated,  Tolerating regular diet. Plan for another x-ray  No acute change, resting in bed, continue with plan of care.     
Vitamin/Nutrition Education/[place]

## 2022-09-18 LAB — RADIOLOGIST FLAGS: ABNORMAL

## 2022-09-19 ENCOUNTER — PATIENT OUTREACH (OUTPATIENT)
Dept: CARE COORDINATION | Facility: CLINIC | Age: 37
End: 2022-09-19

## 2022-09-19 NOTE — PROGRESS NOTES
"Clinic Care Coordination Contact  [facility]: Post-Discharge Note  SITUATION                                                      Admission:    Admission Date: 09/14/22   Reason for Admission: Recurrent spontaneous pneumothorax  Discharge:   Discharge Date: 09/17/22  Discharge Diagnosis: Recurrent spontaneous pneumothorax    BACKGROUND                                                      Per hospital discharge summary and inpatient provider notes:    Mario Posada is a 37 year old male who on 9/14/2022 underwent the above-named procedures.  He tolerated the operation well and postoperatively was transferred to the general post-surgical unit.  The remainder of his course was essentially uncomplicated. He remained with chest tubes to suction for 48 hours then progressively went to water seal and removed the chest tube without issue.  Prior to discharge, his pain was controlled well, he was able to perform ADLs and ambulate independently without difficulty, and had full return of bowel and bladder function.  On 9/17/22, he was discharged to home in stable condition with no tubes or drains in place.    ASSESSMENT      Discharge Assessment  How are you doing now that you are home?: \"Diong good\"  How are your symptoms? (Red Flag symptoms escalate to triage hotline per guidelines): Improved  Do you feel your condition is stable enough to be safe at home until your provider visit?: Yes  Does the patient have their discharge instructions? : Yes  Does the patient have questions regarding their discharge instructions? : No  Were you started on any new medications or were there changes to any of your previous medications? : Yes  Does the patient have all of their medications?: Yes  Do you have questions regarding any of your medications? : No  Do you have all of your needed medical supplies or equipment (DME)?  (i.e. oxygen tank, CPAP, cane, etc.): Yes  Discharge follow-up appointment scheduled within 14 calendar days? " : Yes  Discharge Follow Up Appointment Scheduled with?: Primary Care Provider    Post-op ([facility] Only)  If the patient had a surgery or procedure, do they have any questions for a nurse?: No    PLAN                                                      Outpatient Plan:    1.) Follow up with primary care physician in 1-2 weeks.  2.) Follow up with a thoracic surgery Clinical Nurse Specialist in Thoracic Surgery clinic in 1 month, prior to which a CXR should be performed.      Appointments on [place] and/or [facility] (with [facility] or [facility] provider or service). Call [phone] if you haven't heard regarding these appointments within 7 days of discharge.    Future Appointments   Date Time Provider Department Center   10/12/2022  1:15 PM Nirmala Mckeon APRN [facility]         For any urgent concerns, please contact our 24 hour nurse triage line: [phone] ([phone])         JU Landers  [phone]  [facility]

## 2022-09-20 LAB
PATH REPORT.COMMENTS IMP SPEC: NORMAL
PATH REPORT.COMMENTS IMP SPEC: NORMAL
PATH REPORT.FINAL DX SPEC: NORMAL
PATH REPORT.GROSS SPEC: NORMAL
PATH REPORT.MICROSCOPIC SPEC OTHER STN: NORMAL
PATH REPORT.RELEVANT HX SPEC: NORMAL
PHOTO IMAGE: NORMAL

## 2022-09-20 PROCEDURE — 88307 TISSUE EXAM BY PATHOLOGIST: CPT | Mod: 26 | Performed by: PATHOLOGY

## 2022-10-03 ENCOUNTER — HOSPITAL ENCOUNTER (OUTPATIENT)
Dept: GENERAL RADIOLOGY | Facility: CLINIC | Age: 37
Discharge: HOME | End: 2022-10-03
Attending: PHYSICIAN ASSISTANT | Admitting: PHYSICIAN ASSISTANT
Payer: MEDICAID

## 2022-10-03 DIAGNOSIS — J93.83 SPONTANEOUS PNEUMOTHORAX: ICD-10-CM

## 2022-10-03 PROCEDURE — 71046 X-RAY EXAM CHEST 2 VIEWS: CPT

## 2022-10-12 ENCOUNTER — VIRTUAL VISIT (OUTPATIENT)
Dept: SURGERY | Facility: CLINIC | Age: 37
End: 2022-10-12
Attending: CLINICAL NURSE SPECIALIST
Payer: MEDICAID

## 2022-10-12 DIAGNOSIS — J93.12 SECONDARY SPONTANEOUS PNEUMOTHORAX: Primary | ICD-10-CM

## 2022-10-12 PROCEDURE — 99024 POSTOP FOLLOW-UP VISIT: CPT | Mod: 93 | Performed by: CLINICAL NURSE SPECIALIST

## 2022-10-12 NOTE — LETTER
10/12/2022         RE: Mario Posada  [address] [facility] [address]        Dear Colleague,    Thank you for referring your patient, Mario Posada, to the [facility]. Please see a copy of my visit note below.    Mario is a 37 year old who is being evaluated via a billable telephone visit.      Patient stated he is in the state of [place] for the visit today.    What phone number would you like to be contacted at? [phone]  How would you like to obtain your AVS? Jaden Allen, Virtual Visit Facilitator    THORACIC SURGERY FOLLOW UP VISIT      I had a telephone visit with MrGarrett Posada in follow-up today. The clinical summary follows:     PREOP DIAGNOSIS   Recurrent spontaneous pneumothorax  PROCEDURE   Right thoracoscopic apical bullectomy, mechanical and talc pleurodesis  DATE OF PROCEDURE  09/14/2022    COMPLICATIONS  None    INTERVAL STUDIES  CXR 10/03: decreased right apical pneumothorax    ETOH 2-4 drinks/week  TOB former smoker, 4.5 pack years, quit 01/01/2011    SUBJECTIVE  Mario is doing well. He does not have shortness of breath and is able to be active without any difficulty breathing. He does notice that if he tries to take a deep breath it hurts at first but after a few deep breaths it is better. He has an occasionally dry cough. He denies pain. His incisions are healed up nicely.    From a personal perspective, he works at [employer].    IMPRESSION (J93.12) Secondary spontaneous pneumothorax  (primary encounter diagnosis)  Comment: one month post-operative follow up  Plan: XR Chest 2 Views            37 year-old male with a recurrent right spontaneous pneumothorax status post apical bullectomy and mechanical/talc pleurodesis.    I will have him get another CXR as the one on 10/03 was a little too soon following his procedure (it should be closer to one month from his procedure).     As for any additional testing or procedures, we will base things  on his symptoms. If he develops symptoms suggestive of another pneumothorax he will contact our office otherwise, he does not need to have scheduled follow up with us.    PLAN  I spent 15 min on the date of the encounter in chart review, patient visit, review of tests, documentation and/or discussion with other providers about the issues documented above. I reviewed the plan as follows:  CXR within a week otherwise follow up with thoracic surgery as needed.  Necessary Tests & Appointments: CXR  All questions were answered and the patient and present family were in agreement with the plan.  I appreciate the opportunity to participate in the care of your patient and will keep you updated.  Phone call duration: 6 minutes          Again, thank you for allowing me to participate in the care of your patient.      Sincerely,    BELKIS Henriquez CNS

## 2022-10-12 NOTE — NURSING NOTE
Mario Posada 37 year old male presents for post op follow up. S/p Right thoracoscopic bullectomy, mechanical and talc pleurodesison 9/14/22 for Secondary spontaneous pneumothorax. Patient states doing well for the most part. Pt mentioned minor discomfort with breathing in more so in the mornings.  Aimee Allen, Virtual Facilitator

## 2022-10-12 NOTE — PROGRESS NOTES
Mario is a 37 year old who is being evaluated via a billable telephone visit.      Patient stated he is in the state of [place] for the visit today.    What phone number would you like to be contacted at? [phone]  How would you like to obtain your AVS? Jaden Allen, Virtual Visit Facilitator    THORACIC SURGERY FOLLOW UP VISIT      I had a telephone visit with Mr. Mario Posada in follow-up today. The clinical summary follows:     PREOP DIAGNOSIS   Recurrent spontaneous pneumothorax  PROCEDURE   Right thoracoscopic apical bullectomy, mechanical and talc pleurodesis  DATE OF PROCEDURE  09/14/2022    COMPLICATIONS  None    INTERVAL STUDIES  CXR 10/03: decreased right apical pneumothorax    ETOH 2-4 drinks/week  TOB former smoker, 4.5 pack years, quit 01/01/2011    SUBJECTIVE  Mario is doing well. He does not have shortness of breath and is able to be active without any difficulty breathing. He does notice that if he tries to take a deep breath it hurts at first but after a few deep breaths it is better. He has an occasionally dry cough. He denies pain. His incisions are healed up nicely.    From a personal perspective, he works at [employer].    IMPRESSION (J93.12) Secondary spontaneous pneumothorax  (primary encounter diagnosis)  Comment: one month post-operative follow up  Plan: XR Chest 2 Views            37 year-old male with a recurrent right spontaneous pneumothorax status post apical bullectomy and mechanical/talc pleurodesis.    I will have him get another CXR as the one on 10/03 was a little too soon following his procedure (it should be closer to one month from his procedure).     As for any additional testing or procedures, we will base things on his symptoms. If he develops symptoms suggestive of another pneumothorax he will contact our office otherwise, he does not need to have scheduled follow up with us.    PLAN  I spent 15 min on the date of the encounter in chart review, patient visit,  review of tests, documentation and/or discussion with other providers about the issues documented above. I reviewed the plan as follows:  CXR within a week otherwise follow up with thoracic surgery as needed.  Necessary Tests & Appointments: CXR  All questions were answered and the patient and present family were in agreement with the plan.  I appreciate the opportunity to participate in the care of your patient and will keep you updated.  Sincerely,          Phone call duration: 6 minutes

## 2022-10-13 ENCOUNTER — HOSPITAL ENCOUNTER (OUTPATIENT)
Dept: GENERAL RADIOLOGY | Facility: CLINIC | Age: 37
Discharge: HOME | End: 2022-10-13
Attending: CLINICAL NURSE SPECIALIST | Admitting: CLINICAL NURSE SPECIALIST
Payer: MEDICAID

## 2022-10-13 DIAGNOSIS — J93.12 SECONDARY SPONTANEOUS PNEUMOTHORAX: ICD-10-CM

## 2022-10-13 PROCEDURE — 71046 X-RAY EXAM CHEST 2 VIEWS: CPT

## 2022-12-24 ENCOUNTER — HEALTH MAINTENANCE LETTER (OUTPATIENT)
Age: 37
End: 2022-12-24

## 2024-01-28 ENCOUNTER — HEALTH MAINTENANCE LETTER (OUTPATIENT)
Age: 39
End: 2024-01-28

## 2024-10-22 NOTE — ED NOTES
Bed: ED11  Expected date:   Expected time:   Means of arrival:   Comments:  Hold for urgent care    
Pt prepped for conscious sedation placement of heimlich valve for pneumothorax.  Informed consent obtained, Dr. Finnegan at bedside.  awtg CRNA.  
Afebrile.    HR  88   RR 14   /72    99% on RA

## 2024-11-07 ENCOUNTER — HOSPITAL ENCOUNTER (EMERGENCY)
Facility: CLINIC | Age: 39
Discharge: HOME | End: 2024-11-07
Attending: EMERGENCY MEDICINE | Admitting: EMERGENCY MEDICINE
Payer: OTHER MISCELLANEOUS

## 2024-11-07 VITALS
HEART RATE: 92 BPM | DIASTOLIC BLOOD PRESSURE: 87 MMHG | RESPIRATION RATE: 18 BRPM | TEMPERATURE: 98.3 F | SYSTOLIC BLOOD PRESSURE: 137 MMHG | OXYGEN SATURATION: 96 %

## 2024-11-07 DIAGNOSIS — S05.51XA INTRAOCULAR FOREIGN BODY OF RIGHT EYE, INITIAL ENCOUNTER: ICD-10-CM

## 2024-11-07 PROCEDURE — 65222 REMOVE FOREIGN BODY FROM EYE: CPT | Mod: RT | Performed by: EMERGENCY MEDICINE

## 2024-11-07 PROCEDURE — 250N000009 HC RX 250: Performed by: EMERGENCY MEDICINE

## 2024-11-07 PROCEDURE — 99283 EMERGENCY DEPT VISIT LOW MDM: CPT | Mod: 25 | Performed by: EMERGENCY MEDICINE

## 2024-11-07 PROCEDURE — 250N000013 HC RX MED GY IP 250 OP 250 PS 637: Performed by: EMERGENCY MEDICINE

## 2024-11-07 RX ORDER — TETRACAINE HYDROCHLORIDE 5 MG/ML
1-2 SOLUTION OPHTHALMIC ONCE
Status: COMPLETED | OUTPATIENT
Start: 2024-11-07 | End: 2024-11-07

## 2024-11-07 RX ORDER — ERYTHROMYCIN 5 MG/G
0.5 OINTMENT OPHTHALMIC
Qty: 4 G | Refills: 0 | Status: SHIPPED | OUTPATIENT
Start: 2024-11-07 | End: 2024-11-10

## 2024-11-07 RX ORDER — LORAZEPAM 1 MG/1
2 TABLET ORAL ONCE
Status: COMPLETED | OUTPATIENT
Start: 2024-11-07 | End: 2024-11-07

## 2024-11-07 RX ADMIN — LORAZEPAM 2 MG: 1 TABLET ORAL at 19:30

## 2024-11-07 RX ADMIN — TETRACAINE HYDROCHLORIDE 2 DROP: 5 SOLUTION OPHTHALMIC at 19:29

## 2024-11-07 ASSESSMENT — VISUAL ACUITY
OU: 20/15
OU: NORMAL

## 2024-11-07 ASSESSMENT — ACTIVITIES OF DAILY LIVING (ADL)
ADLS_ACUITY_SCORE: 0
ADLS_ACUITY_SCORE: 0

## 2024-11-07 ASSESSMENT — COLUMBIA-SUICIDE SEVERITY RATING SCALE - C-SSRS
2. HAVE YOU ACTUALLY HAD ANY THOUGHTS OF KILLING YOURSELF IN THE PAST MONTH?: NO
6. HAVE YOU EVER DONE ANYTHING, STARTED TO DO ANYTHING, OR PREPARED TO DO ANYTHING TO END YOUR LIFE?: NO
1. IN THE PAST MONTH, HAVE YOU WISHED YOU WERE DEAD OR WISHED YOU COULD GO TO SLEEP AND NOT WAKE UP?: NO

## 2024-11-08 NOTE — ED PROVIDER NOTES
[facility]  Emergency Department Visit Note    PATIENT:  Mario Posada     39 year old     male      [id number]    Chief complaint:  Chief Complaint   Patient presents with    Eye Problem     Foreign body in eye, right        History of present illness:  Patient is a 39 year old male who works as a  presents to the emergency department today with a chief complaint of a piece of metal in his right eye.  Reports that he was at work when a piece of metal came up.  Landed in his eye.  No immediate pain however on his drive home he felt pain in his eye and watering.  Patient notes that he was wearing eye protection however the piece of metal seem to sneak up underneath his eye protection.  He has had foreign bodies in his eyes before.  He describes irritation of his eye and some burning in his eye but otherwise no pain.  His vision feels normal.  He believes that the piece of metal flew into his eye at about 11 AM this morning however it was on his drive home tonight that the pain started.    Review of Systems:  As in HPI above    /87   Pulse 92   Temp 98.3  F (36.8  C) (Tympanic)   Resp 18   SpO2 96%     Physical Exam  Constitutional: laying in hospital bed and alert, oriented  HEENT: normocephalic, atraumatic  Neck: able to fully range  Cardiovascular: regular rate and rhythm  Pulmonary: breathing comfortably on room air  Abdominal: nondistended  Genitourinary: deferred  Extremities/MSK: no peripheral edema  Skin: warm, dry and non-diaphoretic  Neurologic: moves all four extremities spontaneously, GCS 15, and CNII-XII intact  Psychiatric: calm, appropriate    EYE:   Right eye: diffuse conjunctival injection, full EOM intact and no pain with EOM  Foreign body at 4 o clock on border of iris, scleral injection, pupil round and reactive, eye is soft to the touch  Lid everted, no foreign body appreciated    Left eye: no conjunctival injection, normal pupil, round and reactive, full  EOM intact and no pain with EOM, eye is soft to the touch      MDM:  Patient is a 39 year old male with above history presenting for evaluation of foreign body in his eye.    Vitals reassuring and within normal limits. Exam notable for ocular foreign body.      Remainder of ED course below.    [facility]    Foreign Body Removal - Ocular    Date/Time: 11/7/2024 8:15 PM    Performed by: Magnolia Cerrato DO  Authorized by: Magnolia Cerrato DO    Risks, benefits and alternatives discussed.      LOCATION     Location:  R corneal    Depth:  Superficial    PRE PROCEDURE DETAILS:     Imaging:  None    ANESTHESIA (see MAR for exact dosages):     Local anesthetic:  Tetracaine drops    PROCEDURE DETAILS     Localization method:  Slit lamp    Removal mechanism:  27 G needle    POST PROCEDURE DETAILS     Confirmation:  No additional foreign bodies on visualization    Dressing:  Antibiotic ointment      No signs of pain foreign body, globe rupture, elevated pressure of the eye.  Visual acuity is normal and intact near and far.  Patient does have signs of keratitis secondary to irritation from the foreign body.  Plan to discharge with erythromycin ointment for the pain and to prevent infection and follow-up with an ophthalmologist.  Patient does have evidence of a rust ring that is residual and discussed that he might need this removed from the ophthalmologist.  Patient is feeling better.  All questions answered.  No additional foreign bodies appreciated.  Discharged in improved condition    Encounter Diagnoses:  Final diagnoses:   Intraocular foreign body of right eye, initial encounter       Final disposition: discharge    Magnolia Cerrato DO  EM Physician  [facility] ED       Magnolia Cerrato DO  11/07/24 2035

## 2024-11-08 NOTE — ED TRIAGE NOTES
Writer and provider spoke with pt about treatment and diagnostic options in ED vs. UC. Pt agreed to be evaluated in the ED.

## 2024-11-08 NOTE — ED TRIAGE NOTES
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 Grinding on regular steel. A piece flipped up under his glasses and can feel it in the right eye.

## 2024-11-08 NOTE — ED PROVIDER NOTES
There were no vitals taken for this visit.    Mario Posada is a 39-year-old male presenting with complaints of metal in his right thigh from work.  Metal is visible on exam.  Metal is embedded. Given patient's history, I recommended he/she be evaluated in the emergency department.  We discussed that he/she will likely require further testing and/or treatment beyond the capabilities of the current urgent care setting including labs and potential imaging.  Patient expresses understanding of this and agreement with the plan.  I have explained what could happen if they choose to not have the treatment/transfer.        Myra Pryor PA-C  11/07/24 [id number]

## 2024-11-08 NOTE — DISCHARGE INSTRUCTIONS
You were seen in the emergency department today with a piece of metal in your eye.  The metal was removed however you have a residual ring around your eye that might need removal by an ophthalmologist. Please follow up with them in about a week.     Use the prescribed antibiotic ointment 5-6 times a day to keep your eye moist and comfortable while it is healing.  Keep an eye out for signs of infection.

## 2024-11-11 ENCOUNTER — TELEPHONE (OUTPATIENT)
Dept: CALL CENTER | Age: 39
End: 2024-11-11
Payer: COMMERCIAL

## 2024-11-11 NOTE — TELEPHONE ENCOUNTER
[facility] Call Center    Phone Message    May a detailed message be left on voicemail: yes     Reason for Call: Appointment Intake    Referring Provider Name: Magnolia Cerrato,    Diagnosis and/or Symptoms: ED follow up for Intraocular foreign body of right eye, initial encounter [S05.51XA], ocular foreign body follow up, has rust ring    Per protocol writer to send TE for ED follow ups        Action Taken: Message routed to:  [facility] eye    Travel Screening: Not Applicable     Date of Service:

## 2024-11-12 NOTE — TELEPHONE ENCOUNTER
Spoke to pt regarding metal in eye concern/rust ring concern per ED visit 11-7-2024    Offered pt appt tomorrow and pt unable    Scheduled Thursday in acute eye clinic and pt aware of date/time/location at [facility]/duration/hospital based clinic/parking/non-[employer] insurance.    Solomon Lewis RN 5:03 PM 11/12/24

## 2024-11-12 NOTE — TELEPHONE ENCOUNTER
Mario Posada [phone] home/mobile    Left message with direct number at [phone]    Solomon Lewis RN 9:16 AM 11/12/24

## 2024-11-14 ENCOUNTER — OFFICE VISIT (OUTPATIENT)
Dept: OPHTHALMOLOGY | Facility: CLINIC | Age: 39
End: 2024-11-14
Payer: OTHER MISCELLANEOUS

## 2024-11-14 DIAGNOSIS — T15.01XA RUST RING OF RIGHT CORNEA DUE TO METALLIC FOREIGN BODY: Primary | ICD-10-CM

## 2024-11-14 DIAGNOSIS — S05.01XA ABRASION OF RIGHT CORNEA, INITIAL ENCOUNTER: ICD-10-CM

## 2024-11-14 PROCEDURE — 65210 REMOVE FOREIGN BODY FROM EYE: CPT

## 2024-11-14 PROCEDURE — 99214 OFFICE O/P EST MOD 30 MIN: CPT

## 2024-11-14 RX ORDER — POLYETHYLENE GLYCOL 400 AND PROPYLENE GLYCOL 4; 3 MG/ML; MG/ML
1 GEL OPHTHALMIC
COMMUNITY

## 2024-11-14 RX ORDER — OFLOXACIN 3 MG/ML
1-2 SOLUTION/ DROPS OPHTHALMIC 4 TIMES DAILY
Qty: 10 ML | Refills: 2 | Status: SHIPPED | OUTPATIENT
Start: 2024-11-14 | End: 2024-11-24

## 2024-11-14 ASSESSMENT — CONF VISUAL FIELD
OD_SUPERIOR_TEMPORAL_RESTRICTION: 0
OS_INFERIOR_TEMPORAL_RESTRICTION: 0
OS_SUPERIOR_NASAL_RESTRICTION: 0
OD_NORMAL: 1
OS_SUPERIOR_TEMPORAL_RESTRICTION: 0
OD_INFERIOR_NASAL_RESTRICTION: 0
OD_INFERIOR_TEMPORAL_RESTRICTION: 0
OS_INFERIOR_NASAL_RESTRICTION: 0
OS_NORMAL: 1
OD_SUPERIOR_NASAL_RESTRICTION: 0

## 2024-11-14 ASSESSMENT — TONOMETRY
IOP_METHOD: ICARE
OD_IOP_MMHG: 14
OS_IOP_MMHG: 16

## 2024-11-14 ASSESSMENT — SLIT LAMP EXAM - LIDS
COMMENTS: NORMAL
COMMENTS: NORMAL

## 2024-11-14 ASSESSMENT — VISUAL ACUITY
OD_SC: 20/20
OS_SC: 20/20
METHOD: SNELLEN - LINEAR

## 2024-11-14 ASSESSMENT — CUP TO DISC RATIO: OD_RATIO: 0.3

## 2024-11-14 NOTE — LETTER
2024    Mario Posada   1985        To Whom it May Concern;    Please excuse Mario Posada from work for a healthcare visit on 2024.    Sincerely,        Vidal Munoz MD

## 2024-11-14 NOTE — PROGRESS NOTES
Chief Complaint(s) and History of Present Illness(es)       Foreign Body Right Eye              Laterality: right eye    Duration: 1 week              Comments    Pt. States that he was at work 1 week ago and got metal in RE. Was removed in the ER and flushed. Still feels like there is something in RE. Has been more light sensitive. No change in VA BE.   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 Unsure what type of metal. No flashes or floaters.    Review of systems for the eyes was negative other than the pertinent positives/negatives listed in the HPI.    Ocular Meds: erythromycin ointment 5x daily right eye since injury 11/7    Ocular Hx: none    FOHx: no family history of macular degeneration, glaucoma, or blindness    PMHx:   Past Medical History:   Diagnosis Date    Alcohol-induced pancreatitis     Anxiety     Cannabis abuse     Chronic pain     chronic pain from chronic cough right neck/upper back    Primary spontaneous pneumothorax     7      No Diabetes, Autoimmune disease, Cancer, HTN, EDGARDO    Outpatient medications:  Current Outpatient Medications   Medication Instructions    acetaminophen (TYLENOL) 650 mg, Oral, EVERY 6 HOURS PRN    acetaminophen (TYLENOL) 975 mg, Oral, EVERY 6 HOURS    EPINEPHrine (PRIMATENE MIST IN) 1 puff, Inhalation, ONCE PRN    hydrOXYzine HCl (ATARAX) 25 mg, Oral, EVERY 6 HOURS PRN    methocarbamol (ROBAXIN) 750 mg, Oral, EVERY 6 HOURS PRN    oxyCODONE (ROXICODONE) 5-10 mg, Oral, EVERY 4 HOURS PRN    oxyCODONE-acetaminophen (PERCOCET) 5-325 MG tablet 1 tablet, Oral, EVERY 6 HOURS PRN    polyethyl glycol-propyl glycol (SYSTANE) 0.4-0.3 % 1 drop, 5 TIMES DAILY    potassium gluconate 2.5 MEQ tablet Oral    senna-docusate (SENOKOT-S/PERICOLACE) 8.6-50 MG tablet 1 tablet, Oral, 2 TIMES DAILY    vitamin B-Complex 1 tablet, Oral, DAILY    vitamin  C (ASCORBIC ACID) 250 mg, Oral, DAILY    Vitamin D3 (CHOLECALCIFEROL) 25 mcg (1000 units) tablet Oral, DAILY         Assessment & Plan      Mario Posada is a 39 year old male with the following diagnoses:    1. Rust ring of right cornea due to metallic foreign body    2. Abrasion of right cornea, initial encounter       Metallic foreign body of right cornea removed by ED on 11/7/24. Referred for evaluation of rust ring. Slit lamp examination, gonioscopy, and fundoscopy showed no evidence of intraocular foreign body, infection, or inflammation. The nasal right cornea had stromal opacity, rust ring, and possible superficial foreign body. The possible superficial foreign body incidentally dislodged during the exam and flushed from the eye using sterile saline. The rust ring was debrided from the cornea using the Kimura spatula without complication. A small epithelial defect was present after removal of the rust ring measuring approximately 0.5 x 0.5 mm.     Recommendations:   Ofloxacin 10 days  AT's/ointment for comfort  Follow-up one week for surface check  RD/ulcer/endophthalmitis return precautions         Ophthalmology Imaging and Procedure Results:       Vidal Munoz MD  Resident Physician, PGY-2  Department of Ophthalmology  11/14/2024 2:06 PM     Attending Physician Attestation:  Complete documentation of historical and exam elements from today's encounter can be found in the full encounter summary report (not reduplicated in this progress note).  I personally obtained the chief complaint(s) and history of present illness.  I confirmed and edited as necessary the review of systems, past medical/surgical history, family history, social history, and examination findings as documented by others; and I examined the patient myself.  I personally reviewed the relevant tests, images, and reports as documented above.  I formulated and edited as necessary the assessment and plan and discussed the findings and  management plan with the patient and family. Attending Physician Procedure Attestation: I was present for the entire procedure   . - Iggy Sharma MD

## 2024-11-14 NOTE — NURSING NOTE
Chief Complaints and History of Present Illnesses   Patient presents with    Foreign Body Right Eye     Chief Complaint(s) and History of Present Illness(es)       Foreign Body Right Eye              Laterality: right eye    Duration: 1 week              Comments    Pt. States that he was at work 1 week ago and got metal in RE. Was removed in the ER and flushed. Still feels like there is something in RE. Has been more light sensitive. No change in VA BE.   Gloria Rodriguez COT 12:24 PM November 14, 2024

## 2024-11-22 ENCOUNTER — OFFICE VISIT (OUTPATIENT)
Dept: OPHTHALMOLOGY | Facility: CLINIC | Age: 39
End: 2024-11-22
Attending: OPHTHALMOLOGY
Payer: COMMERCIAL

## 2024-11-22 DIAGNOSIS — S05.01XS: Primary | ICD-10-CM

## 2024-11-22 PROCEDURE — 99214 OFFICE O/P EST MOD 30 MIN: CPT

## 2024-11-22 ASSESSMENT — TONOMETRY
OD_IOP_MMHG: 20
OS_IOP_MMHG: 21
IOP_METHOD: TONOPEN

## 2024-11-22 ASSESSMENT — VISUAL ACUITY
OS_SC: 20/20
OD_SC: 20/20
METHOD: SNELLEN - LINEAR

## 2024-11-22 ASSESSMENT — SLIT LAMP EXAM - LIDS
COMMENTS: NORMAL
COMMENTS: NORMAL

## 2024-11-22 NOTE — PATIENT INSTRUCTIONS
Please perform the following:     Eye drops: (please wait approximately 5 minutes between drops if they contain medication)  -Preservative-free artificial tears 1 drop to both eye(s) at least 4 times a day     -examples include: Refresh Plus, Systane preservative-free, Thera Tears, as well as those made by your favorite stores (just please make sure they say preservative-free on them and are in separate vials/dropperettes)    -please be aware that many of these can be re-capped, so as to reduce waste of these please check your specific tears type      Ointment: (be careful of blurry vision and reduce your chances of falling after placing these, 1/4 inch is approximately the size of a grain of rice)  -Artificial tear ointment 1/4 inch to Both eye(s) at bedtime     -examples include: Refresh PM, Lacrilube, etc.    -you may also use thicker drops such as gel drops that last longer but with caution as they blur your vision and should never be placed immediately before activities such as driving; examples include: Systane gel drops, Genteal gel drops, Refresh Liquigel or Celluvisc    Other:   -Take frequent breaks when you read, watch television, or use a computer. Close your eyes. Do not rub your eyes. Artificial tears may help you when you do these activities.  -Use wrap-around sunglasses to protect your eyes from the sun, wind, and grit  -Use a humidifier to increase the moisture in the air  -Avoid smoke, wind, hair dryers, air conditioners, and aerosol sprays  -Drink adequate amounts of water to avoid dehydration  -Do warm compresses 1-2 times daily. This will help loosen up the oils in your eyelids so they can make it to the surface of your eyeball. Process: Heat up a warm compress (uncooked rice in a new, clean sock) to a safe temperature in the microwave, place it over the closed eye for 5 minutes, remove it from the eye, and then massage your eyelids gently (put your finger on your bottom eyelid and roll up in  "the direction of your eyeball, then put your finger on your top eyelid and roll down in the direction of your eyeball).   -Gently wash your eyelashes with baby shampoo daily. This will help remove debris from the exit points of the eyelid oil glands.  If your eyes does not close at night, please use an eye shield, mask or goggles at night, \"\"BLIKIJOY\" is a good brand for eye patch    "

## 2024-11-22 NOTE — PROGRESS NOTES
Chief Complaint(s) and History of Present Illness(es)       Corneal Abrasion Follow Up              Laterality: right eye    Associated symptoms: Negative for eye pain, vision loss, redness, tearing and discharge    Comments: Visit for right eye abrasion                Comments    Patient reports no change to vision since last visit and no discomfort in the eyes.    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 Had rust ring debridement on 11/14. Doing very well. No irritation. No vision issues. Using ofloxacin as prescribed    Review of systems for the eyes was negative other than the pertinent positives/negatives listed in the HPI.    Ocular Meds: ofloxacin tid x 10 days from 11/14/24    Ocular Hx: none    FOHx: no family history of macular degeneration, glaucoma, or blindness    PMHx:   Past Medical History:   Diagnosis Date    Alcohol-induced pancreatitis     Anxiety     Cannabis abuse     Chronic pain     chronic pain from chronic cough right neck/upper back    Primary spontaneous pneumothorax     7      No Diabetes, Autoimmune disease, Cancer, HTN, EDGARDO    Outpatient medications:  Current Outpatient Medications   Medication Instructions    acetaminophen (TYLENOL) 650 mg, Oral, EVERY 6 HOURS PRN    acetaminophen (TYLENOL) 975 mg, Oral, EVERY 6 HOURS    EPINEPHrine (PRIMATENE MIST IN) 1 puff, Inhalation, ONCE PRN    hydrOXYzine HCl (ATARAX) 25 mg, Oral, EVERY 6 HOURS PRN    methocarbamol (ROBAXIN) 750 mg, Oral, EVERY 6 HOURS PRN    oxyCODONE (ROXICODONE) 5-10 mg, Oral, EVERY 4 HOURS PRN    oxyCODONE-acetaminophen (PERCOCET) 5-325 MG tablet 1 tablet, Oral, EVERY 6 HOURS PRN    polyethyl glycol-propyl glycol (SYSTANE) 0.4-0.3 % 1 drop, 5 TIMES DAILY    potassium gluconate 2.5 MEQ tablet Oral     senna-docusate (SENOKOT-S/PERICOLACE) 8.6-50 MG tablet 1 tablet, Oral, 2 TIMES DAILY    vitamin B-Complex 1 tablet, Oral, DAILY    vitamin C (ASCORBIC ACID) 250 mg, Oral, DAILY    Vitamin D3 (CHOLECALCIFEROL) 25 mcg (1000 units) tablet Oral, DAILY         Assessment & Plan      Mario Posada is a 39 year old male with the following diagnoses:    1. Abrasion of cornea, right, sequela         Metallic foreign body of right cornea removed by ED on 11/7/24. Referred for evaluation of rust ring.     At the initial visit on 11/14/24, slit lamp examination, gonioscopy, and fundoscopy showed no evidence of intraocular foreign body, infection, or inflammation. The nasal right cornea had stromal opacity, rust ring, and possible superficial foreign body. The possible superficial foreign body incidentally dislodged during the exam and flushed from the eye using sterile saline. The rust ring was debrided from the cornea using the Kimura spatula without complication. A small epithelial defect was present after removal of the rust ring measuring approximately 0.5 x 0.5 mm.     At follow-up on 11/22/24, he had complete resolution of the epithelial defect. There was a small circular area, slightly less than 1 mm, at the 3:00 limbus, with ~33% thinning from the debridement. He had no symptoms at all, his eye felt back to normal.     Recommendations:   Finish 10 day course of ofloxacin QID  Recommend artificial tears/ointment frequently to encourage resolution of the thinned area  Follow-up 4 weeks for optometry, comprehensive eye exam  RD/ulcer/endophthalmitis return precautions         Ophthalmology Imaging and Procedure Results:       Vidal Munoz MD  Resident Physician, PGY-2  Department of Ophthalmology  11/22/2024 11:54 AM

## 2024-11-22 NOTE — NURSING NOTE
Chief Complaints and History of Present Illnesses   Patient presents with    Corneal Abrasion Follow Up     Visit for right eye abrasion       Chief Complaint(s) and History of Present Illness(es)       Corneal Abrasion Follow Up              Laterality: right eye    Associated symptoms: Negative for eye pain, vision loss, redness, tearing and discharge    Comments: Visit for right eye abrasion                Comments    Patient reports no change to vision since last visit and no discomfort in the eyes.    Ocular meds:   Ofloxacin four times   AT's once daily  Ointment-none     Nirmala MOREJON 10:20 AM November 22, 2024

## 2025-02-02 ENCOUNTER — HEALTH MAINTENANCE LETTER (OUTPATIENT)
Age: 40
End: 2025-02-02

## 2025-03-19 ENCOUNTER — HOSPITAL ENCOUNTER (EMERGENCY)
Facility: CLINIC | Age: 40
Discharge: HOME | End: 2025-03-19
Payer: COMMERCIAL

## 2025-03-19 VITALS
SYSTOLIC BLOOD PRESSURE: 126 MMHG | HEART RATE: 130 BPM | OXYGEN SATURATION: 98 % | RESPIRATION RATE: 18 BRPM | TEMPERATURE: 99 F | DIASTOLIC BLOOD PRESSURE: 83 MMHG

## 2025-03-19 DIAGNOSIS — A08.4 VIRAL GASTROENTERITIS: ICD-10-CM

## 2025-03-19 LAB
FLUAV RNA SPEC QL NAA+PROBE: NEGATIVE
FLUBV RNA RESP QL NAA+PROBE: NEGATIVE
RSV RNA SPEC NAA+PROBE: NEGATIVE
SARS-COV-2 RNA RESP QL NAA+PROBE: NEGATIVE

## 2025-03-19 PROCEDURE — G0463 HOSPITAL OUTPT CLINIC VISIT: HCPCS

## 2025-03-19 PROCEDURE — 99213 OFFICE O/P EST LOW 20 MIN: CPT

## 2025-03-19 RX ORDER — ONDANSETRON 4 MG/1
4 TABLET, ORALLY DISINTEGRATING ORAL EVERY 8 HOURS PRN
Qty: 20 TABLET | Refills: 0 | Status: SHIPPED | OUTPATIENT
Start: 2025-03-19

## 2025-03-19 ASSESSMENT — COLUMBIA-SUICIDE SEVERITY RATING SCALE - C-SSRS
6. HAVE YOU EVER DONE ANYTHING, STARTED TO DO ANYTHING, OR PREPARED TO DO ANYTHING TO END YOUR LIFE?: NO
1. IN THE PAST MONTH, HAVE YOU WISHED YOU WERE DEAD OR WISHED YOU COULD GO TO SLEEP AND NOT WAKE UP?: NO
2. HAVE YOU ACTUALLY HAD ANY THOUGHTS OF KILLING YOURSELF IN THE PAST MONTH?: NO

## 2025-03-19 NOTE — Clinical Note
Mario Posada was seen and treated in our emergency department on 3/19/2025.    The above patient should not return to work until improvement of symptoms for 24 hours.  Symptoms can last up to 7 days total from onset.     Sincerely,     [facility] Emergency Dept

## 2025-03-19 NOTE — ED PROVIDER NOTES
History     Chief Complaint   Patient presents with    Nausea, Vomiting, & Diarrhea    Letter for School/Work     HPI  Mario Posada is a 39 year old male who presents urgent care with chief complaint of nausea, vomiting and diarrhea.  Patient reports acute onset of multiple episodes of vomiting developed 4 days ago followed by diarrhea.  Vomiting lasted approximately 2 days.  Patient continues to have diarrhea which is slowly improved over the last 24 hours along with nausea.  He has been attempting to drink fluids and stay hydrated.  He is requesting a letter from work today.  Denies chest pain, shortness of breath.    Allergies:  No Known Allergies    Problem List:    Patient Active Problem List    Diagnosis Date Noted    Spontaneous pneumothorax 09/14/2022     Priority: Medium    Secondary spontaneous pneumothorax 07/20/2022     Priority: Medium    Alcoholic pancreatitis 02/18/2016     Priority: Medium    Acute pancreatitis 04/17/2015     Priority: Medium    Alcohol-induced pancreatitis 04/17/2015     Priority: Medium     Do you wish to do the replacement in the background? yes        Tobacco use disorder 09/20/2005     Priority: Medium        Past Medical History:    Past Medical History:   Diagnosis Date    Alcohol-induced pancreatitis     Anxiety     Cannabis abuse     Chronic pain     Primary spontaneous pneumothorax        Past Surgical History:    Past Surgical History:   Procedure Laterality Date    THORACOSCOPIC PLEURODESIS Right 9/14/2022    Procedure: Right thoracoscopic bullectomy, mechanical and talc pleurodesis;  Surgeon: Tim Whitt MD;  Location:  OR       Family History:    Family History   Problem Relation Age of Onset    Unknown/Adopted Father     Glaucoma No family hx of     Macular Degeneration No family hx of     Retinal detachment No family hx of        Social History:  Marital Status:  Single [1]  Social History     Tobacco Use    Smoking status: Former     Current packs/day:  0.00     Average packs/day: 0.5 packs/day for 9.0 years (4.5 ttl pk-yrs)     Types: Cigarettes     Start date: 2008     Quit date: 2011     Years since quittin.2    Smokeless tobacco: Never   Vaping Use    Vaping status: Never Used   Substance Use Topics    Alcohol use: Yes     Comment: 2-4 drinks per week    Drug use: Yes     Types: Marijuana     Comment: last used deb 22        Medications:    ondansetron (ZOFRAN ODT) 4 MG ODT tab  acetaminophen (TYLENOL) 325 MG tablet  acetaminophen (TYLENOL) 325 MG tablet  EPINEPHrine (PRIMATENE MIST IN)  hydrOXYzine (ATARAX) 25 MG tablet  methocarbamol (ROBAXIN) 750 MG tablet  oxyCODONE (ROXICODONE) 5 MG tablet  oxyCODONE-acetaminophen (PERCOCET) 5-325 MG tablet  polyethyl glycol-propyl glycol (SYSTANE) 0.4-0.3 %  potassium gluconate 2.5 MEQ tablet  senna-docusate (SENOKOT-S/PERICOLACE) 8.6-50 MG tablet  vitamin B-Complex  vitamin C (ASCORBIC ACID) 250 MG tablet  Vitamin D3 (CHOLECALCIFEROL) 25 mcg (1000 units) tablet          Review of Systems   All other systems reviewed and are negative.      Physical Exam   BP: 126/83  Pulse: (!) 130  Temp: 99  F (37.2  C)  Resp: 18  SpO2: 98 %      Physical Exam  Vitals and nursing note reviewed.   Constitutional:       General: He is not in acute distress.     Appearance: Normal appearance. He is not ill-appearing or toxic-appearing.   HENT:      Head: Normocephalic.      Nose: No congestion or rhinorrhea.      Mouth/Throat:      Pharynx: Posterior oropharyngeal erythema present. No oropharyngeal exudate.   Cardiovascular:      Rate and Rhythm: Tachycardia present.      Heart sounds: Normal heart sounds. No murmur heard.  Pulmonary:      Effort: Pulmonary effort is normal. No respiratory distress.      Breath sounds: Normal breath sounds. No stridor. No wheezing or rales.   Musculoskeletal:      Cervical back: Neck supple.   Neurological:      Mental Status: He is alert.   Psychiatric:         Mood and Affect: Mood  normal.         Behavior: Behavior normal.         ED Course        Procedures        No results found for this or any previous visit (from the past 24 hours).    Medications - No data to display    Assessments & Plan (with Medical Decision Making)     I have reviewed the nursing notes.    I have reviewed the findings, diagnosis, plan and need for follow up with the patient.        Medical Decision Making   39 year old male who presents urgent care with chief complaint of nausea, vomiting and diarrhea.  Patient reports acute onset of multiple episodes of vomiting developed 4 days ago followed by diarrhea.  Vomiting lasted approximately 2 days.  Patient continues to have diarrhea which is slowly improved over the last 24 hours along with nausea.  He has been attempting to drink fluids and stay hydrated.  He is requesting a letter from work today.  Denies chest pain, shortness of breath.      Exam above.  Patient in no acute distress.  Lungs CTAB.  He is tachycardic which I suspect is due to dehydration secondary to vomiting and diarrhea.  Patient is overall well-appearing.    Influenza, RSV, COVID-19 negative today.    I suspect he has norovirus which we discussed.  He was prescribed Zofran for nausea and vomiting.  Continue to attempt increase hydration and rest.  Patient may return to work when improvement of symptoms for 24 hours.  If worsening symptoms or new concerns develop patient should follow-up in ER.      Prior to making a final disposition on this patient the results of patient's tests and other diagnostic studies were discussed with the patient. All questions were answered. Patient expressed understanding of the plan and was amenable to it.     Disclaimer: This note consists of symbols derived from keyboarding, dictation and/or voice recognition software. As a result, there may be errors in the script that have gone undetected. Please consider this when interpreting information found in this  chart.            New Prescriptions    ONDANSETRON (ZOFRAN ODT) 4 MG ODT TAB    Take 1 tablet (4 mg) by mouth every 8 hours as needed for nausea.       Final diagnoses:   Viral gastroenteritis       3/19/2025   [facility] EMERGENCY DEPT       Myra Pryor PA-C  03/19/25 [id number]

## 2025-03-19 NOTE — DISCHARGE INSTRUCTIONS
Symptoms of norovirus usually last between 2 and 7 days.  You may take Zofran up to 2 tablets every 8 hours as needed for nausea and vomiting.  Increase hydration and rest.

## (undated) DEVICE — STPL ENDO LINEAR CUT ARTICULATING 45MM ATS45

## (undated) DEVICE — ESU GROUND PAD ADULT W/CORD E7507

## (undated) DEVICE — DRSG PRIMAPORE 02X3" 7133

## (undated) DEVICE — ESU CLEANER TIP 31142717

## (undated) DEVICE — LINEN GOWN XLG 5407

## (undated) DEVICE — SU VICRYL 0 UR-6 27" J603H

## (undated) DEVICE — STPL ENDO RELOAD 45X3.5MM 6R45B

## (undated) DEVICE — PREP CHLORAPREP 26ML TINTED HI-LITE ORANGE 930815

## (undated) DEVICE — Device

## (undated) DEVICE — ESU PENCIL W/COATED BLADE E2450H

## (undated) DEVICE — DRAPE SHEET REV FOLD 3/4 9349

## (undated) DEVICE — ENDO VALVE BX EVIS MAJ-210

## (undated) DEVICE — SU SILK 0 SH 30" K834H

## (undated) DEVICE — TUBING SUCTION 10'X3/16" N510

## (undated) DEVICE — SU MONOCRYL 4-0 PS-2 27" UND Y426H

## (undated) DEVICE — LINEN TOWEL PACK X5 5464

## (undated) DEVICE — ESU ELEC BLADE 2.75" COATED/INSULATED E1455

## (undated) DEVICE — LINEN TOWEL PACK X6 WHITE 5487

## (undated) DEVICE — ANTIFOG SOLUTION W/FOAM PAD 31142527

## (undated) DEVICE — ENDO TROCAR FIRST ENTRY KII FIOS Z-THRD 12X100MM CTF73

## (undated) DEVICE — SUCTION DRY CHEST DRAIN OASIS 3600-100

## (undated) DEVICE — TUBING SMOKE EVAC PNEUMOCLEAR HIGH FLOW 0620050250

## (undated) DEVICE — SOL NACL 0.9% IRRIG 1000ML BOTTLE 2F7124

## (undated) DEVICE — SYR BULB IRRIG DOVER 60 ML LATEX FREE 67000

## (undated) DEVICE — LUBRICANT INST KIT ENDO-LUBE 220-90

## (undated) DEVICE — SPONGE DOUBLE 1.25" W/STRING 23275-690

## (undated) DEVICE — SU VICRYL 2-0 SH 27" UND J417H

## (undated) DEVICE — ENDO TROCAR FIRST ENTRY KII FIOS Z-THRD 05X100MM CTF03

## (undated) DEVICE — SOL ADH LIQUID BENZOIN SWAB 0.6ML C1544

## (undated) DEVICE — ESU ELEC BLADE 6" COATED/INSULATED E1455-6

## (undated) DEVICE — SYR 30ML LL W/O NDL 302832

## (undated) DEVICE — CATH URETHRAL RED RUBBER 16FR LATEX 277716

## (undated) DEVICE — DRSG STERI STRIP 1/2X4" R1547

## (undated) DEVICE — DRAPE IOBAN LG .375X23.5" 6648EZ

## (undated) DEVICE — SU SILK 0 TIE 6X30" A306H

## (undated) DEVICE — SUCTION MANIFOLD NEPTUNE 2 SYS 4 PORT 0702-020-000

## (undated) DEVICE — ENDO SCOPE WARMER SEAL  C3101

## (undated) DEVICE — DRAIN CHEST TUBE 28FR STR 8028

## (undated) DEVICE — ENDO VALVE SUCTION BRONCH EVIS MAJ-209

## (undated) DEVICE — GLOVE PROTEXIS W/NEU-THERA 7.5  2D73TE75

## (undated) DEVICE — SYR 30ML SLIP TIP W/O NDL 302833

## (undated) RX ORDER — DEXAMETHASONE SODIUM PHOSPHATE 10 MG/ML
INJECTION, SOLUTION INTRAMUSCULAR; INTRAVENOUS
Status: DISPENSED
Start: 2022-09-14

## (undated) RX ORDER — EPHEDRINE SULFATE 50 MG/ML
INJECTION, SOLUTION INTRAMUSCULAR; INTRAVENOUS; SUBCUTANEOUS
Status: DISPENSED
Start: 2022-09-14

## (undated) RX ORDER — FENTANYL CITRATE 50 UG/ML
INJECTION, SOLUTION INTRAMUSCULAR; INTRAVENOUS
Status: DISPENSED
Start: 2022-09-14

## (undated) RX ORDER — LIDOCAINE HYDROCHLORIDE 20 MG/ML
INJECTION, SOLUTION EPIDURAL; INFILTRATION; INTRACAUDAL; PERINEURAL
Status: DISPENSED
Start: 2022-09-14

## (undated) RX ORDER — ONDANSETRON 2 MG/ML
INJECTION INTRAMUSCULAR; INTRAVENOUS
Status: DISPENSED
Start: 2022-09-14

## (undated) RX ORDER — BUPIVACAINE HYDROCHLORIDE 2.5 MG/ML
INJECTION, SOLUTION EPIDURAL; INFILTRATION; INTRACAUDAL
Status: DISPENSED
Start: 2022-09-14

## (undated) RX ORDER — ACETAMINOPHEN 325 MG/1
TABLET ORAL
Status: DISPENSED
Start: 2022-09-14

## (undated) RX ORDER — CEFAZOLIN SODIUM/WATER 2 G/20 ML
SYRINGE (ML) INTRAVENOUS
Status: DISPENSED
Start: 2022-09-14

## (undated) RX ORDER — GLYCOPYRROLATE 0.2 MG/ML
INJECTION, SOLUTION INTRAMUSCULAR; INTRAVENOUS
Status: DISPENSED
Start: 2022-09-14

## (undated) RX ORDER — DEXMEDETOMIDINE HYDROCHLORIDE 100 UG/ML
INJECTION, SOLUTION INTRAVENOUS
Status: DISPENSED
Start: 2022-09-14

## (undated) RX ORDER — HYDROMORPHONE HCL IN WATER/PF 6 MG/30 ML
PATIENT CONTROLLED ANALGESIA SYRINGE INTRAVENOUS
Status: DISPENSED
Start: 2022-09-14

## (undated) RX ORDER — CHLORHEXIDINE GLUCONATE ORAL RINSE 1.2 MG/ML
SOLUTION DENTAL
Status: DISPENSED
Start: 2022-09-14

## (undated) RX ORDER — PROPOFOL 10 MG/ML
INJECTION, EMULSION INTRAVENOUS
Status: DISPENSED
Start: 2022-07-17

## (undated) RX ORDER — PROPOFOL 10 MG/ML
INJECTION, EMULSION INTRAVENOUS
Status: DISPENSED
Start: 2022-09-14

## (undated) RX ORDER — HEPARIN SODIUM 5000 [USP'U]/.5ML
INJECTION, SOLUTION INTRAVENOUS; SUBCUTANEOUS
Status: DISPENSED
Start: 2022-09-14

## (undated) RX ORDER — KETAMINE HCL IN 0.9 % NACL 50 MG/5 ML
SYRINGE (ML) INTRAVENOUS
Status: DISPENSED
Start: 2019-03-01

## (undated) RX ORDER — LIDOCAINE HYDROCHLORIDE 10 MG/ML
INJECTION, SOLUTION EPIDURAL; INFILTRATION; INTRACAUDAL; PERINEURAL
Status: DISPENSED
Start: 2022-07-17

## (undated) RX ORDER — OXYCODONE HYDROCHLORIDE 5 MG/1
TABLET ORAL
Status: DISPENSED
Start: 2022-09-14

## (undated) RX ORDER — PROPOFOL 10 MG/ML
INJECTION, EMULSION INTRAVENOUS
Status: DISPENSED
Start: 2019-03-01

## (undated) RX ORDER — SODIUM CHLORIDE, SODIUM LACTATE, POTASSIUM CHLORIDE, CALCIUM CHLORIDE 600; 310; 30; 20 MG/100ML; MG/100ML; MG/100ML; MG/100ML
INJECTION, SOLUTION INTRAVENOUS
Status: DISPENSED
Start: 2022-09-14

## (undated) RX ORDER — DEXAMETHASONE SODIUM PHOSPHATE 4 MG/ML
INJECTION, SOLUTION INTRA-ARTICULAR; INTRALESIONAL; INTRAMUSCULAR; INTRAVENOUS; SOFT TISSUE
Status: DISPENSED
Start: 2022-09-14

## (undated) RX ORDER — FENTANYL CITRATE-0.9 % NACL/PF 10 MCG/ML
PLASTIC BAG, INJECTION (ML) INTRAVENOUS
Status: DISPENSED
Start: 2022-09-14

## (undated) RX ORDER — EPHEDRINE SULFATE 50 MG/ML
INJECTION, SOLUTION INTRAVENOUS
Status: DISPENSED
Start: 2017-04-04

## (undated) RX ORDER — GABAPENTIN 300 MG/1
CAPSULE ORAL
Status: DISPENSED
Start: 2022-09-14

## (undated) RX ORDER — PROPOFOL 10 MG/ML
INJECTION, EMULSION INTRAVENOUS
Status: DISPENSED
Start: 2017-04-04

## (undated) RX ORDER — CELECOXIB 200 MG/1
CAPSULE ORAL
Status: DISPENSED
Start: 2022-09-14

## (undated) RX ORDER — HYDROMORPHONE HYDROCHLORIDE 1 MG/ML
INJECTION, SOLUTION INTRAMUSCULAR; INTRAVENOUS; SUBCUTANEOUS
Status: DISPENSED
Start: 2022-09-14